# Patient Record
Sex: FEMALE | Race: WHITE | NOT HISPANIC OR LATINO | Employment: OTHER | URBAN - METROPOLITAN AREA
[De-identification: names, ages, dates, MRNs, and addresses within clinical notes are randomized per-mention and may not be internally consistent; named-entity substitution may affect disease eponyms.]

---

## 2017-01-02 ENCOUNTER — OFFICE VISIT (OUTPATIENT)
Dept: FAMILY MEDICINE | Facility: CLINIC | Age: 47
End: 2017-01-02
Payer: COMMERCIAL

## 2017-01-02 VITALS
SYSTOLIC BLOOD PRESSURE: 129 MMHG | HEIGHT: 68 IN | WEIGHT: 205.2 LBS | BODY MASS INDEX: 31.1 KG/M2 | TEMPERATURE: 97.8 F | HEART RATE: 82 BPM | DIASTOLIC BLOOD PRESSURE: 85 MMHG

## 2017-01-02 DIAGNOSIS — F33.0 MAJOR DEPRESSIVE DISORDER, RECURRENT EPISODE, MILD (H): ICD-10-CM

## 2017-01-02 DIAGNOSIS — Z01.818 PREOP GENERAL PHYSICAL EXAM: Primary | ICD-10-CM

## 2017-01-02 DIAGNOSIS — K43.9 ABDOMINAL WALL HERNIA: ICD-10-CM

## 2017-01-02 PROCEDURE — 99215 OFFICE O/P EST HI 40 MIN: CPT | Performed by: FAMILY MEDICINE

## 2017-01-02 NOTE — NURSING NOTE
"Chief Complaint   Patient presents with     Pre-Op Exam     DOS 1/4/17       Initial Pulse 82  Temp(Src) 97.8  F (36.6  C) (Tympanic)  Ht 5' 7.75\" (1.721 m)  Wt 205 lb 3.2 oz (93.078 kg)  BMI 31.43 kg/m2 Estimated body mass index is 31.43 kg/(m^2) as calculated from the following:    Height as of this encounter: 5' 7.75\" (1.721 m).    Weight as of this encounter: 205 lb 3.2 oz (93.078 kg).  BP completed using cuff size: paolo Sandoval CMA    "

## 2017-01-02 NOTE — MR AVS SNAPSHOT
After Visit Summary   1/2/2017    Consuelo Medellin    MRN: [id number]           Patient Information     Date Of Birth          1970        Visit Information        Provider Department      1/2/2017 11:00 AM Jayme An MD [facility]        Today's Diagnoses     Preop general physical exam    -  1     Major depressive disorder, recurrent episode, mild (H)           Care Instructions    Follow preoperative instructions given by your surgeon.  Your recommendations will be available to your surgeon through EPIC.    Avoid Ibuprofen or Naproxen until after surgery or further instructions.  You may take Acetaminophen 325 mg orally 1-2 tabs every 4-6 hrs as needed for pain    Before Your Surgery      Call your surgeon if there is any change in your health. This includes signs of a cold or flu (such as a sore throat, runny nose, cough, rash or fever).    Do not smoke, drink alcohol or take over the counter medicine (unless your surgeon or primary care doctor tells you to) for the 24 hours before and after surgery.    If you take prescribed drugs: Follow your doctor s orders about which medicines to take and which to stop until after surgery.    Eating and drinking prior to surgery: follow the instructions from your surgeon    Take a shower or bath the night before surgery. Use the soap your surgeon gave you to gently clean your skin. If you do not have soap from your surgeon, use your regular soap. Do not shave or scrub the surgery site.  Wear clean pajamas and have clean sheets on your bed.         Follow-ups after your visit        Your next 10 appointments already scheduled     Jan 04, 2017   Procedure with Stevie Bangura MD   [facility] (--)    [address] [address] [phone]           The medical center is located at [address] (between [address] in [place], [place] north of [place]).              Who  "to contact     If you have questions or need follow up information about today's clinic visit or your schedule please contact [facility] directly at [phone].  Normal or non-critical lab and imaging results will be communicated to you by MyChart, letter or phone within 4 business days after the clinic has received the results. If you do not hear from us within 7 days, please contact the clinic through [employer] or phone. If you have a critical or abnormal lab result, we will notify you by phone as soon as possible.  Submit refill requests through [employer] or call your pharmacy and they will forward the refill request to us. Please allow 3 business days for your refill to be completed.          Additional Information About Your Visit        [employer] Information     [employer] gives you secure access to your electronic health record. If you see a primary care provider, you can also send messages to your care team and make appointments. If you have questions, please call your primary care clinic.  If you do not have a primary care provider, please call [phone] and they will assist you.        Your Vitals Were     Pulse Temperature Height BMI (Body Mass Index)          82 97.8  F (36.6  C) (Tympanic) 5' 7.75\" (1.721 m) 31.43 kg/m2         Blood Pressure from Last 3 Encounters:   01/02/17 129/85   12/15/16 129/86   11/29/16 129/83    Weight from Last 3 Encounters:   01/02/17 205 lb 3.2 oz (93.078 kg)   12/15/16 200 lb (90.719 kg)   11/29/16 206 lb (93.441 kg)              We Performed the Following     DEPRESSION ACTION PLAN (DAP)        Primary Care Provider Office Phone # Fax #    Nano Quinonez [phone]       [facility] [address] [address]        Thank you!     Thank you for choosing [facility]  for your care. Our goal is always to provide you with excellent care. Hearing back from our patients is one way we can continue to improve " our services. Please take a few minutes to complete the written survey that you may receive in the mail after your visit with us. Thank you!             Your Updated Medication List - Protect others around you: Learn how to safely use, store and throw away your medicines at www.disposemymeds.org.          This list is accurate as of: 1/2/17 11:28 AM.  Always use your most recent med list.                   Brand Name Dispense Instructions for use    buPROPion 150 MG 24 hr tablet    WELLBUTRIN XL    90 tablet    Take 1 tablet (150 mg) by mouth every morning

## 2017-01-02 NOTE — Clinical Note
My Depression Action Plan  Name: Consuelo Medellin   Date of Birth 1970  Date: 1/2/2017    My doctor: Nano Quinonez   My clinic: [facility]  [address] [address] [phone]          GREEN    ZONE   Good Control    What it looks like:     Things are going generally well. You have normal up s and down s. You may even feel depressed from time to time, but bad moods usually last less than a day.   What you need to do:  1. Continue to care for yourself (see self care plan)  2. Check your depression survival kit and update it as needed  3. Follow your physician s recommendations including any medication.  4. Do not stop taking medication unless you consult with your physician first.           YELLOW         ZONE Getting Worse    What it looks like:     Depression is starting to interfere with your life.     It may be hard to get out of bed; you may be starting to isolate yourself from others.    Symptoms of depression are starting to last most all day and this has happened for several days.     You may have suicidal thoughts but they are not constant.   What you need to do:     1. Call your care team, your response to treatment will improve if you keep your care team informed of your progress. Yellow periods are signs an adjustment may need to be made.     2. Continue your self-care, even if you have to fake it!    3. Talk to someone in your support network    4. Open up your depression survival kit           RED    ZONE Medical Alert - Get Help    What it looks like:     Depression is seriously interfering with your life.     You may experience these or other symptoms: You can t get out of bed most days, can t work or engage in other necessary activities, you have trouble taking care of basic hygiene, or basic responsibilities, thoughts of suicide or death that will not go away, self-injurious behavior.     What you need to do:  1. Call your care team and  request a same-day appointment. If they are not available (weekends or after hours) call your local crisis line, emergency room or 911.      Electronically signed by: Jayme An, January 2, 2017    Depression Self Care Plan / Survival Kit    Self-Care for Depression  Here s the deal. Your body and mind are really not as separate as most people think.  What you do and think affects how you feel and how you feel influences what you do and think. This means if you do things that people who feel good do, it will help you feel better.  Sometimes this is all it takes.  There is also a place for medication and therapy depending on how severe your depression is, so be sure to consult with your medical provider and/ or Behavioral Health Consultant if your symptoms are worsening or not improving.     In order to better manage my stress, I will:    Exercise  Get some form of exercise, every day. This will help reduce pain and release endorphins, the  feel good  chemicals in your brain. This is almost as good as taking antidepressants!  This is not the same as joining a gym and then never going! (they count on that by the way ) It can be as simple as just going for a walk or doing some gardening, anything that will get you moving.      Hygiene   Maintain good hygiene (Get out of bed in the morning, Make your bed, Brush your teeth, Take a shower, and Get dressed like you were going to work, even if you are unemployed).  If your clothes don't fit try to get ones that do.    Diet  I will strive to eat foods that are good for me, drink plenty of water, and avoid excessive sugar, caffeine, alcohol, and other mood-altering substances.  Some foods that are helpful in depression are: complex carbohydrates, B vitamins, flaxseed, fish or fish oil, fresh fruits and vegetables.    Psychotherapy  I agree to participate in Individual Therapy (if recommended).    Medication  If prescribed medications, I agree to take them.  Missing  doses can result in serious side effects.  I understand that drinking alcohol, or other illicit drug use, may cause potential side effects.  I will not stop my medication abruptly without first discussing it with my provider.    Staying Connected With Others  I will stay in touch with my friends, family members, and my primary care provider/team.    Use your imagination  Be creative.  We all have a creative side; it doesn t matter if it s oil painting, sand castles, or mud pies! This will also kick up the endorphins.    Witness Beauty  (AKA stop and smell the roses) Take a look outside, even in mid-winter. Notice colors, textures. Watch the squirrels and birds.     Service to others  Be of service to others.  There is always someone else in need.  By helping others we can  get out of ourselves  and remember the really important things.  This also provides opportunities for practicing all the other parts of the program.    Humor  Laugh and be silly!  Adjust your TV habits for less news and crime-drama and more comedy.    Control your stress  Try breathing deep, massage therapy, biofeedback, and meditation. Find time to relax each day.     My support system    Clinic Contact:  Phone number:    Contact 1:  Phone number:    Contact 2:  Phone number:    Alevism/:  Phone number:    Therapist:  Phone number:    Local crisis center:    Phone number:    Other community support:  Phone number:

## 2017-01-02 NOTE — PROGRESS NOTES
[facility]  [address] [address]  [phone]  Dept: [phone]    PRE-OP EVALUATION:  Today's date: 2017    Consuelo Medellin (: 1970) presents for pre-operative evaluation assessment as requested by Dr. Carvalho.  She requires evaluation and anesthesia risk assessment prior to undergoing surgery/procedure for treatment of hernia .  Proposed procedure: ventral hernia repair    Date of Surgery/ Procedure: 17  Time of Surgery/ Procedure: 3:00  Hospital/Surgical Facility: [facility]  Fax number for surgical facility: [facility]  Primary Physician: Nano Quinonez  Type of Anesthesia Anticipated: General    Patient has a Health Care Directive or Living Will:  Yes - on file    1. NO - Do you have a history of heart attack, stroke, stent, bypass or surgery on an artery in the head, neck, heart or legs?  2. NO - Do you ever have any pain or discomfort in your chest?  3. NO - Do you have a history of  Heart Failure?  4. NO - Are you troubled by shortness of breath when: walking on the level, up a slight hill or at night?  5. NO - Do you currently have a cold, bronchitis or other respiratory infection?  6. NO - Do you have a cough, shortness of breath or wheezing?  7. NO - Do you sometimes get pains in the calves of your legs when you walk?  8. NO - Do you or anyone in your family have previous history of blood clots?  9. NO - Do you or does anyone in your family have a serious bleeding problem such as prolonged bleeding following surgeries or cuts?  10. NO - Have you ever had problems with anemia or been told to take iron pills?  11. NO - Have you had any abnormal blood loss such as black, tarry or bloody stools, or abnormal vaginal bleeding?  12. NO - Have you ever had a blood transfusion?  13. NO - Have you or any of your relatives ever had problems with anesthesia?  14. NO - Do you have sleep apnea, excessive snoring or daytime drowsiness?  15. NO - Do you have  any prosthetic heart valves?  16. NO - Do you have prosthetic joints?  17. NO - Is there any chance that you may be pregnant?      HPI:                                                      Brief HPI related to upcoming procedure: Patient has periumbilical ventral hernia needing repair.      See problem list for active medical problems.  Problems all longstanding and stable, except as noted/documented.  See ROS for pertinent symptoms related to these conditions.                                                                                                  .  DEPRESSION - Patient has a long history of Depression of moderate severity requiring medication for control with recent symptoms being stable..Current symptoms of depression include none.                                                                                                                                                                                    .    MEDICAL HISTORY:                                                      Patient Active Problem List    Diagnosis Date Noted     Seasonal affective disorder (H) 10/24/2016     Priority: Medium     Thyroid nodule 02/29/2016     Priority: Medium     Major depressive disorder, recurrent episode, mild (H) 11/10/2015     Priority: Medium     Obesity 11/18/2011     Priority: Medium     Attention deficit disorder of adult 11/18/2011     Priority: Medium     Inguinal hernia 10/20/2011     Priority: Medium     Problem list name updated by automated process. Provider to review       Blood type O+ 06/24/2011     Priority: Medium     CARDIOVASCULAR SCREENING; LDL GOAL LESS THAN 160 10/31/2010     Priority: Medium      Past Medical History   Diagnosis Date     Other ear problems      YOUNG CHILD     Miscarriage 7/2010     Thyroid disease      thyroid nodule     Gestational diabetes      Past Surgical History   Procedure Laterality Date     Pe tubes       Herniorrhaphy inguinal  10/26/2011      "Procedure:HERNIORRHAPHY INGUINAL; Right Inguinal Herniorrhapy with Mesh Placement; Surgeon:ANITRA SHIELDS; Location:[place] OR     Gyn surgery       C section     Thyroidectomy Left 3/8/2016     Procedure: THYROIDECTOMY;  Surgeon: Anitra Ornelas MD;  Location:  OR     Current Outpatient Prescriptions   Medication Sig Dispense Refill     buPROPion (WELLBUTRIN XL) 150 MG 24 hr tablet Take 1 tablet (150 mg) by mouth every morning 90 tablet 3     OTC products: none    Allergies   Allergen Reactions     Nkda [No Known Drug Allergies]      Wasps [Hornets] Nausea and Vomiting and Hives      Latex Allergy: NO    Social History   Substance Use Topics     Smoking status: Never Smoker      Smokeless tobacco: Never Used     Alcohol Use: Yes      Comment: rare     History   Drug Use No       REVIEW OF SYSTEMS:                                                    C: NEGATIVE for fever, chills, change in weight  I: NEGATIVE for worrisome rashes, moles or lesions  E: NEGATIVE for vision changes or irritation  E/M: NEGATIVE for ear, mouth and throat problems  R: NEGATIVE for significant cough or SOB  CV: NEGATIVE for chest pain, palpitations or peripheral edema  GI: NEGATIVE for nausea, abdominal pain, heartburn, or change in bowel habits  : NEGATIVE for frequency, dysuria, or hematuria  M: NEGATIVE for significant arthralgias or myalgia  N: NEGATIVE for weakness, dizziness or paresthesias  E: NEGATIVE for temperature intolerance, skin/hair changes  H: NEGATIVE for bleeding problems  P: NEGATIVE for changes in mood or affect    EXAM:                                                    /85 mmHg  Pulse 82  Temp(Src) 97.8  F (36.6  C) (Tympanic)  Ht 5' 7.75\" (1.721 m)  Wt 205 lb 3.2 oz (93.078 kg)  BMI 31.43 kg/m2  GENERAL APPEARANCE: obese, alert and no distress; ambulatory w/o assist  EYES: pink conj, no icterus, PERRL, EOMI  HENT: ear canals and TM's normal, nose and mouth without ulcers or lesions, " oropharynx clear and oral mucous membranes moist  NECK: no adenopathy, no asymmetry, masses, or scars and thyroid normal to palpation  RESP: lungs clear to auscultation - no rales, rhonchi or wheezes  CV: regular rates and rhythm, normal S1 S2, no S3 or S4, no murmur, click or rub, no peripheral edema and peripheral pulses strong  ABDOMEN: rounded, soft, nontender, no hepatosplenomegaly, reducible non-tender small ventral wall hernia superior to umbilicus   MS: no musculoskeletal defects are noted and gait is age appropriate without ataxia  SKIN: good turgor, no rash/jaundice/ecchymosis  NEURO: Normal strength and tone, sensory exam grossly normal, mentation intact and speech normal   DIAGNOSTICS:                                                    No labs or EKG required based on patient's medical history.    Recent Labs   Lab Test  03/02/16   1145  10/21/11   1138  09/30/10   HGB  13.1  13.1   < >  12.4   PLT  338   --    --   325    < > = values in this interval not displayed.        IMPRESSION:                                                    Reason for surgery/procedure: ventral wall hernia  Diagnosis/reason for consult: preop evaluation; depression    The proposed surgical procedure is considered INTERMEDIATE risk.    REVISED CARDIAC RISK INDEX  The patient has the following serious cardiovascular risks for perioperative complications such as (MI, PE, VFib and 3  AV Block):  No serious cardiac risks  INTERPRETATION: 0 risks: Class I (very low risk - 0.4% complication rate)    The patient has the following additional risks for perioperative complications:  No identified additional risks  [place] 10-year CHD Risk Score: <1% (8 Total Points)   Values used to calculate score:     Age: 46 years -- Points: 3     Total Cholesterol: 189 mg/dL -- Points: 3     HDL Cholesterol: 48 mg/dL -- Points: 1     Systolic BP (untreated): 129 mmHg -- Points: 1     The patient is not a smoker. -- Points: 0     The patient has  not been diagnosed with diabetes. -- Points: 0     The patient does not have a family history of CHD. -- Points:0       ICD-10-CM    1. Preop general physical exam Z01.818    2. Abdominal wall hernia K43.9    3. Major depressive disorder, recurrent episode, mild (H) F33.0 DEPRESSION ACTION PLAN (DAP)       RECOMMENDATIONS:                                                      --Consult hospital rounder / IM to assist post-op medical management  Routine DVT precautions recommended.    --Patient is to take all scheduled medications on the day of surgery EXCEPT for modifications listed below.    Patient may take Bupropion up to night before surgery.    Anticoagulant or Antiplatelet Medication Use  NSAIDS: Ibuprofen (Motrin):         Stop one day prior to surgery  Naproxen (Naprosyn):   Stop 2-3 days prior to surgery        APPROVAL GIVEN to proceed with proposed procedure, without further diagnostic evaluation       Signed Electronically by: Jayme An MD    Copy of this evaluation report is provided to requesting physician.    [facility] Preop Guidelines

## 2017-01-02 NOTE — PATIENT INSTRUCTIONS
Follow preoperative instructions given by your surgeon.  Your recommendations will be available to your surgeon through [employer].    Avoid Ibuprofen or Naproxen until after surgery or further instructions.  You may take Acetaminophen 325 mg orally 1-2 tabs every 4-6 hrs as needed for pain    Before Your Surgery      Call your surgeon if there is any change in your health. This includes signs of a cold or flu (such as a sore throat, runny nose, cough, rash or fever).    Do not smoke, drink alcohol or take over the counter medicine (unless your surgeon or primary care doctor tells you to) for the 24 hours before and after surgery.    If you take prescribed drugs: Follow your doctor s orders about which medicines to take and which to stop until after surgery.    Eating and drinking prior to surgery: follow the instructions from your surgeon    Take a shower or bath the night before surgery. Use the soap your surgeon gave you to gently clean your skin. If you do not have soap from your surgeon, use your regular soap. Do not shave or scrub the surgery site.  Wear clean pajamas and have clean sheets on your bed.

## 2017-01-03 ENCOUNTER — ANESTHESIA EVENT (OUTPATIENT)
Dept: SURGERY | Facility: CLINIC | Age: 47
End: 2017-01-03
Payer: COMMERCIAL

## 2017-01-04 ENCOUNTER — ANESTHESIA (OUTPATIENT)
Dept: SURGERY | Facility: CLINIC | Age: 47
End: 2017-01-04
Payer: COMMERCIAL

## 2017-01-04 PROCEDURE — 25000125 ZZHC RX 250: Performed by: NURSE ANESTHETIST, CERTIFIED REGISTERED

## 2017-01-04 PROCEDURE — 25000125 ZZHC RX 250: Performed by: SURGERY

## 2017-01-04 PROCEDURE — 25800025 ZZH RX 258: Performed by: NURSE ANESTHETIST, CERTIFIED REGISTERED

## 2017-01-04 RX ORDER — PROPOFOL 10 MG/ML
INJECTION, EMULSION INTRAVENOUS CONTINUOUS PRN
Status: DISCONTINUED | OUTPATIENT
Start: 2017-01-04 | End: 2017-01-04

## 2017-01-04 RX ORDER — DEXAMETHASONE SODIUM PHOSPHATE 4 MG/ML
INJECTION, SOLUTION INTRA-ARTICULAR; INTRALESIONAL; INTRAMUSCULAR; INTRAVENOUS; SOFT TISSUE PRN
Status: DISCONTINUED | OUTPATIENT
Start: 2017-01-04 | End: 2017-01-04

## 2017-01-04 RX ORDER — ONDANSETRON 2 MG/ML
INJECTION INTRAMUSCULAR; INTRAVENOUS PRN
Status: DISCONTINUED | OUTPATIENT
Start: 2017-01-04 | End: 2017-01-04

## 2017-01-04 RX ORDER — FENTANYL CITRATE 50 UG/ML
INJECTION, SOLUTION INTRAMUSCULAR; INTRAVENOUS PRN
Status: DISCONTINUED | OUTPATIENT
Start: 2017-01-04 | End: 2017-01-04

## 2017-01-04 RX ADMIN — FENTANYL CITRATE 50 MCG: 50 INJECTION, SOLUTION INTRAMUSCULAR; INTRAVENOUS at 15:55

## 2017-01-04 RX ADMIN — MIDAZOLAM HYDROCHLORIDE 1 MG: 1 INJECTION, SOLUTION INTRAMUSCULAR; INTRAVENOUS at 15:54

## 2017-01-04 RX ADMIN — SODIUM CHLORIDE, POTASSIUM CHLORIDE, SODIUM LACTATE AND CALCIUM CHLORIDE: 600; 310; 30; 20 INJECTION, SOLUTION INTRAVENOUS at 15:48

## 2017-01-04 RX ADMIN — ONDANSETRON 4 MG: 2 INJECTION INTRAMUSCULAR; INTRAVENOUS at 16:06

## 2017-01-04 RX ADMIN — MIDAZOLAM HYDROCHLORIDE 2 MG: 1 INJECTION, SOLUTION INTRAMUSCULAR; INTRAVENOUS at 15:50

## 2017-01-04 RX ADMIN — PROPOFOL 200 MCG/KG/MIN: 10 INJECTION, EMULSION INTRAVENOUS at 15:48

## 2017-01-04 RX ADMIN — MIDAZOLAM HYDROCHLORIDE 2 MG: 1 INJECTION, SOLUTION INTRAMUSCULAR; INTRAVENOUS at 15:48

## 2017-01-04 RX ADMIN — LIDOCAINE HYDROCHLORIDE 50 MG: 10 INJECTION, SOLUTION INFILTRATION; PERINEURAL at 15:48

## 2017-01-04 RX ADMIN — DEXAMETHASONE SODIUM PHOSPHATE 4 MG: 4 INJECTION, SOLUTION INTRAMUSCULAR; INTRAVENOUS at 16:06

## 2017-01-04 RX ADMIN — CEFAZOLIN SODIUM 2 G: 2 INJECTION, SOLUTION INTRAVENOUS at 15:48

## 2017-01-04 RX ADMIN — FENTANYL CITRATE 100 MCG: 50 INJECTION, SOLUTION INTRAMUSCULAR; INTRAVENOUS at 15:48

## 2017-01-04 RX ADMIN — FENTANYL CITRATE 100 MCG: 50 INJECTION, SOLUTION INTRAMUSCULAR; INTRAVENOUS at 15:50

## 2017-01-04 NOTE — ANESTHESIA CARE TRANSFER NOTE
Patient: Consuelo Lacie    HERNIORRHAPHY VENTRAL (N/A Abdomen)  Additional InformationProcedure(s):  Ventral Hernia Repair - Wound Class: I-Clean    Diagnosis: ventral hernia  Diagnosis Additional Information: No value filed.    Anesthesia Type:   MAC     Note:    Patient transferred to:Phase II        Vitals: (Last set prior to Anesthesia Care Transfer)              Electronically Signed By: Blanca Daniels CRNA, APRN CRNA  January 4, 2017  4:21 PM

## 2017-01-04 NOTE — ANESTHESIA PREPROCEDURE EVALUATION
Anesthesia Evaluation     .        ROS/MED HX    ENT/Pulmonary:       Neurologic:       Cardiovascular:     (+) ----. : . . . :. . Previous cardiac testing date:results:date: results:ECG reviewed date:9-2-12 results:NSR date: results:          METS/Exercise Tolerance:     Hematologic:         Musculoskeletal:         GI/Hepatic: Comment: Ventral hernia        Renal/Genitourinary:         Endo:     (+) thyroid problem Obesity, .      Psychiatric: Comment: ADD  SAD    (+) psychiatric history depression      Infectious Disease:         Malignancy:         Other:               Physical Exam  Normal systems: cardiovascular, pulmonary and dental    Airway   Mallampati: II  TM distance: >3 FB  Neck ROM: full    Dental     Cardiovascular       Pulmonary                     Anesthesia Plan      History & Physical Review  History and physical reviewed and following examination; no interval change.    ASA Status:  3 .    NPO Status:  > 8 hours    Plan for MAC   PONV prophylaxis:  Ondansetron (or other 5HT-3) and Dexamethasone or Solumedrol       Postoperative Care  Postoperative pain management:  IV analgesics and Oral pain medications.      Consents  Anesthetic plan, risks, benefits and alternatives discussed with:  Patient..                          .

## 2017-01-05 ENCOUNTER — TELEPHONE (OUTPATIENT)
Dept: FAMILY MEDICINE | Facility: CLINIC | Age: 47
End: 2017-01-05

## 2017-01-05 ENCOUNTER — TELEPHONE (OUTPATIENT)
Dept: SURGERY | Facility: CLINIC | Age: 47
End: 2017-01-05

## 2017-01-05 DIAGNOSIS — R11.0 NAUSEA: Primary | ICD-10-CM

## 2017-01-05 RX ORDER — ONDANSETRON 8 MG/1
8 TABLET, ORALLY DISINTEGRATING ORAL EVERY 8 HOURS PRN
Qty: 20 TABLET | Refills: 1 | Status: SHIPPED | OUTPATIENT
Start: 2017-01-05 | End: 2017-03-29

## 2017-01-05 NOTE — TELEPHONE ENCOUNTER
Patient called with the fact that Dr. Carvalho was sent the first message about nausea.  Dr. Carvalho is in surgery.  If they do not hear something in an hour to call surgery specialty to ask.  Nano Devi is not here today and I would have to send it to the pool for a new Rx for zofran and that would require a wait also.  Patient has good understanding. Jessy CARVALHO RN

## 2017-01-05 NOTE — TELEPHONE ENCOUNTER
Reason for call:  Patient reporting a symptom    Symptom or request: nauseas     Duration (how long have symptoms been present): x 1 day    Have you been treated for this before? No    Additional comments: pt stating she is very nauseas since surgery yesterday, cant keep anything down. Would like a medication to help w/ Nausea     Phone Number patient can be reached at:  Home number on file [phone] (home)    Best Time:  Any     Can we leave a detailed message on this number:  YES    Call taken on 1/5/2017 at 11:46 AM by Tri Castanon

## 2017-01-05 NOTE — TELEPHONE ENCOUNTER
Reason for Call:  Other prescription    Detailed comments: pt is calling because she had post op and nauseated and the surgery clinic has not gotten back to her yet   And she wants PCP to fill her medication request in to them or have PCP call them so they can address this faster   Pt is wanting something for nausea times one day see earlier phone not      Pt was notified that PCP is out of office     Phone Number Patient can be reached at: Home number on file [phone] (home)    Best Time: any     Can we leave a detailed message on this number? YES    Call taken on 1/5/2017 at 1:32 PM by Silvina Lopez

## 2017-01-26 ENCOUNTER — OFFICE VISIT (OUTPATIENT)
Dept: SURGERY | Facility: CLINIC | Age: 47
End: 2017-01-26
Payer: COMMERCIAL

## 2017-01-26 VITALS
HEART RATE: 71 BPM | TEMPERATURE: 97.5 F | SYSTOLIC BLOOD PRESSURE: 136 MMHG | DIASTOLIC BLOOD PRESSURE: 94 MMHG | BODY MASS INDEX: 32.02 KG/M2 | HEIGHT: 67 IN | WEIGHT: 204 LBS

## 2017-01-26 DIAGNOSIS — Z09 SURGERY FOLLOW-UP EXAMINATION: Primary | ICD-10-CM

## 2017-01-26 PROCEDURE — 99024 POSTOP FOLLOW-UP VISIT: CPT | Performed by: SURGERY

## 2017-01-26 NOTE — PROGRESS NOTES
Pt returns to see me for a post op visit.  She had a ventral hernia repaired.  She is doing well.    On examination;  The incision is healing nicely.  There is no signs of infection or recurrence.    A/P:  Well post op visit.  She is to return on a PRN basis.    Adam Bangura MD, FACS

## 2017-01-26 NOTE — NURSING NOTE
"Initial /94 mmHg  Pulse 71  Temp(Src) 97.5  F (36.4  C) (Oral)  Ht 1.702 m (5' 7\")  Wt 92.534 kg (204 lb)  BMI 31.94 kg/m2  LMP 12/26/2016 Estimated body mass index is 31.94 kg/(m^2) as calculated from the following:    Height as of this encounter: 1.702 m (5' 7\").    Weight as of this encounter: 92.534 kg (204 lb). .    Carmela Grigsby MA    "

## 2017-02-26 ENCOUNTER — APPOINTMENT (OUTPATIENT)
Dept: GENERAL RADIOLOGY | Facility: CLINIC | Age: 47
End: 2017-02-26
Attending: EMERGENCY MEDICINE
Payer: COMMERCIAL

## 2017-02-26 ENCOUNTER — HOSPITAL ENCOUNTER (EMERGENCY)
Facility: CLINIC | Age: 47
Discharge: HOME | End: 2017-02-26
Attending: EMERGENCY MEDICINE | Admitting: EMERGENCY MEDICINE
Payer: COMMERCIAL

## 2017-02-26 VITALS
SYSTOLIC BLOOD PRESSURE: 126 MMHG | RESPIRATION RATE: 18 BRPM | DIASTOLIC BLOOD PRESSURE: 89 MMHG | OXYGEN SATURATION: 95 % | HEART RATE: 81 BPM | BODY MASS INDEX: 31.32 KG/M2 | WEIGHT: 200 LBS | TEMPERATURE: 98.4 F

## 2017-02-26 DIAGNOSIS — R07.89 ATYPICAL CHEST PAIN: ICD-10-CM

## 2017-02-26 LAB
ALBUMIN SERPL-MCNC: 3.7 G/DL (ref 3.4–5)
ALBUMIN UR-MCNC: NEGATIVE MG/DL
ALP SERPL-CCNC: 81 U/L (ref 40–150)
ALT SERPL W P-5'-P-CCNC: 21 U/L (ref 0–50)
ANION GAP SERPL CALCULATED.3IONS-SCNC: 7 MMOL/L (ref 3–14)
APPEARANCE UR: CLEAR
AST SERPL W P-5'-P-CCNC: 11 U/L (ref 0–45)
BASOPHILS # BLD AUTO: 0 10E9/L (ref 0–0.2)
BASOPHILS NFR BLD AUTO: 0.2 %
BILIRUB SERPL-MCNC: 0.2 MG/DL (ref 0.2–1.3)
BILIRUB UR QL STRIP: NEGATIVE
BUN SERPL-MCNC: 13 MG/DL (ref 7–30)
CALCIUM SERPL-MCNC: 8.1 MG/DL (ref 8.5–10.1)
CHLORIDE SERPL-SCNC: 105 MMOL/L (ref 94–109)
CO2 SERPL-SCNC: 30 MMOL/L (ref 20–32)
COLOR UR AUTO: ABNORMAL
CREAT SERPL-MCNC: 0.83 MG/DL (ref 0.52–1.04)
DIFFERENTIAL METHOD BLD: NORMAL
EOSINOPHIL # BLD AUTO: 0.1 10E9/L (ref 0–0.7)
EOSINOPHIL NFR BLD AUTO: 0.9 %
ERYTHROCYTE [DISTWIDTH] IN BLOOD BY AUTOMATED COUNT: 12.7 % (ref 10–15)
GFR SERPL CREATININE-BSD FRML MDRD: 74 ML/MIN/1.7M2
GLUCOSE SERPL-MCNC: 105 MG/DL (ref 70–99)
GLUCOSE UR STRIP-MCNC: NEGATIVE MG/DL
HCG SERPL QL: NEGATIVE
HCT VFR BLD AUTO: 38.4 % (ref 35–47)
HGB BLD-MCNC: 12.2 G/DL (ref 11.7–15.7)
HGB UR QL STRIP: ABNORMAL
IMM GRANULOCYTES # BLD: 0 10E9/L (ref 0–0.4)
IMM GRANULOCYTES NFR BLD: 0.2 %
KETONES UR STRIP-MCNC: NEGATIVE MG/DL
LEUKOCYTE ESTERASE UR QL STRIP: NEGATIVE
LIPASE SERPL-CCNC: 131 U/L (ref 73–393)
LYMPHOCYTES # BLD AUTO: 2.2 10E9/L (ref 0.8–5.3)
LYMPHOCYTES NFR BLD AUTO: 21 %
MCH RBC QN AUTO: 29 PG (ref 26.5–33)
MCHC RBC AUTO-ENTMCNC: 31.8 G/DL (ref 31.5–36.5)
MCV RBC AUTO: 91 FL (ref 78–100)
MONOCYTES # BLD AUTO: 0.6 10E9/L (ref 0–1.3)
MONOCYTES NFR BLD AUTO: 6.1 %
NEUTROPHILS # BLD AUTO: 7.5 10E9/L (ref 1.6–8.3)
NEUTROPHILS NFR BLD AUTO: 71.6 %
NITRATE UR QL: NEGATIVE
PH UR STRIP: 6.5 PH (ref 5–7)
PLATELET # BLD AUTO: 304 10E9/L (ref 150–450)
POTASSIUM SERPL-SCNC: 4.2 MMOL/L (ref 3.4–5.3)
PROT SERPL-MCNC: 7.5 G/DL (ref 6.8–8.8)
RBC # BLD AUTO: 4.2 10E12/L (ref 3.8–5.2)
RBC #/AREA URNS AUTO: 6 /HPF (ref 0–2)
SODIUM SERPL-SCNC: 142 MMOL/L (ref 133–144)
SP GR UR STRIP: 1 (ref 1–1.03)
TROPONIN I SERPL-MCNC: NORMAL UG/L (ref 0–0.04)
TROPONIN I SERPL-MCNC: NORMAL UG/L (ref 0–0.04)
URN SPEC COLLECT METH UR: ABNORMAL
UROBILINOGEN UR STRIP-MCNC: NORMAL MG/DL (ref 0–2)
WBC # BLD AUTO: 10.4 10E9/L (ref 4–11)
WBC #/AREA URNS AUTO: <1 /HPF (ref 0–2)

## 2017-02-26 PROCEDURE — 80053 COMPREHEN METABOLIC PANEL: CPT | Performed by: EMERGENCY MEDICINE

## 2017-02-26 PROCEDURE — 83690 ASSAY OF LIPASE: CPT | Performed by: EMERGENCY MEDICINE

## 2017-02-26 PROCEDURE — 81001 URINALYSIS AUTO W/SCOPE: CPT | Performed by: EMERGENCY MEDICINE

## 2017-02-26 PROCEDURE — 84484 ASSAY OF TROPONIN QUANT: CPT | Performed by: EMERGENCY MEDICINE

## 2017-02-26 PROCEDURE — 99285 EMERGENCY DEPT VISIT HI MDM: CPT | Mod: 25

## 2017-02-26 PROCEDURE — 25000128 H RX IP 250 OP 636: Performed by: EMERGENCY MEDICINE

## 2017-02-26 PROCEDURE — 99285 EMERGENCY DEPT VISIT HI MDM: CPT | Mod: 25 | Performed by: EMERGENCY MEDICINE

## 2017-02-26 PROCEDURE — 84703 CHORIONIC GONADOTROPIN ASSAY: CPT | Performed by: EMERGENCY MEDICINE

## 2017-02-26 PROCEDURE — 96374 THER/PROPH/DIAG INJ IV PUSH: CPT

## 2017-02-26 PROCEDURE — 71020 XR CHEST 2 VW: CPT

## 2017-02-26 PROCEDURE — 93005 ELECTROCARDIOGRAM TRACING: CPT

## 2017-02-26 PROCEDURE — 25000132 ZZH RX MED GY IP 250 OP 250 PS 637: Performed by: EMERGENCY MEDICINE

## 2017-02-26 PROCEDURE — 93010 ELECTROCARDIOGRAM REPORT: CPT | Performed by: EMERGENCY MEDICINE

## 2017-02-26 PROCEDURE — 85025 COMPLETE CBC W/AUTO DIFF WBC: CPT | Performed by: EMERGENCY MEDICINE

## 2017-02-26 RX ORDER — KETOROLAC TROMETHAMINE 30 MG/ML
15 INJECTION, SOLUTION INTRAMUSCULAR; INTRAVENOUS ONCE
Status: COMPLETED | OUTPATIENT
Start: 2017-02-26 | End: 2017-02-26

## 2017-02-26 RX ORDER — ASPIRIN 81 MG/1
324 TABLET, CHEWABLE ORAL ONCE
Status: COMPLETED | OUTPATIENT
Start: 2017-02-26 | End: 2017-02-26

## 2017-02-26 RX ADMIN — ASPIRIN 81 MG 324 MG: 81 TABLET ORAL at 21:00

## 2017-02-26 RX ADMIN — KETOROLAC TROMETHAMINE 15 MG: 30 INJECTION, SOLUTION INTRAMUSCULAR at 22:19

## 2017-02-26 ASSESSMENT — ENCOUNTER SYMPTOMS
FEVER: 0
CHEST TIGHTNESS: 1
SHORTNESS OF BREATH: 0
ABDOMINAL PAIN: 0
COUGH: 0

## 2017-02-26 NOTE — ED AVS SNAPSHOT
[facility] Emergency Department    [address] [facility] [address]    Phone:  [phone]    Fax:  [phone]                                       Consuelo Medellin   MRN: [id number]    Department:  [facility] Emergency Department   Date of Visit:  2/26/2017           Patient Information     Date Of Birth          1970        Your diagnoses for this visit were:     Atypical chest pain        You were seen by Angel Chester MD.      Follow-up Information     Follow up with Nano Quinonez NP.    Specialty:  Nurse Practitioner - Family    Why:  As needed    Contact information:    [facility]  [address] [place] [address]  [phone]          Follow up with [facility] Emergency Department.    Specialty:  EMERGENCY MEDICINE    Why:  If symptoms worsen    Contact information:    [address] [place] [id number] [phone]    Additional information:    The medical center is located at   [address] [facility] ([address]   [facility] [address] in [place], [place] miles north   of [place]).        Discharge Instructions        return if symptoms worsen or new symptoms develop.  Follow-up with primary care physician this week for recheck and possible set up a stress test.  Drink plenty of fluids.  If return of chest pain please return for further evaluation and care.  *CHEST PAIN, UNCERTAIN CAUSE    Based on your exam today, the exact cause of your chest pain is not certain. Your condition does not seem serious at this time, and your pain does not appear to be coming from your heart. However, sometimes the signs of a serious problem take more time to appear. Therefore, watch for the warning signs listed below.  HOME CARE:  1. Rest today and avoid strenuous activity.  2. Take any prescribed medicine as directed.  FOLLOW UP with your doctor in 1-3 days.   GET PROMPT MEDICAL ATTENTION if any of the following occur:    A change in the type of pain: if  it feels different, becomes more severe, lasts longer, or begins to spread into your shoulder, arm, neck, jaw or back    Shortness of breath or increased pain with breathing    Weakness, dizziness, or fainting    Cough with blood or dark colored sputum (phlegm)    Fever over 101  F (38.3  C)    Swelling, pain or redness in one leg    [id number] Kristina Vaughan, [address] [place] [address], [place] [address]. All rights reserved. This information is not intended as a substitute for professional medical care. Always follow your healthcare professional's instructions.      24 Hour Appointment Hotline       To make an appointment at any [facility], call [phone] ([phone]). If you don't have a family doctor or clinic, we will help you find one. [facility] are conveniently located to serve the needs of you and your family.             Review of your medicines      Our records show that you are taking the medicines listed below. If these are incorrect, please call your family doctor or clinic.        Dose / Directions Last dose taken    buPROPion 150 MG 24 hr tablet   Commonly known as:  WELLBUTRIN XL   Dose:  150 mg   Quantity:  90 tablet        Take 1 tablet (150 mg) by mouth every morning   Refills:  3        HYDROcodone-acetaminophen 5-325 MG per tablet   Commonly known as:  NORCO   Dose:  1-2 tablet   Quantity:  30 tablet        Take 1-2 tablets by mouth every 4 hours as needed for other (Moderate to Severe Pain)   Refills:  0        ondansetron 8 MG ODT tab   Commonly known as:  ZOFRAN ODT   Dose:  8 mg   Quantity:  20 tablet        Take 1 tablet (8 mg) by mouth every 8 hours as needed for nausea   Refills:  1                Procedures and tests performed during your visit     Procedure/Test Number of Times Performed    CBC with platelets, differential 1    Chest XR,  PA & LAT 1    Comprehensive metabolic panel 1    EKG 12-lead, tracing only 1    HCG qualitative pregnancy (blood) 1    Lipase 1     Troponin I 2    UA reflex to Microscopic 1      Orders Needing Specimen Collection     None      Pending Results     No orders found from 2/24/2017 to 2/27/2017.            Pending Culture Results     No orders found from 2/24/2017 to 2/27/2017.             Test Results from your hospital stay     2/26/2017  9:21 PM - Interface, Flexilab Results      Component Results     Component Value Ref Range & Units Status    WBC 10.4 4.0 - 11.0 10e9/L Final    RBC Count 4.20 3.8 - 5.2 10e12/L Final    Hemoglobin 12.2 11.7 - 15.7 g/dL Final    Hematocrit 38.4 35.0 - 47.0 % Final    MCV 91 78 - 100 fl Final    MCH 29.0 26.5 - 33.0 pg Final    MCHC 31.8 31.5 - 36.5 g/dL Final    RDW 12.7 10.0 - 15.0 % Final    Platelet Count 304 150 - 450 10e9/L Final    Diff Method Automated Method  Final    % Neutrophils 71.6 % Final    % Lymphocytes 21.0 % Final    % Monocytes 6.1 % Final    % Eosinophils 0.9 % Final    % Basophils 0.2 % Final    % Immature Granulocytes 0.2 % Final    Absolute Neutrophil 7.5 1.6 - 8.3 10e9/L Final    Absolute Lymphocytes 2.2 0.8 - 5.3 10e9/L Final    Absolute Monocytes 0.6 0.0 - 1.3 10e9/L Final    Absolute Eosinophils 0.1 0.0 - 0.7 10e9/L Final    Absolute Basophils 0.0 0.0 - 0.2 10e9/L Final    Abs Immature Granulocytes 0.0 0 - 0.4 10e9/L Final         2/26/2017  9:37 PM - Interface, Flexilab Results      Component Results     Component Value Ref Range & Units Status    Sodium 142 133 - 144 mmol/L Final    Potassium 4.2 3.4 - 5.3 mmol/L Final    Chloride 105 94 - 109 mmol/L Final    Carbon Dioxide 30 20 - 32 mmol/L Final    Anion Gap 7 3 - 14 mmol/L Final    Glucose 105 (H) 70 - 99 mg/dL Final    Urea Nitrogen 13 7 - 30 mg/dL Final    Creatinine 0.83 0.52 - 1.04 mg/dL Final    GFR Estimate 74 >60 mL/min/1.7m2 Final    Non  GFR Calc    GFR Estimate If Black 90 >60 mL/min/1.7m2 Final    African American GFR Calc    Calcium 8.1 (L) 8.5 - 10.1 mg/dL Final    Bilirubin Total 0.2 0.2 - 1.3 mg/dL  Final    Albumin 3.7 3.4 - 5.0 g/dL Final    Protein Total 7.5 6.8 - 8.8 g/dL Final    Alkaline Phosphatase 81 40 - 150 U/L Final    ALT 21 0 - 50 U/L Final    AST 11 0 - 45 U/L Final         2/26/2017  9:37 PM - Interface, Flexilab Results      Component Results     Component Value Ref Range & Units Status    Troponin I ES  0.000 - 0.045 ug/L Final    <0.015  The 99th percentile for upper reference range is 0.045 ug/L.  Troponin values in   the range of 0.045 - 0.120 ug/L may be associated with risks of adverse   clinical events.           2/26/2017  9:37 PM - Interface, Flexilab Results      Component Results     Component Value Ref Range & Units Status    Lipase 131 73 - 393 U/L Final         2/26/2017  9:44 PM - Interface, Radiant Ib      Narrative     XR CHEST 2 VW   2/26/2017 9:39 PM     HISTORY: Shortness of breath.    COMPARISON: None.        Impression     IMPRESSION: Normal.    ALFREDO SONG MD         2/26/2017  9:28 PM - Interface, Flexilab Results      Component Results     Component Value Ref Range & Units Status    HCG Qualitative Serum Negative NEG Final         2/26/2017  9:42 PM - Interface, Flexilab Results      Component Results     Component Value Ref Range & Units Status    Color Urine Straw  Final    Appearance Urine Clear  Final    Glucose Urine Negative NEG mg/dL Final    Bilirubin Urine Negative NEG Final    Ketones Urine Negative NEG mg/dL Final    Specific Gravity Urine 1.002 (L) 1.003 - 1.035 Final    Blood Urine Moderate (A) NEG Final    pH Urine 6.5 5.0 - 7.0 pH Final    Protein Albumin Urine Negative NEG mg/dL Final    Urobilinogen mg/dL Normal 0.0 - 2.0 mg/dL Final    Nitrite Urine Negative NEG Final    Leukocyte Esterase Urine Negative NEG Final    Source Midstream Urine  Final    RBC Urine 6 (H) 0 - 2 /HPF Final    WBC Urine <1 0 - 2 /HPF Final         2/26/2017 11:20 PM - Interface, Flexilab Results      Component Results     Component Value Ref Range & Units Status    Troponin I  ES  0.000 - 0.045 ug/L Final    <0.015  The 99th percentile for upper reference range is 0.045 ug/L.  Troponin values in   the range of 0.045 - 0.120 ug/L may be associated with risks of adverse   clinical events.                  Thank you for choosing [facility]       Thank you for choosing [facility] for your care. Our goal is always to provide you with excellent care. Hearing back from our patients is one way we can continue to improve our services. Please take a few minutes to complete the written survey that you may receive in the mail after you visit with us. Thank you!        [facility] Information     [employer] gives you secure access to your electronic health record. If you see a primary care provider, you can also send messages to your care team and make appointments. If you have questions, please call your primary care clinic.  If you do not have a primary care provider, please call [phone] and they will assist you.        Care EveryWhere ID     This is your Care EveryWhere ID. This could be used by other organizations to access your [facility] medical records  [id number]        After Visit Summary       This is your record. Keep this with you and show to your community pharmacist(s) and doctor(s) at your next visit.

## 2017-02-26 NOTE — ED AVS SNAPSHOT
[facility] Emergency Department    [address] [facility] [address]    Phone:  [phone]    Fax:  [phone]                                       Consuelo Medellin   MRN: [id number]    Department:  [facility] Emergency Department   Date of Visit:  2/26/2017           After Visit Summary Signature Page     I have received my discharge instructions, and my questions have been answered. I have discussed any challenges I see with this plan with the nurse or doctor.    ..........................................................................................................................................  Patient/Patient Representative Signature      ..........................................................................................................................................  Patient Representative Print Name and Relationship to Patient    ..................................................               ................................................  Date                                            Time    ..........................................................................................................................................  Reviewed by Signature/Title    ...................................................              ..............................................  Date                                                            Time

## 2017-02-27 ENCOUNTER — TELEPHONE (OUTPATIENT)
Dept: FAMILY MEDICINE | Facility: CLINIC | Age: 47
End: 2017-02-27

## 2017-02-27 NOTE — TELEPHONE ENCOUNTER
Reason for Call: Request for an order or referral:    Order or referral being requested: order for stress test    Date needed: as soon as possible    Has the patient been seen by the PCP for this problem? No    Additional comments: pt calling stating she was seen in the ER for cardiac issues and is ok, but Dr Chester wanted her to get a stress test. When she called in to schedule there wasn't an order in. She was wondering if you could put in an order for her? Pt would like us to my chart her when the order is in there so she can schedule her test.    Notes from ER:  return if symptoms worsen or new symptoms develop. Follow-up with primary care physician this week for recheck and possible set up a stress test. Drink plenty of fluids. If return of chest pain please return for further evaluation and care.      Phone number Patient can be reached at:  Home number on file [phone] (home)    Best Time:  any    Can we leave a detailed message on this number?  YES    Call taken on 2/27/2017 at 1:51 PM by Jayne Shepard

## 2017-02-27 NOTE — ED NOTES
"Pt states about 45 min before arriving pt. She had \"a pressure in back of rt leg that radiated up rt side of body and into chest.  It's like a marble rolling up my body.\"  No cp or soa.  No n/v/d. No recent illness.  In ER pt states the pressure/tightness in chest comes and goes.  "

## 2017-02-27 NOTE — ED PROVIDER NOTES
History     Chief Complaint   Patient presents with     Chest Pain     pain tightening from right calf through hip over shoulder and has now settled into left chest. Extremely fatigued suddenly, and cold hands. Denies cardiac hx, No ASA today.      HPI  Consuelo Medellin is a 46 year old female who has a history of depression, thyroid nodule, inguinal hernia, and blood type O+ who presents to the ED today for evaluation of chest pain. Patient states that she was standing up looking through a cook book when she got a tightness in her right calf. This area of tightness stayed as a specific region and size and traveled up the right side of her body into the her chest. Patient states that when the tightness got to her chest it would go up and down the midline of her chest and neck until it stopped in the center of her chest and would wax and wane. Patient states that she has not been able to get it to go away, and the pain is not aggravated by any palpation or position. She has had issues with her circulation in the past that has gotten a little worse recently to the point where she has to sleep with her upper body elevated. She has no family history of heart disease. Patient is not a smoker and has no history of hypertension or hyperlipidemia. Patient denies recent illness, cough, fevers, abdominal pain, shortness of breath, leg swelling, or recent fall. Currently rates her pain a 2/10.    Patient Active Problem List   Diagnosis     CARDIOVASCULAR SCREENING; LDL GOAL LESS THAN 160     Blood type O+     Inguinal hernia     Obesity     Attention deficit disorder of adult     Major depressive disorder, recurrent episode, mild (H)     Thyroid nodule     Seasonal affective disorder (H)     Current Outpatient Prescriptions   Medication Sig Dispense Refill     ondansetron (ZOFRAN ODT) 8 MG ODT tab Take 1 tablet (8 mg) by mouth every 8 hours as needed for nausea 20 tablet 1     HYDROcodone-acetaminophen (NORCO) 5-325 MG per tablet  Take 1-2 tablets by mouth every 4 hours as needed for other (Moderate to Severe Pain) 30 tablet 0     buPROPion (WELLBUTRIN XL) 150 MG 24 hr tablet Take 1 tablet (150 mg) by mouth every morning 90 tablet 3     Allergies   Allergen Reactions     Nkda [No Known Drug Allergies]      Wasps [Hornets] Nausea and Vomiting and Hives       I have reviewed the Medications, Allergies, Past Medical and Surgical History, and Social History in the Epic system.    Review of Systems   Constitutional: Negative for activity change, appetite change, chills and fever.   HENT: Negative for congestion.    Respiratory: Positive for chest tightness. Negative for cough and shortness of breath.    Cardiovascular: Negative for chest pain and leg swelling.   Gastrointestinal: Negative for abdominal pain, nausea and vomiting.   Genitourinary: Negative for decreased urine volume and dysuria.   Musculoskeletal: Negative for back pain and neck pain.   Skin: Negative for rash.   Neurological: Negative for dizziness, weakness and light-headedness.   Hematological: Does not bruise/bleed easily.       Physical Exam   BP: (!) 162/108  Pulse: 89  Temp: 98.4  F (36.9  C)  Resp: 16  Weight: 90.7 kg (200 lb)  SpO2: 99 %  Physical Exam   Constitutional: She appears well-developed and well-nourished. No distress.   Psychiatric: She has a normal mood and affect.   Nursing note and vitals reviewed.    HENT: Oral mucosa moist. No lesions.  Neck: Supple  Pulmonary/Chest: Lungs are clear to auscultation bilaterally.  Cardiovascular: Heart is regular rate and rhythm. No murmur. No anterior chest wall tenderness.   Abdomen: Soft, non-distended, non-tender.   Musculoskeletal: Moving all extremities well. No peripheral edema. No calf tenderness , erythema or swelling. Pulses and sensation are symmetrical.   Neurological: Alert. No focal neurologic deficit.   Skin: No rash.    ED Course     ED Course     Procedures             EKG Interpretation:      Interpreted by  Angel Chester  Rhythm: normal sinus   Rate: Normal  Axis: Normal   Ectopy: none  Conduction: right bundle branch block (incomplete)slight  ST Segments/ T Waves: No ST-T wave changes  Q Waves: none  Comparison to prior: slight incomplete RBBB compared to 9/2/12    Clinical Impression: no acute changes ; possible slight RBBB                Critical Care time:  none               Labs Ordered and Resulted from Time of ED Arrival Up to the Time of Departure from the ED   COMPREHENSIVE METABOLIC PANEL - Abnormal; Notable for the following:        Result Value    Glucose 105 (*)     Calcium 8.1 (*)     All other components within normal limits   URINE MACROSCOPIC WITH REFLEX TO MICRO - Abnormal; Notable for the following:     Specific Gravity Urine 1.002 (*)     Blood Urine Moderate (*)     RBC Urine 6 (*)     All other components within normal limits   CBC WITH PLATELETS DIFFERENTIAL   TROPONIN I   LIPASE   HCG QUALITATIVE   TROPONIN I     Results for orders placed or performed during the hospital encounter of 02/26/17   Chest XR,  PA & LAT    Narrative    XR CHEST 2 VW   2/26/2017 9:39 PM     HISTORY: Shortness of breath.    COMPARISON: None.      Impression    IMPRESSION: Normal.    ALFREDO SONG MD         Medications   aspirin chewable tablet 324 mg (324 mg Oral Given 2/26/17 2100)   ketorolac (TORADOL) injection 15 mg (15 mg Intravenous Given 2/26/17 2219)       8:21 PM Patient assessed.   Assessments & Plan (with Medical Decision Making)Labs, ekg, and cxr were obtained. Patient was given ASA, and toradol for pain. Patient's ekg without significant change. White count is not elevated. Hgb within normal limits. No L shift is present. Comprehensive metabolic panel is unremarkable . Small blood is noted on the UA. Troponin is within normal limits. Cxr is unremarkable. Patients pain resolved with medications. Patient was observed for several hours and had a repeat troponin which was unremarkable. Patient without  significant risk factors. At this time I do not think her pain is cardiac in nature. Findings discussed in detail with the patient. She remains pain free. She will return if symptoms worsen or new symptoms  Develop. She will follow up with primary care this week for recheck and possible set up of a stress test. She is in agreement with this plan. I have considered numerous causes of chest pain including; ACS, MI, pneumonia, pneumothorax, dissection, pleurisy, pericarditis, myocarditis, Gerd, musculoskeletal, PUD. Etc..     I have reviewed the nursing notes.    I have reviewed the findings, diagnosis, plan and need for follow up with the patient.    New Prescriptions    No medications on file       Final diagnoses:   Atypical chest pain   This document serves as a record of the services and decisions personally performed and made by Angel Chester MD. It was created on HIS/HER behalf by   Ivania Mcnair, a trained medical scribe. The creation of this document is based the provider's statements to the medical scribe.  Ivania Mcnair 8:20 PM 2/26/2017    Provider:   The information in this document, created by the medical scribe for me, accurately reflects the services I personally performed and the decisions made by me. I have reviewed and approved this document for accuracy prior to leaving the patient care area.  Angel Chester MD 8:20 PM 2/26/2017 2/26/2017   [facility] EMERGENCY DEPARTMENT     Angel Chester MD  02/28/17 4844

## 2017-02-27 NOTE — DISCHARGE INSTRUCTIONS
return if symptoms worsen or new symptoms develop.  Follow-up with primary care physician this week for recheck and possible set up a stress test.  Drink plenty of fluids.  If return of chest pain please return for further evaluation and care.  *CHEST PAIN, UNCERTAIN CAUSE    Based on your exam today, the exact cause of your chest pain is not certain. Your condition does not seem serious at this time, and your pain does not appear to be coming from your heart. However, sometimes the signs of a serious problem take more time to appear. Therefore, watch for the warning signs listed below.  HOME CARE:  1. Rest today and avoid strenuous activity.  2. Take any prescribed medicine as directed.  FOLLOW UP with your doctor in 1-3 days.   GET PROMPT MEDICAL ATTENTION if any of the following occur:    A change in the type of pain: if it feels different, becomes more severe, lasts longer, or begins to spread into your shoulder, arm, neck, jaw or back    Shortness of breath or increased pain with breathing    Weakness, dizziness, or fainting    Cough with blood or dark colored sputum (phlegm)    Fever over 101  F (38.3  C)    Swelling, pain or redness in one leg    [id number] [address], [place] [address]. All rights reserved. This information is not intended as a substitute for professional medical care. Always follow your healthcare professional's instructions.

## 2017-02-27 NOTE — ED NOTES
pain tightening from right calf through hip over shoulder and has now settled into left chest. Extremely fatigued suddenly, and cold hands. Denies cardiac hx, No ASA today.

## 2017-02-28 ASSESSMENT — ENCOUNTER SYMPTOMS
WEAKNESS: 0
LIGHT-HEADEDNESS: 0
NAUSEA: 0
CHILLS: 0
BRUISES/BLEEDS EASILY: 0
NECK PAIN: 0
ACTIVITY CHANGE: 0
VOMITING: 0
DYSURIA: 0
DIZZINESS: 0
BACK PAIN: 0
APPETITE CHANGE: 0

## 2017-03-29 ENCOUNTER — ALLIED HEALTH/NURSE VISIT (OUTPATIENT)
Dept: FAMILY MEDICINE | Facility: CLINIC | Age: 47
End: 2017-03-29
Payer: COMMERCIAL

## 2017-03-29 ENCOUNTER — OFFICE VISIT (OUTPATIENT)
Dept: FAMILY MEDICINE | Facility: CLINIC | Age: 47
End: 2017-03-29
Payer: COMMERCIAL

## 2017-03-29 VITALS
SYSTOLIC BLOOD PRESSURE: 126 MMHG | WEIGHT: 209.4 LBS | OXYGEN SATURATION: 99 % | BODY MASS INDEX: 32.87 KG/M2 | DIASTOLIC BLOOD PRESSURE: 87 MMHG | HEART RATE: 103 BPM | HEIGHT: 67 IN | TEMPERATURE: 100.3 F

## 2017-03-29 DIAGNOSIS — R68.89 FLU-LIKE SYMPTOMS: Primary | ICD-10-CM

## 2017-03-29 DIAGNOSIS — R05.9 COUGH: ICD-10-CM

## 2017-03-29 DIAGNOSIS — J10.1 INFLUENZA A: ICD-10-CM

## 2017-03-29 LAB
FLUAV+FLUBV AG SPEC QL: NEGATIVE
FLUAV+FLUBV AG SPEC QL: POSITIVE
SPECIMEN SOURCE: ABNORMAL

## 2017-03-29 PROCEDURE — 99207 ZZC NO CHARGE NURSE ONLY: CPT

## 2017-03-29 PROCEDURE — 99213 OFFICE O/P EST LOW 20 MIN: CPT | Performed by: NURSE PRACTITIONER

## 2017-03-29 PROCEDURE — 87804 INFLUENZA ASSAY W/OPTIC: CPT | Performed by: NURSE PRACTITIONER

## 2017-03-29 RX ORDER — OSELTAMIVIR PHOSPHATE 75 MG/1
75 CAPSULE ORAL 2 TIMES DAILY
Qty: 10 CAPSULE | Refills: 0 | Status: SHIPPED | OUTPATIENT
Start: 2017-03-29 | End: 2017-04-03

## 2017-03-29 NOTE — PROGRESS NOTES
"  SUBJECTIVE:                                                    Consuelo Medellin is a 47 year old female who presents to clinic today for the following health issues:  The patient presents complaining of productive cough, shortness of breath with activity low-grade fevers, sinus congestion and feeling wheezing. The patient states that her  was just recently diagnosed with influenza. His symptoms started on Saturday night, 4 days ago.     ENT Symptoms             Symptoms: cc Present Absent Comment   Fever/Chills  x  Shivering and Sweaty    Fatigue  x     Muscle Aches  x     Eye Irritation   x    Sneezing  x     Nasal Kash/Drg  x     Sinus Pressure/Pain  x     Loss of smell   x    Dental pain   x    Sore Throat  x  From coughing    Swollen Glands   x    Ear Pain/Fullness   x    Cough  x     Wheeze  x     Chest Pain  x     Shortness of breath  x     Rash   x    Other         Symptom duration:  Saturday    Symptom severity:    Treatments tried: Tylenol and Welbutrin    Contacts:  Daughter has bronchitis,  has Flu     Problem list and histories reviewed & adjusted, as indicated.  Additional history: as documented    Labs reviewed in EPIC    Reviewed and updated as needed this visit by clinical staff  Tobacco  Allergies  Med Hx  Surg Hx  Fam Hx  Soc Hx      Reviewed and updated as needed this visit by Provider         ROS:  Constitutional, HEENT, cardiovascular, pulmonary, gi and gu systems are negative, except as otherwise noted.    OBJECTIVE:                                                    /87  Pulse 103  Temp 100.3  F (37.9  C) (Tympanic)  Ht 5' 7.25\" (1.708 m)  Wt 209 lb 6.4 oz (95 kg)  SpO2 99%  BMI 32.55 kg/m2  Body mass index is 32.55 kg/(m^2).  GENERAL: healthy, alert and no distress  HENT: ear canals and TM's normal, nose and mouth without ulcers or lesions  NECK: anterior cervical adenopathy, and no asymmetry, masses, or scars  RESP: lungs clear to auscultation - no rales, rhonchi " or wheezes  CV: regular rate and rhythm, normal S1 S2, no S3 or S4, no murmur, click or rub, no peripheral edema and peripheral pulses strong    Diagnostic Test Results:  Influenza test is positive.     ASSESSMENT/PLAN:                                                      1. Flu-like symptoms  - Influenza A/B antigen-positive for type A.    2. Cough  -symptomatic therapy suggested, on Mucinex extended release 600 mg twice daily as needed for cough.    3. Influenza A  Her symptoms started more than 72 hours ago so I explained to the patient since her symptoms ongoing for over 3 days antiviral therapy would not be so effective compare if she would start it within 48 hours after onset of symptoms, but she still prefers treatment.   - oseltamivir (TAMIFLU) 75 MG capsule; Take 1 capsule (75 mg) by mouth 2 times daily  Dispense: 10 capsule; Refill: 0  -symptomatic therapy discussed  -drink more fluids, rest     See Patient Instructions    MICHAEL Rutledge [facility]

## 2017-03-29 NOTE — MR AVS SNAPSHOT
After Visit Summary   3/29/2017    Consuelo Medellin    MRN: [id number]           Patient Information     Date Of Birth          1970        Visit Information        Provider Department      3/29/2017 9:00 AM Tiff Penn APRN CNP [facility]        Today's Diagnoses     Flu-like symptoms    -  1    Cough        Influenza A          Care Instructions    Lungs are clear  For sinus congestion: nasal saline irrigations 4-5 times daily  May use Afrin spray twice daily for nasal congestion for up to 3 days  Drink enough fluids, vitamin V, honey, lemon.  Tylenol 500 mg 4 times daily as needed for fevers over 101 F and headaches, body aches.    For cough: Mucinex  mg twice daily with glass of water, or Guaifenesin , or Robitussin (available over the counter).    Influenza test:  Positive for type A    Start Tamiflu 75 mg twice daily for 5 days (this is antiviral medication, will shorten the duration of your symptoms), it would be more effective if you would start it earlier.     Follow up in 4-5 days if not improving         Follow-ups after your visit        Who to contact     If you have questions or need follow up information about today's clinic visit or your schedule please contact [facility] directly at [phone].  Normal or non-critical lab and imaging results will be communicated to you by yoonehart, letter or phone within 4 business days after the clinic has received the results. If you do not hear from us within 7 days, please contact the clinic through yoonehart or phone. If you have a critical or abnormal lab result, we will notify you by phone as soon as possible.  Submit refill requests through [employer] or call your pharmacy and they will forward the refill request to us. Please allow 3 business days for your refill to be completed.          Additional Information About Your Visit        yooneharPartnerpedia Information     [employer] gives you secure access to your  "electronic health record. If you see a primary care provider, you can also send messages to your care team and make appointments. If you have questions, please call your primary care clinic.  If you do not have a primary care provider, please call [phone] and they will assist you.        Care EveryWhere ID     This is your Care EveryWhere ID. This could be used by other organizations to access your [place] medical records  [id number]        Your Vitals Were     Pulse Temperature Height Pulse Oximetry BMI (Body Mass Index)       103 100.3  F (37.9  C) (Tympanic) 5' 7.25\" (1.708 m) 99% 32.55 kg/m2        Blood Pressure from Last 3 Encounters:   03/29/17 126/87   02/26/17 126/89   01/26/17 (!) 136/94    Weight from Last 3 Encounters:   03/29/17 209 lb 6.4 oz (95 kg)   02/26/17 200 lb (90.7 kg)   01/26/17 204 lb (92.5 kg)              We Performed the Following     Influenza A/B antigen          Today's Medication Changes          These changes are accurate as of: 3/29/17  9:45 AM.  If you have any questions, ask your nurse or doctor.               Start taking these medicines.        Dose/Directions    oseltamivir 75 MG capsule   Commonly known as:  TAMIFLU   Used for:  Influenza A   Started by:  Tiff Penn APRN CNP        Dose:  75 mg   Take 1 capsule (75 mg) by mouth 2 times daily   Quantity:  10 capsule   Refills:  0            Where to get your medicines      These medications [facility] [place] [facility] [address]  [address] [address]     Phone:  [phone]     oseltamivir 75 MG capsule                Primary Care Provider Office Phone # Fax #    Nano Quinonez [phone]       [facility] [address] [address]        Thank you!     Thank you for choosing [facility]  for your care. Our goal is always to provide you with excellent care. Hearing back from our patients is one " way we can continue to improve our services. Please take a few minutes to complete the written survey that you may receive in the mail after your visit with us. Thank you!             Your Updated Medication List - Protect others around you: Learn how to safely use, store and throw away your medicines at www.disposemymeds.org.          This list is accurate as of: 3/29/17  9:45 AM.  Always use your most recent med list.                   Brand Name Dispense Instructions for use    buPROPion 150 MG 24 hr tablet    WELLBUTRIN XL    90 tablet    Take 1 tablet (150 mg) by mouth every morning       oseltamivir 75 MG capsule    TAMIFLU    10 capsule    Take 1 capsule (75 mg) by mouth 2 times daily

## 2017-03-29 NOTE — PATIENT INSTRUCTIONS
Lungs are clear  For sinus congestion: nasal saline irrigations 4-5 times daily  May use Afrin spray twice daily for nasal congestion for up to 3 days  Drink enough fluids, vitamin V, honey, lemon.  Tylenol 500 mg 4 times daily as needed for fevers over 101 F and headaches, body aches.    For cough: Mucinex  mg twice daily with glass of water, or Guaifenesin , or Robitussin (available over the counter).    Influenza test:  Positive for type A    Start Tamiflu 75 mg twice daily for 5 days (this is antiviral medication, will shorten the duration of your symptoms), it would be more effective if you would start it earlier.     Follow up in 4-5 days if not improving

## 2017-03-29 NOTE — MR AVS SNAPSHOT
After Visit Summary   3/29/2017    Consuelo Medellin    MRN: [id number]           Patient Information     Date Of Birth          1970        Visit Information        Provider Department      3/29/2017 10:15 AM DANNIELLE CRANDALL FP/IM RN [facility]        Today's Diagnoses     Flu-like symptoms    -  1    Influenza A           Follow-ups after your visit        Your next 10 appointments already scheduled     Mar 29, 2017 10:15 AM CDT   Nurse Only with DANNIELLE CRANDALL FP/IM RN   [facility] ([facility]    [address] [id number] [phone]              Who to contact     If you have questions or need follow up information about today's clinic visit or your schedule please contact [facility] directly at [phone].  Normal or non-critical lab and imaging results will be communicated to you by RAP Indexhart, letter or phone within 4 business days after the clinic has received the results. If you do not hear from us within 7 days, please contact the clinic through RAP Indexhart or phone. If you have a critical or abnormal lab result, we will notify you by phone as soon as possible.  Submit refill requests through [employer] or call your pharmacy and they will forward the refill request to us. Please allow 3 business days for your refill to be completed.          Additional Information About Your Visit        MyChart Information     [employer] gives you secure access to your electronic health record. If you see a primary care provider, you can also send messages to your care team and make appointments. If you have questions, please call your primary care clinic.  If you do not have a primary care provider, please call [phone] and they will assist you.        Care EveryWhere ID     This is your Care EveryWhere ID. This could be used by other organizations to access your [place] medical records  [id number]         Blood Pressure from Last 3 Encounters:    03/29/17 126/87   02/26/17 126/89   01/26/17 (!) 136/94    Weight from Last 3 Encounters:   03/29/17 209 lb 6.4 oz (95 kg)   02/26/17 200 lb (90.7 kg)   01/26/17 204 lb (92.5 kg)              Today, you had the following     No orders found for display         Today's Medication Changes          These changes are accurate as of: 3/29/17 10:08 AM.  If you have any questions, ask your nurse or doctor.               Start taking these medicines.        Dose/Directions    oseltamivir 75 MG capsule   Commonly known as:  TAMIFLU   Used for:  Influenza A   Started by:  Tiff Penn APRN CNP        Dose:  75 mg   Take 1 capsule (75 mg) by mouth 2 times daily   Quantity:  10 capsule   Refills:  0            Where to get your medicines      These medications were sent to [facility] [address]  [address] [address]     Phone:  [phone]     oseltamivir 75 MG capsule                Primary Care Provider Office Phone # Fax #    Nano Quinonez [phone]       [facility] [address] [address]        Thank you!     Thank you for choosing [facility]  for your care. Our goal is always to provide you with excellent care. Hearing back from our patients is one way we can continue to improve our services. Please take a few minutes to complete the written survey that you may receive in the mail after your visit with us. Thank you!             Your Updated Medication List - Protect others around you: Learn how to safely use, store and throw away your medicines at www.disposemymeds.org.          This list is accurate as of: 3/29/17 10:08 AM.  Always use your most recent med list.                   Brand Name Dispense Instructions for use    buPROPion 150 MG 24 hr tablet    WELLBUTRIN XL    90 tablet    Take 1 tablet (150 mg) by mouth every morning       oseltamivir 75 MG capsule    TAMIFLU    10 capsule     Take 1 capsule (75 mg) by mouth 2 times daily

## 2017-03-29 NOTE — NURSING NOTE
"Chief Complaint   Patient presents with     URI     Saturday 3/25       Initial /87  Pulse 103  Temp 100.3  F (37.9  C) (Tympanic)  Ht 5' 7.25\" (1.708 m)  Wt 209 lb 6.4 oz (95 kg)  SpO2 99%  BMI 32.55 kg/m2 Estimated body mass index is 32.55 kg/(m^2) as calculated from the following:    Height as of this encounter: 5' 7.25\" (1.708 m).    Weight as of this encounter: 209 lb 6.4 oz (95 kg).  Medication Reconciliation: complete  "

## 2017-04-03 ENCOUNTER — OFFICE VISIT (OUTPATIENT)
Dept: FAMILY MEDICINE | Facility: CLINIC | Age: 47
End: 2017-04-03
Payer: COMMERCIAL

## 2017-04-03 VITALS
RESPIRATION RATE: 16 BRPM | HEIGHT: 67 IN | BODY MASS INDEX: 32.71 KG/M2 | TEMPERATURE: 97.7 F | HEART RATE: 73 BPM | WEIGHT: 208.4 LBS | DIASTOLIC BLOOD PRESSURE: 89 MMHG | SYSTOLIC BLOOD PRESSURE: 130 MMHG | OXYGEN SATURATION: 96 %

## 2017-04-03 DIAGNOSIS — R07.0 THROAT PAIN: ICD-10-CM

## 2017-04-03 DIAGNOSIS — J02.0 STREPTOCOCCAL SORE THROAT: Primary | ICD-10-CM

## 2017-04-03 LAB
DEPRECATED S PYO AG THROAT QL EIA: ABNORMAL
MICRO REPORT STATUS: ABNORMAL
SPECIMEN SOURCE: ABNORMAL

## 2017-04-03 PROCEDURE — 87880 STREP A ASSAY W/OPTIC: CPT | Performed by: NURSE PRACTITIONER

## 2017-04-03 PROCEDURE — 99213 OFFICE O/P EST LOW 20 MIN: CPT | Performed by: NURSE PRACTITIONER

## 2017-04-03 RX ORDER — PENICILLIN V POTASSIUM 500 MG/1
500 TABLET, FILM COATED ORAL 2 TIMES DAILY
Qty: 20 TABLET | Refills: 0 | Status: SHIPPED | OUTPATIENT
Start: 2017-04-03 | End: 2017-08-13

## 2017-04-03 ASSESSMENT — ANXIETY QUESTIONNAIRES
1. FEELING NERVOUS, ANXIOUS, OR ON EDGE: NOT AT ALL
GAD7 TOTAL SCORE: 0
6. BECOMING EASILY ANNOYED OR IRRITABLE: NOT AT ALL
7. FEELING AFRAID AS IF SOMETHING AWFUL MIGHT HAPPEN: NOT AT ALL
2. NOT BEING ABLE TO STOP OR CONTROL WORRYING: NOT AT ALL
3. WORRYING TOO MUCH ABOUT DIFFERENT THINGS: NOT AT ALL
5. BEING SO RESTLESS THAT IT IS HARD TO SIT STILL: NOT AT ALL

## 2017-04-03 ASSESSMENT — PATIENT HEALTH QUESTIONNAIRE - PHQ9: 5. POOR APPETITE OR OVEREATING: NOT AT ALL

## 2017-04-03 NOTE — PROGRESS NOTES
SUBJECTIVE:                                                    Consuelo Medellin is a 47 year old female who presents to clinic today for the following health issues:      ENT Symptoms             Symptoms: cc Present Absent Comment   Fever/Chills   x Last week   Fatigue  x     Muscle Aches  x     Eye Irritation   x    Sneezing  x     Nasal Kash/Drg  x     Sinus Pressure/Pain  x     Loss of smell   x    Dental pain   x    Sore Throat x x     Swollen Glands x x     Ear Pain/Fullness   x    Cough  x     Wheeze  x     Chest Pain  x  Heavy feeling, hard to take deep breath   Shortness of breath   x    Rash   x    Other         Symptom duration:  Diagnosed with flu 3/29/2017, Sore throat started Saturday night   Symptom severity:  mod   Treatments tried:  tamiflu   Contacts:  children have had strep throat and bronchitis             Problem list and histories reviewed & adjusted, as indicated.  Additional history: states she just had influenza as did her . From her past experience she feels like she now has strep and wishes to be checked for that.  She has 4 kids, none are currently ill.    Patient Active Problem List   Diagnosis     CARDIOVASCULAR SCREENING; LDL GOAL LESS THAN 160     Blood type O+     Inguinal hernia     Obesity     Attention deficit disorder of adult     Major depressive disorder, recurrent episode, mild (H)     Thyroid nodule     Seasonal affective disorder (H)     Past Surgical History:   Procedure Laterality Date     GYN SURGERY      C section     HERNIORRHAPHY INGUINAL  10/26/2011    Procedure:HERNIORRHAPHY INGUINAL; Right Inguinal Herniorrhapy with Mesh Placement; Surgeon:ABBIE SHIELDS; Location:[place] OR     HERNIORRHAPHY VENTRAL N/A 1/4/2017    Procedure: HERNIORRHAPHY VENTRAL;  Surgeon: Stevie Bangura MD;  Location: [facility] OR     PE TUBES       THYROIDECTOMY Left 3/8/2016    Procedure: THYROIDECTOMY;  Surgeon: Abbie Ornelas MD;  Location:  OR       Social History  "  Substance Use Topics     Smoking status: Never Smoker     Smokeless tobacco: Never Used     Alcohol use Yes      Comment: rare     Family History   Problem Relation Age of Onset     HEART DISEASE Mother      arrythmia     DIABETES Father      C.A.D. Father      Coronary Artery Disease Father      C.A.D. Paternal Grandfather      DIABETES Paternal Grandfather          Current Outpatient Prescriptions   Medication Sig Dispense Refill     penicillin V potassium (VEETID) 500 MG tablet Take 1 tablet (500 mg) by mouth 2 times daily 20 tablet 0     buPROPion (WELLBUTRIN XL) 150 MG 24 hr tablet Take 1 tablet (150 mg) by mouth every morning 90 tablet 3     Allergies   Allergen Reactions     Nkda [No Known Drug Allergies]      Wasps [Hornets] Nausea and Vomiting and Hives       Reviewed and updated as needed this visit by clinical staff  Tobacco  Allergies  Med Hx  Surg Hx  Fam Hx  Soc Hx      Reviewed and updated as needed this visit by Provider          ROS: 10 point ROS neg other than the symptoms noted above in the HPI.    OBJECTIVE:                                                    /89 (BP Location: Right arm, Patient Position: Chair, Cuff Size: Adult Large)  Pulse 73  Temp 97.7  F (36.5  C) (Oral)  Resp 16  Ht 5' 7.25\" (1.708 m)  Wt 208 lb 6.4 oz (94.5 kg)  SpO2 96%  BMI 32.4 kg/m2  Body mass index is 32.4 kg/(m^2).  GENERAL: healthy, alert and no distress  HENT: ear canals and TM's normal, pharynx with moderate erythema  NECK: tonsillar adenopathy, no asymmetry  RESP: lungs clear to auscultation - no rales, rhonchi or wheezes  CV: regular rate and rhythm, normal S1 S2, no S3 or S4, no murmur  MS: no gross musculoskeletal defects noted      Diagnostic Test Results:  Results for orders placed or performed in visit on 04/03/17 (from the past 24 hour(s))   Strep, Rapid Screen   Result Value Ref Range    Specimen Description Throat     Rapid Strep A Screen (A)      POSITIVE: Group A Streptococcal " antigen detected by immunoassay.    Micro Report Status FINAL 04/03/2017         ASSESSMENT/PLAN:                                                            1. Streptococcal sore throat    - penicillin V potassium (VEETID) 500 MG tablet; Take 1 tablet (500 mg) by mouth 2 times daily  Dispense: 20 tablet; Refill: 0  Discussed how to take the medication(s), expected outcomes, potential side effects.    2. Throat pain    - Strep, Rapid Screen    See Patient Instructions  Patient Instructions               Pharyngitis Strep (Strep Throat) Adult   Information About Your Condition:  Description  Sore throat is a common symptom that ranges in severity from just a sense of scratchiness to severe pain. Pharyngitis is the medical term for sore throat.   Strep throat is an inflamed (red and swollen) throat caused by infection with a kind of bacteria called group A Streptococci. With treatment, the fever and sore throat pain are usually gone within 24 hours. After taking antibiotics for 24 hours you are no longer contagious. It is important to treat strep throat to prevent some rare but serious complications such as rheumatic fever (a disease that affects the heart) or glomerulonephritis (a disease that affects the kidneys).   Symptoms   The symptoms of a strep infection may include one or more of the following:   sore, red throat   painful swallowing   fever   chills   headaches   muscle aches and pains   tired feeling   swollen, tender lymph nodes (glands) in the neck   loss of appetite   Causes  Strep throat is caused by infection with bacteria called Group A Streptococci. Strep infections are very contagious. They are usually passed directly from person to person. Strep throat is most common in school-age children, who then often pass it to the rest of the family. It most often occurs from November through April, but it can happen any time of year.   What You Should Do At Home (Follow-up Care)   It is important to get  plenty of rest when you are not feeling well so that your body may focus its energy on getting you healthy.   If you smoke, stop. If someone else in your household smokes ask them to smoke outside.   If you were given a prescription for antibiotics, be sure to get it filled right away. Follow the directions exactly. Take the medicine until it is completely gone. Do not stop taking it just because you feel better.   Acetaminophen (Tylenol ) or over-the-counter anti-inflammatory medicine such as ibuprofen (Motrin , Advil ) or naproxen (Aleve , Naprosyn ) may help decrease fever and pain. You should not take ibuprofen or naproxen if you have a history of bleeding in your stomach.   As long as your healthcare provider has not told you differently, drink plenty of liquids. An average adult should drink at least 6 to 10 eight-ounce glasses of liquids that do not contain alcohol (including beer or wine), such as water, juice, or weak tea each day. One way to tell if you are drinking enough liquid is to look at the color of your urine (pee). It should be very light yellow.   If eating hurts your throat, don't force yourself to eat solid food. When you are able to eat more foods, choose healthy food to give you strength and to help fight the infection.   If the air in your home is dry, a cool-mist humidifier can moisten the air and help make breathing easier. Be sure to clean your humidifier often so that bacteria and mold can t grow. You can also try running hot water in the shower or bathtub to steam up the bathroom. Sit in there for 10 to 15 minutes if you are coughing hard or having trouble breathing.   Gargle with salt water. Stir 1/2 teaspoon salt in an 8-ounce glass of warm water to make your own salt water gargle.   Suck on lozenges or hard candy.   Don't talk a lot. Rest your voice.   Avoid close contact with other people until you have been taking the antibiotic for 24 to 48 hours so they will not be exposed to the  strep bacteria.   Cover your nose and mouth with a tissue when coughing or sneezing and then throw it away in the nearest waste receptacle.   Wash your hands often with warm water and soap for at least 15 seconds before you touch food, dishes, glasses, silverware, or cloth napkins. You can also carry an alcohol-based hand  with you to clean your hands when soap and water aren t available.   Wash your hands after you cough.   Please keep all medicines out of the reach of children.   What You Can Do To Stay Healthy  Be careful not to let your nose or mouth touch public telephones or drinking fountains.   Use paper cups and paper towels in bathrooms instead of shared drinking cups and hand towels.   Do not share food and eating utensils with others.   Stay away from people known to be sick.   Care Alerts  Call 911 if:  You have trouble breathing or swallowing.   Your feel like your throat or tongue are swelling.   Call Your Healthcare Provider Right Away Or Return To The Emergency Department If:  You are coughing up yellow or greenish phlegm (mucus.)   You have a severe headache that does not get better with acetaminophen or ibuprofen.   You start to have a very stiff neck and have pain when you bend your head forward.   You have a fever higher than 101.5  F (38.6  C) orally that does not go down after taking acetaminophen or ibuprofen.   You have any symptoms that worry you        Thank you for choosing [facility].  You may be receiving a survey in the mail from [employer] regarding your visit today.  Please take a few minutes to complete and return the survey to let us know how we are doing.      Our Clinic hours are:  Mondays    7:20 am - 7 pm  Tues -  Fri  7:20 am - 5 pm    Clinic Phone: [phone]    The clinic lab opens at 7:30 am Mon - Fri and appointments are required.    [facility]. [phone]  Monday-Thursday 8 am - 7pm  Tues/Wed/Fri 8 am - 5:30 pm             [place]  MICHAEL [facility]

## 2017-04-03 NOTE — PATIENT INSTRUCTIONS
Pharyngitis Strep (Strep Throat) Adult   Information About Your Condition:  Description  Sore throat is a common symptom that ranges in severity from just a sense of scratchiness to severe pain. Pharyngitis is the medical term for sore throat.   Strep throat is an inflamed (red and swollen) throat caused by infection with a kind of bacteria called group A Streptococci. With treatment, the fever and sore throat pain are usually gone within 24 hours. After taking antibiotics for 24 hours you are no longer contagious. It is important to treat strep throat to prevent some rare but serious complications such as rheumatic fever (a disease that affects the heart) or glomerulonephritis (a disease that affects the kidneys).   Symptoms   The symptoms of a strep infection may include one or more of the following:   sore, red throat   painful swallowing   fever   chills   headaches   muscle aches and pains   tired feeling   swollen, tender lymph nodes (glands) in the neck   loss of appetite   Causes  Strep throat is caused by infection with bacteria called Group A Streptococci. Strep infections are very contagious. They are usually passed directly from person to person. Strep throat is most common in school-age children, who then often pass it to the rest of the family. It most often occurs from November through April, but it can happen any time of year.   What You Should Do At Home (Follow-up Care)   It is important to get plenty of rest when you are not feeling well so that your body may focus its energy on getting you healthy.   If you smoke, stop. If someone else in your household smokes ask them to smoke outside.   If you were given a prescription for antibiotics, be sure to get it filled right away. Follow the directions exactly. Take the medicine until it is completely gone. Do not stop taking it just because you feel better.   Acetaminophen (Tylenol ) or over-the-counter anti-inflammatory medicine such as  ibuprofen (Motrin , Advil ) or naproxen (Aleve , Naprosyn ) may help decrease fever and pain. You should not take ibuprofen or naproxen if you have a history of bleeding in your stomach.   As long as your healthcare provider has not told you differently, drink plenty of liquids. An average adult should drink at least 6 to 10 eight-ounce glasses of liquids that do not contain alcohol (including beer or wine), such as water, juice, or weak tea each day. One way to tell if you are drinking enough liquid is to look at the color of your urine (pee). It should be very light yellow.   If eating hurts your throat, don't force yourself to eat solid food. When you are able to eat more foods, choose healthy food to give you strength and to help fight the infection.   If the air in your home is dry, a cool-mist humidifier can moisten the air and help make breathing easier. Be sure to clean your humidifier often so that bacteria and mold can t grow. You can also try running hot water in the shower or bathtub to steam up the bathroom. Sit in there for 10 to 15 minutes if you are coughing hard or having trouble breathing.   Gargle with salt water. Stir 1/2 teaspoon salt in an 8-ounce glass of warm water to make your own salt water gargle.   Suck on lozenges or hard candy.   Don't talk a lot. Rest your voice.   Avoid close contact with other people until you have been taking the antibiotic for 24 to 48 hours so they will not be exposed to the strep bacteria.   Cover your nose and mouth with a tissue when coughing or sneezing and then throw it away in the nearest waste receptacle.   Wash your hands often with warm water and soap for at least 15 seconds before you touch food, dishes, glasses, silverware, or cloth napkins. You can also carry an alcohol-based hand  with you to clean your hands when soap and water aren t available.   Wash your hands after you cough.   Please keep all medicines out of the reach of children.   What  You Can Do To Stay Healthy  Be careful not to let your nose or mouth touch public telephones or drinking fountains.   Use paper cups and paper towels in bathrooms instead of shared drinking cups and hand towels.   Do not share food and eating utensils with others.   Stay away from people known to be sick.   Care Alerts  Call 911 if:  You have trouble breathing or swallowing.   Your feel like your throat or tongue are swelling.   Call Your Healthcare Provider Right Away Or Return To The Emergency Department If:  You are coughing up yellow or greenish phlegm (mucus.)   You have a severe headache that does not get better with acetaminophen or ibuprofen.   You start to have a very stiff neck and have pain when you bend your head forward.   You have a fever higher than 101.5  F (38.6  C) orally that does not go down after taking acetaminophen or ibuprofen.   You have any symptoms that worry you        Thank you for choosing [facility].  You may be receiving a survey in the mail from [employer] [facility] regarding your visit today.  Please take a few minutes to complete and return the survey to let us know how we are doing.      Our Clinic hours are:  Mondays    7:20 am - 7 pm  Tues -  Fri  7:20 am - 5 pm    Clinic Phone: [phone]    The clinic lab opens at 7:30 am Mon - Fri and appointments are required.    [facility]. [phone]  Monday-Thursday 8 am - 7pm  Tues/Wed/Fri 8 am - 5:30 pm

## 2017-04-03 NOTE — NURSING NOTE
"Chief Complaint   Patient presents with     URI       Initial BP (!) 150/93 (BP Location: Right arm, Patient Position: Chair, Cuff Size: Adult Regular)  Pulse 73  Temp 97.7  F (36.5  C) (Oral)  Resp 16  Ht 5' 7.25\" (1.708 m)  Wt 208 lb 6.4 oz (94.5 kg)  SpO2 96%  BMI 32.4 kg/m2 Estimated body mass index is 32.4 kg/(m^2) as calculated from the following:    Height as of this encounter: 5' 7.25\" (1.708 m).    Weight as of this encounter: 208 lb 6.4 oz (94.5 kg).  Medication Reconciliation: complete    "

## 2017-04-03 NOTE — MR AVS SNAPSHOT
After Visit Summary   4/3/2017    Consuelo Medellin    MRN: [id number]           Patient Information     Date Of Birth          1970        Visit Information        Provider Department      4/3/2017 12:40 PM Niecy Peralta APRN [facility]        Today's Diagnoses     Streptococcal sore throat    -  1    Throat pain          Care Instructions                Pharyngitis Strep (Strep Throat) Adult   Information About Your Condition:  Description  Sore throat is a common symptom that ranges in severity from just a sense of scratchiness to severe pain. Pharyngitis is the medical term for sore throat.   Strep throat is an inflamed (red and swollen) throat caused by infection with a kind of bacteria called group A Streptococci. With treatment, the fever and sore throat pain are usually gone within 24 hours. After taking antibiotics for 24 hours you are no longer contagious. It is important to treat strep throat to prevent some rare but serious complications such as rheumatic fever (a disease that affects the heart) or glomerulonephritis (a disease that affects the kidneys).   Symptoms   The symptoms of a strep infection may include one or more of the following:   sore, red throat   painful swallowing   fever   chills   headaches   muscle aches and pains   tired feeling   swollen, tender lymph nodes (glands) in the neck   loss of appetite   Causes  Strep throat is caused by infection with bacteria called Group A Streptococci. Strep infections are very contagious. They are usually passed directly from person to person. Strep throat is most common in school-age children, who then often pass it to the rest of the family. It most often occurs from November through April, but it can happen any time of year.   What You Should Do At Home (Follow-up Care)   It is important to get plenty of rest when you are not feeling well so that your body may focus its energy on getting you healthy.   If  you smoke, stop. If someone else in your household smokes ask them to smoke outside.   If you were given a prescription for antibiotics, be sure to get it filled right away. Follow the directions exactly. Take the medicine until it is completely gone. Do not stop taking it just because you feel better.   Acetaminophen (Tylenol ) or over-the-counter anti-inflammatory medicine such as ibuprofen (Motrin , Advil ) or naproxen (Aleve , Naprosyn ) may help decrease fever and pain. You should not take ibuprofen or naproxen if you have a history of bleeding in your stomach.   As long as your healthcare provider has not told you differently, drink plenty of liquids. An average adult should drink at least 6 to 10 eight-ounce glasses of liquids that do not contain alcohol (including beer or wine), such as water, juice, or weak tea each day. One way to tell if you are drinking enough liquid is to look at the color of your urine (pee). It should be very light yellow.   If eating hurts your throat, don't force yourself to eat solid food. When you are able to eat more foods, choose healthy food to give you strength and to help fight the infection.   If the air in your home is dry, a cool-mist humidifier can moisten the air and help make breathing easier. Be sure to clean your humidifier often so that bacteria and mold can t grow. You can also try running hot water in the shower or bathtub to steam up the bathroom. Sit in there for 10 to 15 minutes if you are coughing hard or having trouble breathing.   Gargle with salt water. Stir 1/2 teaspoon salt in an 8-ounce glass of warm water to make your own salt water gargle.   Suck on lozenges or hard candy.   Don't talk a lot. Rest your voice.   Avoid close contact with other people until you have been taking the antibiotic for 24 to 48 hours so they will not be exposed to the strep bacteria.   Cover your nose and mouth with a tissue when coughing or sneezing and then throw it away in  the nearest waste receptacle.   Wash your hands often with warm water and soap for at least 15 seconds before you touch food, dishes, glasses, silverware, or cloth napkins. You can also carry an alcohol-based hand  with you to clean your hands when soap and water aren t available.   Wash your hands after you cough.   Please keep all medicines out of the reach of children.   What You Can Do To Stay Healthy  Be careful not to let your nose or mouth touch public telephones or drinking fountains.   Use paper cups and paper towels in bathrooms instead of shared drinking cups and hand towels.   Do not share food and eating utensils with others.   Stay away from people known to be sick.   Care Alerts  Call 911 if:  You have trouble breathing or swallowing.   Your feel like your throat or tongue are swelling.   Call Your Healthcare Provider Right Away Or Return To The Emergency Department If:  You are coughing up yellow or greenish phlegm (mucus.)   You have a severe headache that does not get better with acetaminophen or ibuprofen.   You start to have a very stiff neck and have pain when you bend your head forward.   You have a fever higher than 101.5  F (38.6  C) orally that does not go down after taking acetaminophen or ibuprofen.   You have any symptoms that worry you        Thank you for choosing [facility]3].  You may be receiving a survey in the mail from [employer] regarding your visit today.  Please take a few minutes to complete and return the survey to let us know how we are doing.      Our Clinic hours are:  Mondays    7:20 am - 7 pm  Tues -  Fri  7:20 am - 5 pm    Clinic Phone: [phone]    The clinic lab opens at 7:30 am Mon - Fri and appointments are required.    [facility]  Ph. [phone]  Monday-Thursday 8 am - 7pm  Tues/Wed/Fri 8 am - 5:30 pm               Follow-ups after your visit        Who to contact     If you have questions or need follow up information about  "today's clinic visit or your schedule please contact [facility] directly at [phone].  Normal or non-critical lab and imaging results will be communicated to you by [employer], letter or phone within 4 business days after the clinic has received the results. If you do not hear from us within 7 days, please contact the clinic through [employer] or phone. If you have a critical or abnormal lab result, we will notify you by phone as soon as possible.  Submit refill requests through [employer] or call your pharmacy and they will forward the refill request to us. Please allow 3 business days for your refill to be completed.          Additional Information About Your Visit        [employer] Information     [employer] gives you secure access to your electronic health record. If you see a primary care provider, you can also send messages to your care team and make appointments. If you have questions, please call your primary care clinic.  If you do not have a primary care provider, please call [phone] and they will assist you.        Care EveryWhere ID     This is your Care EveryWhere ID. This could be used by other organizations to access your [place] medical records  [id number]        Your Vitals Were     Pulse Temperature Respirations Height Pulse Oximetry BMI (Body Mass Index)    73 97.7  F (36.5  C) (Oral) 16 5' 7.25\" (1.708 m) 96% 32.4 kg/m2       Blood Pressure from Last 3 Encounters:   04/03/17 130/89   03/29/17 126/87   02/26/17 126/89    Weight from Last 3 Encounters:   04/03/17 208 lb 6.4 oz (94.5 kg)   03/29/17 209 lb 6.4 oz (95 kg)   02/26/17 200 lb (90.7 kg)              We Performed the Following     Strep, Rapid Screen          Today's Medication Changes          These changes are accurate as of: 4/3/17  1:24 PM.  If you have any questions, ask your nurse or doctor.               Start taking these medicines.        Dose/Directions    penicillin V potassium 500 MG tablet   Commonly known as:  " VEETID   Used for:  Streptococcal sore throat   Started by:  Niecy Perlata APRN CNP        Dose:  500 mg   Take 1 tablet (500 mg) by mouth 2 times daily   Quantity:  20 tablet   Refills:  0         Stop taking these medicines if you haven't already. Please contact your care team if you have questions.     oseltamivir 75 MG capsule   Commonly known as:  TAMIFLU   Stopped by:  Niecy Peralta APRN CNP                Where to get your medicines      These medications were sent to [facility] [place], [place] - [address]  [address] [facility] [address]     Phone:  [phone]     penicillin V potassium 500 MG tablet                Primary Care Provider Office Phone # Fax #    Nano Shelly Quinonez [phone]       [facility] [address] [facility] [address]        Thank you!     Thank you for choosing [facility]  for your care. Our goal is always to provide you with excellent care. Hearing back from our patients is one way we can continue to improve our services. Please take a few minutes to complete the written survey that you may receive in the mail after your visit with us. Thank you!             Your Updated Medication List - Protect others around you: Learn how to safely use, store and throw away your medicines at www.disposemymeds.org.          This list is accurate as of: 4/3/17  1:24 PM.  Always use your most recent med list.                   Brand Name Dispense Instructions for use    buPROPion 150 MG 24 hr tablet    WELLBUTRIN XL    90 tablet    Take 1 tablet (150 mg) by mouth every morning       penicillin V potassium 500 MG tablet    VEETID    20 tablet    Take 1 tablet (500 mg) by mouth 2 times daily

## 2017-04-04 ASSESSMENT — PATIENT HEALTH QUESTIONNAIRE - PHQ9: SUM OF ALL RESPONSES TO PHQ QUESTIONS 1-9: 0

## 2017-04-04 ASSESSMENT — ANXIETY QUESTIONNAIRES: GAD7 TOTAL SCORE: 0

## 2017-08-13 ENCOUNTER — HOSPITAL ENCOUNTER (EMERGENCY)
Facility: CLINIC | Age: 47
Discharge: HOME | End: 2017-08-13
Attending: NURSE PRACTITIONER | Admitting: NURSE PRACTITIONER
Payer: MEDICAID

## 2017-08-13 VITALS
RESPIRATION RATE: 16 BRPM | SYSTOLIC BLOOD PRESSURE: 155 MMHG | TEMPERATURE: 98.2 F | WEIGHT: 213 LBS | BODY MASS INDEX: 33.11 KG/M2 | OXYGEN SATURATION: 98 % | HEART RATE: 69 BPM | DIASTOLIC BLOOD PRESSURE: 99 MMHG

## 2017-08-13 DIAGNOSIS — S39.012A: Primary | ICD-10-CM

## 2017-08-13 PROCEDURE — 99212 OFFICE O/P EST SF 10 MIN: CPT

## 2017-08-13 PROCEDURE — 99213 OFFICE O/P EST LOW 20 MIN: CPT | Performed by: NURSE PRACTITIONER

## 2017-08-13 RX ORDER — IBUPROFEN 800 MG/1
800 TABLET, FILM COATED ORAL EVERY 8 HOURS PRN
Qty: 60 TABLET | Refills: 0 | Status: SHIPPED | OUTPATIENT
Start: 2017-08-13 | End: 2017-08-21

## 2017-08-13 RX ORDER — OXYCODONE AND ACETAMINOPHEN 5; 325 MG/1; MG/1
1 TABLET ORAL EVERY 4 HOURS PRN
Qty: 18 TABLET | Refills: 0 | Status: SHIPPED | OUTPATIENT
Start: 2017-08-13 | End: 2018-05-02

## 2017-08-13 RX ORDER — CYCLOBENZAPRINE HCL 10 MG
10 TABLET ORAL
Qty: 14 TABLET | Refills: 1 | Status: SHIPPED | OUTPATIENT
Start: 2017-08-13 | End: 2017-09-15

## 2017-08-13 NOTE — ED NOTES
Patient here for pain in the right hip/lower back and leg, symptoms started 9 days ago.  Patient presents in wheel chair to the urgent care.

## 2017-08-13 NOTE — DISCHARGE INSTRUCTIONS
Use ibuprofen 800 mg up to 3 times per day if needed for pain. Stop if it is causing nausea or abdominal pain.   Add Percocet 5-325 (oxycodone-acetaminophen) 1-2 pills up to every 4 hours if needed for pain. Do not use alcohol, operate machinery, drive, or climb on ladders for 8 hours after taking Percocet. Use docusate (100mg) 2 times a day to prevent constipation while on narcotics.    Lumbar Flexion (Flexibility)    1. Lie on your back on the floor, with your knees bent and your feet flat on the floor.  2. Gently pull your knees up toward your chest. Put your hands under your thighs to help pull your knees up.  3. Press your lower back down to the floor. Hold for 20 seconds.  4. Lower your legs back down to the floor and relax.  5. Repeat 2 times, or as instructed.  Date Last Reviewed: 3/10/2016    0855-5509 [employer]. [address] [place] [address]. All rights reserved. This information is not intended as a substitute for professional medical care. Always follow your healthcare professional's instructions.          Back Sprain or Strain    Injury to the muscles (strain) or ligaments (sprain) around the spine can be troubling. Injury may occur after a sudden forceful twisting or bending force such as in a car accident, after a simple awkward movement, or after lifting something heavy with poor body positioning. In any case, muscle spasm is often present and adds to the pain.  Thankfully, most people feel better in 1 to 2 weeks, and most of the rest in 1 to 2 months. Most people can remain active. Unless you had a forceful or traumatic physical injury such as a car accident or fall, X-rays may not be ordered for the first evaluation of a back sprain or strain. If pain continues and does not respond to medical treatment, your healthcare provider may then order X-rays and other tests.  Home care  The following guidelines will help you care for your injury at home:    When in bed, try to  find a comfortable position. A firm mattress is best. Try lying flat on your back with pillows under your knees. You can also try lying on your side with your knees bent up toward your chest and a pillow between your knees.    Don't sit for long periods. Try not to take long car rides or take other trips that have you sitting for a long time. This puts more stress on the lower back than standing or walking.    During the first 24 to 72 hours after an injury or flare-up, apply an ice pack to the painful area for 20 minutes. Then remove it for 20 minutes. Do this for 60 to 90 minutes, or several times a day. This will reduce swelling and pain. Be sure to wrap the ice pack in a thin towel or plastic to protect your skin.    You can start with ice, then switch to heat. Heat from a hot shower, hot bath, or heating pad reduces pain and works well for muscle spasms. Put heat on the painful area for 20 minutes, then remove for 20 minutes. Do this for 60 to 90 minutes, or several times a day. Do not use a heating pad while sleeping. It can burn the skin.    You can alternate the ice and heat. Talk with your healthcare provider to find out the best treatment or therapy for your back pain.    Therapeutic massage will help relax the back muscles without stretching them.    Be aware of safe lifting methods. Do not lift anything over 15 pounds until all of the pain is gone.  Medicines  Talk to your healthcare provider before using medicines, especially if you have other health problems or are taking other medicines.    You may use acetaminophen or ibuprofen to control pain, unless another pain medicine was prescribed. If you have chronic conditions like diabetes, liver or kidney disease, stomach ulcers, or gastrointestinal bleeding, or are taking blood-thinner medicines, talk with your doctor before taking any medicines.    Be careful if you are given prescription medicines, narcotics, or medicine for muscle spasm. They can cause  drowsiness, and affect your coordination, reflexes, and judgment. Do not drive or operate heavy machinery when taking these types of medicines. Only take pain medicine as prescribed by your healthcare provider.  Follow-up care  Follow up with your healthcare provider, or as advised. You may need physical therapy or more tests if your symptoms get worse.  If you had X-rays your healthcare provider may be checking for any broken bones, breaks, or fractures. Bruises and sprains can sometimes hurt as much as a fracture. These injuries can take time to heal completely. If your symptoms don t improve or they get worse, talk with your healthcare provider. You may need a repeat X-ray or other tests.  Call 911  Call for emergency care if any of the following occur:    Trouble breathing    Confused    Very drowsy or trouble awakening    Fainting or loss of consciousness    Rapid or very slow heart rate    Loss of bowel or bladder control  When to seek medical advice  Call your healthcare provider right away if any of the following occur:    Pain gets worse or spreads to your arms or legs    Weakness or numbness in one or both arms or legs    Numbness in the groin or genital area  Date Last Reviewed: 6/1/2016 2000-2017 [employer]. [address] [place], [place] [id number]. All rights reserved. This information is not intended as a substitute for professional medical care. Always follow your healthcare professional's instructions.          Lumbar Rotation    6. Lie on your back on the floor, with your knees bent and your feet flat on the floor. Don t press your neck or lower back to the floor.  7. Lean both of your knees to one side. Turn your head in the opposite direction. Keep your shoulders flat on the floor. Be gentle and don t push through pain.  8. Hold for 20 seconds. Then slowly move your knees and head in the other direction.  9. Repeat 2 to 5 times, or as instructed.   Date Last Reviewed:  3/10/2016    5786-5979 [employer]. [address] [place] [address], [place] [address]. All rights reserved. This information is not intended as a substitute for professional medical care. Always follow your healthcare professional's instructions.          Understanding Lumbosacral Strain    Lumbosacral strain is a medical term for an injury that causes low back pain. The lumbosacral area (low back) is between the bottom of the ribcage and the top of the buttocks. A strain is tearing of muscles and tendons. These tears can be very small but still cause pain.  How a lumbosacral strain happens  Muscles and tendons connected to the spine can be strained in a number of ways:    Sitting or standing in the same position for long periods of time. This can harm the low back over time. Poor posture can make low back pain more likely.    Moving the muscles and tendons past their usual range of motion. This can cause a sudden injury. This can happen when you twist, bend over, or lift something heavy. Not using correct technique for sports or tasks like lifting can make back injury more likely.    Accidents or falls  Lumbosacral strain can be caused by other problems, but these are less common.  Symptoms of lumbosacral strain  Symptoms may include:    Pain in the back, often on one side    Pain that gets worse with movement and gets better with rest    Inability to move as freely as usual    Swelling, slight redness, and skin warmth in the painful area  Treatment for lumbosacral strain  Low back pain often goes away by itself within several weeks. But it often comes back. Treatment focuses on reducing pain and avoiding further injury. Bed rest is usually not recommended for low back pain. Treatments may include:    Avoiding or changing the action that caused the problem. This helps prevent injuring the tissues again.    Prescription or over-the-counter pain medicines. These help reduce inflammation, swelling, and  pain.    Cold or heat packs. These help reduce pain and swelling.    Stretching and other exercises. These improve flexibility and strength.    Physical therapy. This usually includes exercises and other treatments.    Injections of medicine. This may relieve symptoms.  If these treatments do not relieve symptoms, your healthcare provider may order imaging tests to learn more about the problem. Sometimes you may need surgery.  Possible complications of lumbosacral strain  If the cause of the pain is not addressed, symptoms may return or get worse. Follow your healthcare provider s instructions on lifestyle changes and treating your back.     When to call your healthcare provider  Call your healthcare provider right away if you have any of these:    Fever of 100.4 F (38 C) or higher, or as directed    Numbness, tingling, or weakness    Problems with bowel or bladder control, or problems having sex    Pain that does not go away, or gets worse    New symptoms   Date Last Reviewed: 3/10/2016    0491-5663 [employer]. [address] [place] [address]. All rights reserved. This information is not intended as a substitute for professional medical care. Always follow your healthcare professional's instructions.          Lumbar Extension (Flexibility)    10. Lie face down on your stomach, forehead on the floor. You can lie on a mat or towel.  11. Bend your arms next to your body and lift your upper body up on your forearms. Your palms and forearms should be flat on the floor. Keep your stomach and hips on the floor.  12. Hold your upper body up with your forearms for 20 seconds. Slowly lower back down to the floor.  13. Repeat 2 times, or as instructed.  Date Last Reviewed: 3/10/2016    0801-3831 [employer]. [address] [place] [address]. All rights reserved. This information is not intended as a substitute for professional medical care. Always follow your healthcare  professional's instructions.

## 2017-08-13 NOTE — ED AVS SNAPSHOT
[facility] Emergency Department    [address] [address]    Phone:  [phone]    Fax:  [phone]                                       Consuelo Medellin   MRN: [id number]    Department:  [facility] Emergency Department   Date of Visit:  8/13/2017           After Visit Summary Signature Page     I have received my discharge instructions, and my questions have been answered. I have discussed any challenges I see with this plan with the nurse or doctor.    ..........................................................................................................................................  Patient/Patient Representative Signature      ..........................................................................................................................................  Patient Representative Print Name and Relationship to Patient    ..................................................               ................................................  Date                                            Time    ..........................................................................................................................................  Reviewed by Signature/Title    ...................................................              ..............................................  Date                                                            Time

## 2017-08-13 NOTE — ED PROVIDER NOTES
History     Chief Complaint   Patient presents with     Back Pain     bent over and injured right back on Monday. Pain continues (pt was on vacation in [place] and unable to get into ) Was hoping to be seen in clinic tomorrow, but came to  today. No numbness or tingling.     HPI  Consuelo Medellin is a 47 year old female who presents with back and hip pain.  Pt states she was bending over to  underwear and had sudden onset of pain in right side hip pain this past Saturday.  Pt states she then got on a plane and went to [place], [place] and then was on van trip throughout [place] and back to [place], [place] yesterday and then flew back in to [facility] this am.  Pt reports that this past Wednesday her  gave her some sciatica exercises and her pain level went down to a two.  Pt reports she typically spent 2-6 hours of van rides and 4-6 hours of walking.  Pt reports that Thursday am she woke up with level 9 pain and pain went from to right anterior thigh with severe pain.  Thursday she went acupunture and did ice and heat and resting and also did laps in hotel room and massage and ibuprofen 400 mg po bid that day.  Pt reports she started taking Tylenol with codeine and took two pills every four hours because this is over the counter in [place] and started this in the evening.  Pt states that Friday the pain had decreased to a 7 and then she started using a cane to go all over [facility].  She feels the pain was aggravated and her pain has worsened to bilateral back pain with radiation to both thighs.She states the pain is radiating and feels like back labor starting.  Pt reports she has never had back or hip pain.  Pt cannot find a comfortable position to be in.  Pt denies fever, rashes, URI symptoms, mental status changes, chest pain.  Reports normal voiding, drinking, and stooling.    I have reviewed the Medications, Allergies, Past Medical and Surgical History, and Social History in the Epic system.    Patient Active Problem  List   Diagnosis     CARDIOVASCULAR SCREENING; LDL GOAL LESS THAN 160     Blood type O+     Inguinal hernia     Obesity     Attention deficit disorder of adult     Major depressive disorder, recurrent episode, mild (H)     Thyroid nodule     Seasonal affective disorder (H)     Past Surgical History:   Procedure Laterality Date     GYN SURGERY      C section     HERNIORRHAPHY INGUINAL  10/26/2011    Procedure:HERNIORRHAPHY INGUINAL; Right Inguinal Herniorrhapy with Mesh Placement; Surgeon:ANITRA SHIELDS; Location:[facility] OR     HERNIORRHAPHY VENTRAL N/A 1/4/2017    Procedure: HERNIORRHAPHY VENTRAL;  Surgeon: Stevie Bangura MD;  Location: [facility] OR     PE TUBES       THYROIDECTOMY Left 3/8/2016    Procedure: THYROIDECTOMY;  Surgeon: Anitra Ornelas MD;  Location: [facility]       Review of Systems  10 point ROS of systems including Constitutional, Eyes, Respiratory, Cardiovascular, Gastroenterology, Genitourinary, Integumentary, Muscularskeletal, Psychiatric were all negative except for pertinent positives noted in my HPI.    Physical Exam   BP: (!) 155/99  Pulse: 69  Temp: 98.2  F (36.8  C)  Resp: 16  Weight: 96.6 kg (213 lb)  SpO2: 98 %  Physical Exam   Constitutional: She appears well-developed and well-nourished. She appears distressed (moderate discomfort).   HENT:   Head: Normocephalic and atraumatic.   Cardiovascular: Normal rate, regular rhythm and normal heart sounds.  Exam reveals no gallop and no friction rub.    No murmur heard.  Pulmonary/Chest: Effort normal and breath sounds normal. No respiratory distress. She has no wheezes. She has no rales. She exhibits no tenderness.   Musculoskeletal: Normal range of motion. She exhibits no edema or deformity.        Lumbar back: She exhibits tenderness (noted over lumbar region and right hip region with radiation to thigh), pain (noted in lumbar region, right hip region) and spasm (noted in bilateral lumbar region). She exhibits normal range of  motion, no bony tenderness, no swelling, no edema, no deformity and no laceration.   Straight leg raise negative, able to complete marching with strength/pulling sensation per patient  No numbness or radiculopathy in extremities; no numbness to anterior thigh   Skin: She is not diaphoretic.   Nursing note and vitals reviewed.      ED Course     ED Course     Procedures     Labs Ordered and Resulted from Time of ED Arrival Up to the Time of Departure from the ED - No data to display    Assessments & Plan (with Medical Decision Making)     I have reviewed the nursing notes.    I have reviewed the findings, diagnosis, plan and need for follow up with the patient.  Consuelo Medellin is a 47 year old female who presents with back and hip pain.  Pt states she was bending over to  underwear and had sudden onset of pain in right side hip pain this past Saturday.  Pt states she then got on a plane and went to [place], [place] and then was on van trip throughout [place] and back to [place], [place] yesterday and then flew back in to [facility] this am.  Pt reports that this past Wednesday her  gave her some sciatica exercises and her pain level went down to a two.  Pt reports she typically spent 2-6 hours of van rides and 4-6 hours of walking.  Pt reports that Thursday am she woke up with level 9 pain and pain went from to right anterior thigh with severe pain.  Thursday she went acupunture and did ice and heat and resting and also did laps in hotel room and massage and ibuprofen 400 mg po bid that day.  Pt reports she started taking Tylenol with codeine and took two pills every four hours because this is over the counter in [place] and started this in the evening.  Pt states that Friday the pain had decreased to a 7 and then she started using a cane to go all over [facility].  She feels the pain was aggravated and her pain has worsened to bilateral back pain with radiation to both thighs.She states the pain is radiating and feels like back  labor starting.  Pt reports she has never had back or hip pain.  Pt cannot find a comfortable position to be in.  Pt denies fever, rashes, URI symptoms, mental status changes, chest pain.  Reports normal voiding, drinking, and stooling.  Exam as noted above; no indication for imaging today.  Treat conservatively.  Explained to patient the short term use of narcotics and not to mix narcotics and flexeril.  Discussed usage of NSAIDS and also referred to physical therapy for rehabilitation.  Given written exercises as well.  Recommend waiting 48 hours prior to starting exercises.  Pt verbalized understanding.  DDx: acute muscle strain, muscle spasm, herniated disc, cauda equina, spinal fracture, spinal stenosis, sciatica, degenerative disease, ligamentous injury, spondylolisthesis, epidural abscess, osteomyelitis, AAA, abdominal etiologies     Discharge Medication List as of 8/13/2017 12:45 PM      START taking these medications    Details   oxyCODONE-acetaminophen (PERCOCET) 5-325 MG per tablet Take 1 tablet by mouth every 4 hours as needed for pain maximum 8 tablet(s) per day, Disp-18 tablet, R-0, Local Print      ibuprofen (ADVIL/MOTRIN) 800 MG tablet Take 1 tablet (800 mg) by mouth every 8 hours as needed for moderate pain, Disp-60 tablet, R-0, E-Prescribe      cyclobenzaprine (FLEXERIL) 10 MG tablet Take 1 tablet (10 mg) by mouth nightly as needed for muscle spasms, Disp-14 tablet, R-1, E-Prescribe             Final diagnoses:   Acute myofascial strain of lumbar region       8/13/2017   [facility] EMERGENCY DEPARTMENT     Maryse Cui, MICHAEL CNP  08/13/17 0838

## 2017-08-13 NOTE — ED AVS SNAPSHOT
[facility]    [address] [facility] [address]    Phone:  [phone]    Fax:  [phone]                                       Consuelo Medellin   MRN: [id number]    Department:  [facility]   Date of Visit:  8/13/2017           Patient Information     Date Of Birth          1970        Your diagnoses for this visit were:     Acute myofascial strain of lumbar region        You were seen by Maryse Cui, MICHAEL CNP.      Follow-up Information     Follow up with Nano Quinonez, NP In 3 days.    Specialty:  Nurse Practitioner - Family    Why:  If symptoms worsen, As needed    Contact information:    [address] [facility] [address]  [phone]          Discharge Instructions         Use ibuprofen 800 mg up to 3 times per day if needed for pain. Stop if it is causing nausea or abdominal pain.   Add Percocet 5-325 (oxycodone-acetaminophen) 1-2 pills up to every 4 hours if needed for pain. Do not use alcohol, operate machinery, drive, or climb on ladders for 8 hours after taking Percocet. Use docusate (100mg) 2 times a day to prevent constipation while on narcotics.    Lumbar Flexion (Flexibility)    1. Lie on your back on the floor, with your knees bent and your feet flat on the floor.  2. Gently pull your knees up toward your chest. Put your hands under your thighs to help pull your knees up.  3. Press your lower back down to the floor. Hold for 20 seconds.  4. Lower your legs back down to the floor and relax.  5. Repeat 2 times, or as instructed.  Date Last Reviewed: 3/10/2016    0093-6683 [employer]. [address] [place] [address]. All rights reserved. This information is not intended as a substitute for professional medical care. Always follow your healthcare professional's instructions.          Back Sprain or Strain    Injury to the muscles (strain) or ligaments (sprain) around the spine can be troubling.  Injury may occur after a sudden forceful twisting or bending force such as in a car accident, after a simple awkward movement, or after lifting something heavy with poor body positioning. In any case, muscle spasm is often present and adds to the pain.  Thankfully, most people feel better in 1 to 2 weeks, and most of the rest in 1 to 2 months. Most people can remain active. Unless you had a forceful or traumatic physical injury such as a car accident or fall, X-rays may not be ordered for the first evaluation of a back sprain or strain. If pain continues and does not respond to medical treatment, your healthcare provider may then order X-rays and other tests.  Home care  The following guidelines will help you care for your injury at home:    When in bed, try to find a comfortable position. A firm mattress is best. Try lying flat on your back with pillows under your knees. You can also try lying on your side with your knees bent up toward your chest and a pillow between your knees.    Don't sit for long periods. Try not to take long car rides or take other trips that have you sitting for a long time. This puts more stress on the lower back than standing or walking.    During the first 24 to 72 hours after an injury or flare-up, apply an ice pack to the painful area for 20 minutes. Then remove it for 20 minutes. Do this for 60 to 90 minutes, or several times a day. This will reduce swelling and pain. Be sure to wrap the ice pack in a thin towel or plastic to protect your skin.    You can start with ice, then switch to heat. Heat from a hot shower, hot bath, or heating pad reduces pain and works well for muscle spasms. Put heat on the painful area for 20 minutes, then remove for 20 minutes. Do this for 60 to 90 minutes, or several times a day. Do not use a heating pad while sleeping. It can burn the skin.    You can alternate the ice and heat. Talk with your healthcare provider to find out the best treatment or therapy  for your back pain.    Therapeutic massage will help relax the back muscles without stretching them.    Be aware of safe lifting methods. Do not lift anything over 15 pounds until all of the pain is gone.  Medicines  Talk to your healthcare provider before using medicines, especially if you have other health problems or are taking other medicines.    You may use acetaminophen or ibuprofen to control pain, unless another pain medicine was prescribed. If you have chronic conditions like diabetes, liver or kidney disease, stomach ulcers, or gastrointestinal bleeding, or are taking blood-thinner medicines, talk with your doctor before taking any medicines.    Be careful if you are given prescription medicines, narcotics, or medicine for muscle spasm. They can cause drowsiness, and affect your coordination, reflexes, and judgment. Do not drive or operate heavy machinery when taking these types of medicines. Only take pain medicine as prescribed by your healthcare provider.  Follow-up care  Follow up with your healthcare provider, or as advised. You may need physical therapy or more tests if your symptoms get worse.  If you had X-rays your healthcare provider may be checking for any broken bones, breaks, or fractures. Bruises and sprains can sometimes hurt as much as a fracture. These injuries can take time to heal completely. If your symptoms don t improve or they get worse, talk with your healthcare provider. You may need a repeat X-ray or other tests.  Call 911  Call for emergency care if any of the following occur:    Trouble breathing    Confused    Very drowsy or trouble awakening    Fainting or loss of consciousness    Rapid or very slow heart rate    Loss of bowel or bladder control  When to seek medical advice  Call your healthcare provider right away if any of the following occur:    Pain gets worse or spreads to your arms or legs    Weakness or numbness in one or both arms or legs    Numbness in the groin or  genital area  Date Last Reviewed: 6/1/2016 2000-2017 [employer]. [address] [place] [address]. All rights reserved. This information is not intended as a substitute for professional medical care. Always follow your healthcare professional's instructions.          Lumbar Rotation    6. Lie on your back on the floor, with your knees bent and your feet flat on the floor. Don t press your neck or lower back to the floor.  7. Lean both of your knees to one side. Turn your head in the opposite direction. Keep your shoulders flat on the floor. Be gentle and don t push through pain.  8. Hold for 20 seconds. Then slowly move your knees and head in the other direction.  9. Repeat 2 to 5 times, or as instructed.   Date Last Reviewed: 3/10/2016    0290-0793 [employer]. [address] [place] [address]. All rights reserved. This information is not intended as a substitute for professional medical care. Always follow your healthcare professional's instructions.          Understanding Lumbosacral Strain    Lumbosacral strain is a medical term for an injury that causes low back pain. The lumbosacral area (low back) is between the bottom of the ribcage and the top of the buttocks. A strain is tearing of muscles and tendons. These tears can be very small but still cause pain.  How a lumbosacral strain happens  Muscles and tendons connected to the spine can be strained in a number of ways:    Sitting or standing in the same position for long periods of time. This can harm the low back over time. Poor posture can make low back pain more likely.    Moving the muscles and tendons past their usual range of motion. This can cause a sudden injury. This can happen when you twist, bend over, or lift something heavy. Not using correct technique for sports or tasks like lifting can make back injury more likely.    Accidents or falls  Lumbosacral strain can be caused by other problems, but  these are less common.  Symptoms of lumbosacral strain  Symptoms may include:    Pain in the back, often on one side    Pain that gets worse with movement and gets better with rest    Inability to move as freely as usual    Swelling, slight redness, and skin warmth in the painful area  Treatment for lumbosacral strain  Low back pain often goes away by itself within several weeks. But it often comes back. Treatment focuses on reducing pain and avoiding further injury. Bed rest is usually not recommended for low back pain. Treatments may include:    Avoiding or changing the action that caused the problem. This helps prevent injuring the tissues again.    Prescription or over-the-counter pain medicines. These help reduce inflammation, swelling, and pain.    Cold or heat packs. These help reduce pain and swelling.    Stretching and other exercises. These improve flexibility and strength.    Physical therapy. This usually includes exercises and other treatments.    Injections of medicine. This may relieve symptoms.  If these treatments do not relieve symptoms, your healthcare provider may order imaging tests to learn more about the problem. Sometimes you may need surgery.  Possible complications of lumbosacral strain  If the cause of the pain is not addressed, symptoms may return or get worse. Follow your healthcare provider s instructions on lifestyle changes and treating your back.     When to call your healthcare provider  Call your healthcare provider right away if you have any of these:    Fever of 100.4 F (38 C) or higher, or as directed    Numbness, tingling, or weakness    Problems with bowel or bladder control, or problems having sex    Pain that does not go away, or gets worse    New symptoms   Date Last Reviewed: 3/10/2016    0459-8170 [employer]. [address] [place] [address], [place] [address]. All rights reserved. This information is not intended as a substitute for professional medical care. Always  follow your healthcare professional's instructions.          Lumbar Extension (Flexibility)    10. Lie face down on your stomach, forehead on the floor. You can lie on a mat or towel.  11. Bend your arms next to your body and lift your upper body up on your forearms. Your palms and forearms should be flat on the floor. Keep your stomach and hips on the floor.  12. Hold your upper body up with your forearms for 20 seconds. Slowly lower back down to the floor.  13. Repeat 2 times, or as instructed.  Date Last Reviewed: 3/10/2016    7656-2762 [employer]. [address] [place] [address]. All rights reserved. This information is not intended as a substitute for professional medical care. Always follow your healthcare professional's instructions.          Future Appointments        Provider Department Dept Phone Center    8/14/2017 8:00 AM CL [facility] [phone] [facility]      24 Hour Appointment Hotline       To make an appointment at any [facility], call [phone] ([phone]). If you don't have a family doctor or clinic, we will help you find one. [facility] are conveniently located to serve the needs of you and your family.          ED Discharge Orders     PHYSICAL THERAPY REFERRAL       *This therapy referral will be filtered to a centralized scheduling office at [facility] and the patient will receive a call to schedule an appointment at a [facility] location most convenient for them. *     [facility] provides Physical Therapy evaluation and treatment and many specialty services across the [facility] system.  If requesting a specialty program, please choose from the list below.    If you have not heard from the scheduling office within 2 business days, please call [phone] for all locations, with the exception of [place], please call [phone].  Treatment: Evaluation & Treatment  Special  "Instructions/Modalities: none  Special Programs:     Please be aware that coverage of these services is subject to the terms and limitations of your health insurance plan.  Call member services at your health plan with any benefit or coverage questions.      **Note to Provider:  If you are referring outside of [facility] for the therapy appointment, please list the name of the location in the \"special instructions\" above, print the referral and give to the patient to schedule the appointment.            PHYSICAL THERAPY TREATMENT                    Review of your medicines      START taking        Dose / Directions Last dose taken    cyclobenzaprine 10 MG tablet   Commonly known as:  FLEXERIL   Dose:  10 mg   Quantity:  14 tablet        Take 1 tablet (10 mg) by mouth nightly as needed for muscle spasms   Refills:  1        ibuprofen 800 MG tablet   Commonly known as:  ADVIL/MOTRIN   Dose:  800 mg   Quantity:  60 tablet        Take 1 tablet (800 mg) by mouth every 8 hours as needed for moderate pain   Refills:  0        oxyCODONE-acetaminophen 5-325 MG per tablet   Commonly known as:  PERCOCET   Dose:  1 tablet   Quantity:  18 tablet        Take 1 tablet by mouth every 4 hours as needed for pain maximum 8 tablet(s) per day   Refills:  0          Our records show that you are taking the medicines listed below. If these are incorrect, please call your family doctor or clinic.        Dose / Directions Last dose taken    buPROPion 150 MG 24 hr tablet   Commonly known as:  WELLBUTRIN XL   Dose:  150 mg   Quantity:  90 tablet        Take 1 tablet (150 mg) by mouth every morning   Refills:  3                Prescriptions were sent or printed at these locations (3 Prescriptions)                   [facility] [place], [place] - [address] [facility] [facility]   [address] [facility] [address]    Telephone:  [phone]   Fax:  [phone]   Hours:                  E-Prescribed (2 of 3)         ibuprofen (ADVIL/MOTRIN) 800 " MG tablet               cyclobenzaprine (FLEXERIL) 10 MG tablet                 Printed at Department/Unit printer (1 of 3)         oxyCODONE-acetaminophen (PERCOCET) 5-325 MG per tablet                Orders Needing Specimen Collection     None      Pending Results     No orders found from 8/11/2017 to 8/14/2017.            Pending Culture Results     No orders found from 8/11/2017 to 8/14/2017.            Pending Results Instructions     If you had any lab results that were not finalized at the time of your Discharge, you can call the ED Lab Result RN at [phone]. You will be contacted by this team for any positive Lab results or changes in treatment. The nurses are available 7 days a week from 10A to 6:30P.  You can leave a message 24 hours per day and they will return your call.        Test Results From Your Hospital Stay               Thank you for choosing [facility]       Thank you for choosing [facility] for your care. Our goal is always to provide you with excellent care. Hearing back from our patients is one way we can continue to improve our services. Please take a few minutes to complete the written survey that you may receive in the mail after you visit with us. Thank you!        [employer] Information     [employer] gives you secure access to your electronic health record. If you see a primary care provider, you can also send messages to your care team and make appointments. If you have questions, please call your primary care clinic.  If you do not have a primary care provider, please call [phone] and they will assist you.        Care EveryWhere ID     This is your Care EveryWhere ID. This could be used by other organizations to access your [facility] medical records  [id number]        Equal Access to Services     [facility] AMANDA : Soto Tovar, waoly garcia, qaybta kaalhellen jimenez, jenny colin. [facility] [phone].    ATENCIÓN: cori Palm  a navarro disposición servicios gratuitos de asistencia lingüística. [place] al [phone].    We comply with applicable federal civil rights laws and [place] laws. We do not discriminate on the basis of race, color, national origin, age, disability sex, sexual orientation or gender identity.            After Visit Summary       This is your record. Keep this with you and show to your community pharmacist(s) and doctor(s) at your next visit.

## 2017-08-23 ENCOUNTER — ALLIED HEALTH/NURSE VISIT (OUTPATIENT)
Dept: FAMILY MEDICINE | Facility: CLINIC | Age: 47
End: 2017-08-23
Payer: MEDICAID

## 2017-08-23 DIAGNOSIS — Z23 ENCOUNTER FOR IMMUNIZATION: Primary | ICD-10-CM

## 2017-08-23 PROCEDURE — 86580 TB INTRADERMAL TEST: CPT

## 2017-08-23 PROCEDURE — 99207 ZZC NO CHARGE NURSE ONLY: CPT

## 2017-08-23 NOTE — NURSING NOTE
The patient is asked the following questions today and these are her answers:    -Have you had a mantoux administered in the past 30 days?    No  -Have you had a previous positive Mantoux.  No  -Have you received BCG in the past.  Yes  -Have you had a live vaccine  (MMR, Varicella, OPV, Yellow Fever) in the last 6 weeks.  No  -Have you had and active  viral or bacterial infection in the past 6 weeks.  No  -Have you received corticosteroids or immunosuppressive agents in the past 6 weeks.  No  -Have you been diagnosed with HIV?  No  -Do you have a maglinancy?  No   Karla Dinh CMA

## 2017-08-23 NOTE — MR AVS SNAPSHOT
After Visit Summary   8/23/2017    Consuelo Medellin    MRN: [id number]           Patient Information     Date Of Birth          1970        Visit Information        Provider Department      8/23/2017 11:30 AM Cma/Lpn, Fl Cl [facility]        Today's Diagnoses     Encounter for immunization    -  1       Follow-ups after your visit        Your next 10 appointments already scheduled     Aug 25, 2017  9:30 AM CDT   Ortho Eval with Eun Michael, PT   [facility]    [address] [facility] [address]   [phone]            Aug 25, 2017  3:15 PM CDT   SHORT with FL CL RN   [facility]    [address] [facility] [address]   [phone]            Sep 06, 2017 11:30 AM CDT   SHORT with Fl Cl Cma/Lpn   [facility] ([facility]    [address] [facility] [address]   [phone]            Sep 08, 2017  3:15 PM CDT   SHORT with [place] CL RN   [facility] ([facility]    [address] [facility] [address]   [phone]              Who to contact     If you have questions or need follow up information about today's clinic visit or your schedule please contact [facility] directly at [phone].  Normal or non-critical lab and imaging results will be communicated to you by MyChart, letter or phone within 4 business days after the clinic has received the results. If you do not hear from us within 7 days, please contact the clinic through MyChart or phone. If you have a critical or abnormal lab result, we will notify you by phone as soon as possible.  Submit refill requests through [employer] or call your pharmacy and they will forward the refill request to us. Please allow 3 business days for your refill to be completed.          Additional Information  About Your Visit        [employer] Information     [employer] gives you secure access to your electronic health record. If you see a primary care provider, you can also send messages to your care team and make appointments. If you have questions, please call your primary care clinic.  If you do not have a primary care provider, please call [phone] and they will assist you.        Care EveryWhere ID     This is your Care EveryWhere ID. This could be used by other organizations to access your [facility] medical records  [id number]         Blood Pressure from Last 3 Encounters:   08/13/17 (!) 155/99   04/03/17 130/89   03/29/17 126/87    Weight from Last 3 Encounters:   08/13/17 213 lb (96.6 kg)   04/03/17 208 lb 6.4 oz (94.5 kg)   03/29/17 209 lb 6.4 oz (95 kg)              We Performed the Following     TB INTRADERMAL TEST        Primary Care Provider Office Phone # Fax #    [facility]ano Shelly Quinonez, CLEOPATRA [phone] [address] [facility] [address]        Equal Access to Services     IKER PATEL : Hadii aad ku hadasho Soomaali, waaxda luqadaha, qaybta kaalmada adeegyaandres, jenny haji . So [facility] [phone].    ATENCIÓN: Si habla español, tiene a navarro disposición servicios gratuitos de asistencia lingüística. [facility] [phone].    We comply with applicable federal civil rights laws and [place] laws. We do not discriminate on the basis of race, color, national origin, age, disability sex, sexual orientation or gender identity.            Thank you!     Thank you for choosing [facility]  for your care. Our goal is always to provide you with excellent care. Hearing back from our patients is one way we can continue to improve our services. Please take a few minutes to complete the written survey that you may receive in the mail after your visit with us. Thank you!             Your Updated Medication List - Protect others around you: Learn how to safely  use, store and throw away your medicines at www.disposemymeds.org.          This list is accurate as of: 8/23/17 11:59 AM.  Always use your most recent med list.                   Brand Name Dispense Instructions for use Diagnosis    buPROPion 150 MG 24 hr tablet    WELLBUTRIN XL    90 tablet    Take 1 tablet (150 mg) by mouth every morning    Seasonal affective disorder (H), Major depressive disorder, recurrent episode, mild (H), Attention deficit disorder of adult, Need for prophylactic vaccination and inoculation against influenza       cyclobenzaprine 10 MG tablet    FLEXERIL    14 tablet    Take 1 tablet (10 mg) by mouth nightly as needed for muscle spasms        oxyCODONE-acetaminophen 5-325 MG per tablet    PERCOCET    18 tablet    Take 1 tablet by mouth every 4 hours as needed for pain maximum 8 tablet(s) per day

## 2017-08-23 NOTE — LETTER
[facility]  [address]  [facility] [address]  Phone: [phone]      8/25/2017     Consuelo Medellin  [address] [address] [place] [address]      Dear Consuelo:    Thank you for allowing me to participate in your care. Your recent test results were reviewed and listed below.  TB test was negative.     Your results are provided below for your review  Results for orders placed or performed in visit on 08/23/17   TB INTRADERMAL TEST   Result Value Ref [place]    PPD Induration 0 0 - 5 mm    PPD Redness 0 mm                 Thank you for choosing [facility]. As a result, please continue with the treatment plan discussed in the office. Return as discussed or sooner if symptoms worsen or fail to improve. If you have any further questions or concerns, please do not hesitate to contact us.      Sincerely,        Dr. Michael Pitts

## 2017-08-25 ENCOUNTER — HOSPITAL ENCOUNTER (OUTPATIENT)
Dept: PHYSICAL THERAPY | Facility: CLINIC | Age: 47
Setting detail: THERAPIES SERIES
End: 2017-08-25
Attending: NURSE PRACTITIONER
Payer: MEDICAID

## 2017-08-25 ENCOUNTER — OFFICE VISIT (OUTPATIENT)
Dept: FAMILY MEDICINE | Facility: CLINIC | Age: 47
End: 2017-08-25

## 2017-08-25 DIAGNOSIS — Z11.1 VISIT FOR MANTOUX TEST: Primary | ICD-10-CM

## 2017-08-25 LAB
PPDINDURATION: 0 MM (ref 0–5)
PPDREDNESS: 0 MM

## 2017-08-25 PROCEDURE — 40000718 ZZHC STATISTIC PT DEPARTMENT ORTHO VISIT: Performed by: PHYSICAL THERAPIST

## 2017-08-25 PROCEDURE — 97161 PT EVAL LOW COMPLEX 20 MIN: CPT | Mod: GP | Performed by: PHYSICAL THERAPIST

## 2017-08-25 PROCEDURE — 97110 THERAPEUTIC EXERCISES: CPT | Mod: GP | Performed by: PHYSICAL THERAPIST

## 2017-08-25 PROCEDURE — 99207 ZZC NO CHARGE NURSE ONLY: CPT

## 2017-08-25 NOTE — PROGRESS NOTES
Mantoux read.  Induration 0.  Redness 0.  Negative TB test.  Letter with results given to pt.  Nayla

## 2017-08-25 NOTE — LETTER
[facility]  [address] [place] [address]  [address]  Phone: [phone]  Fax: [phone]                8/25/17                To whom it may concern:                Consuelo Lacie tested negative for her Mantoux test.   Redness 0.   Induration 0.            KPavelRN

## 2017-08-25 NOTE — MR AVS SNAPSHOT
After Visit Summary   8/25/2017    Consuelo Medellin    MRN: [id number]           Patient Information     Date Of Birth          1970        Visit Information        Provider Department      8/25/2017 3:15 PM FL CL RN [facility]        Today's Diagnoses     Visit for Mantoux test    -  1       Follow-ups after your visit        Your next 10 appointments already scheduled     Sep 01, 2017 11:00 AM CDT   Ortho Treatment with Eun Michael, ANICETO   [facility])    [address] [address]   [phone]            Sep 06, 2017 11:30 AM CDT   [facility] with [facility] Cl [facility]    [address] [address]   [phone]            Sep 08, 2017  3:15 PM CDT   SHORT with FL CL RN   [facility]    [address] [address]   [phone]              Who to contact     If you have questions or need follow up information about today's clinic visit or your schedule please contact [facility] directly at [phone].  Normal or non-critical lab and imaging results will be communicated to you by Bizohart, letter or phone within 4 business days after the clinic has received the results. If you do not hear from us within 7 days, please contact the clinic through MyChart or phone. If you have a critical or abnormal lab result, we will notify you by phone as soon as possible.  Submit refill requests through [employer] or call your pharmacy and they will forward the refill request to us. Please allow 3 business days for your refill to be completed.          Additional Information About Your Visit        Bizohart Information     [employer] gives you secure access to your electronic health record. If you see a primary care provider, you can also send messages to your care  team and make appointments. If you have questions, please call your primary care clinic.  If you do not have a primary care provider, please call [phone] and they will assist you.        Care EveryWhere ID     This is your Care EveryWhere ID. This could be used by other organizations to access your [place] medical records  [id number]         Blood Pressure from Last 3 Encounters:   08/13/17 (!) 155/99   04/03/17 130/89   03/29/17 126/87    Weight from Last 3 Encounters:   08/13/17 213 lb (96.6 kg)   04/03/17 208 lb 6.4 oz (94.5 kg)   03/29/17 209 lb 6.4 oz (95 kg)              Today, you had the following     No orders found for display       Primary Care Provider Office Phone # Fax #    Nano Mendozasharona Quinonez [phone] [address] [facility] [address] [facility] [address]        Equal Access to Services     BREEZY PATEL : Hadii aad ku hadasho Soomaali, waaxda luqadaha, qaybta kaalmada adeegyada, waxay mynorin haysherrie gandhi khelsie haji . So [facility] [phone].    ATENCIÓN: Si habla español, tiene a navarro disposición servicios gratuitos de asistencia lingüística. Llame al [phone].    We comply with applicable federal civil rights laws and [place] laws. We do not discriminate on the basis of race, color, national origin, age, disability sex, sexual orientation or gender identity.            Thank you!     Thank you for choosing [facility]  for your care. Our goal is always to provide you with excellent care. Hearing back from our patients is one way we can continue to improve our services. Please take a few minutes to complete the written survey that you may receive in the mail after your visit with us. Thank you!             Your Updated Medication List - Protect others around you: Learn how to safely use, store and throw away your medicines at www.disposemymeds.org.          This list is accurate as of: 8/25/17  3:40 PM.  Always use your most recent med list.                    Brand Name Dispense Instructions for use Diagnosis    buPROPion 150 MG 24 hr tablet    WELLBUTRIN XL    90 tablet    Take 1 tablet (150 mg) by mouth every morning    Seasonal affective disorder (H), Major depressive disorder, recurrent episode, mild (H), Attention deficit disorder of adult, Need for prophylactic vaccination and inoculation against influenza       cyclobenzaprine 10 MG tablet    FLEXERIL    14 tablet    Take 1 tablet (10 mg) by mouth nightly as needed for muscle spasms        oxyCODONE-acetaminophen 5-325 MG per tablet    PERCOCET    18 tablet    Take 1 tablet by mouth every 4 hours as needed for pain maximum 8 tablet(s) per day

## 2017-08-25 NOTE — PROGRESS NOTES
"Consuelo Medellin  1970   08/25/17 0900   General Information   Type of Visit Initial OP Ortho PT Evaluation   Start of Care Date 08/25/17   Referring Physician Maryse Cui    Orders Evaluate and Treat   Date of Order 08/13/17   Insurance Type (no insurance)   Medical Diagnosis acute muscle strain   Precautions/Limitations no known precautions/limitations   Body Part(s)   Body Part(s) Lumbar Spine/SI   Presentation and Etiology   Pertinent history of current problem (include personal factors and/or comorbidities that impact the POC) 48 yo female with c/o R hip and ant thigh pain (she says it is NOT my back) for ~ 3 weeks.  Aug 3 felt immediate pain in R LB when bending down to retrieve clothing from floor, that same day flew to [place] and embarked on a ~10 day tour up to [place] with 4-6 hrs driving in van and 4-6+ hrs walking tour. Tried acupunture while on trip and execises for sciatica all with inconsistent relief. Symptoms changed from just hip (iliac crest) to ant thigh to knee to just hip again.  Went to ED directly from airport Aug 13 due to severe pain.  Feels better when moving around. Has only been able to identify one position that consistently decreases (not abolish) pain (kneeling on floor resting stomach & chest on chair seat. is better than previously with no \"back\" pain only hip (iliac crest) and ant thigh (L2 distribution). PMH: Depression, 4 kids ages 10-4 yrs; thyroid removal, hernia surgeries Jan 2017, 2011   Impairments A. Pain;J. Burning;L. Tingling   Functional Limitations perform activities of daily living;perform desired leisure / sports activities   Symptom Location R iliac crest area; R ant lateral thigh (L2 distribution)   How/Where did it occur At home;Other  (bending over reaching)   Onset date of current episode/exacerbation 08/03/17   Chronicity New   Pain rating (0-10 point scale) Best (/10);Worst (/10)   Best (/10) 5   Worst (/10) 9   Pain quality A. Sharp;C. Aching;D. Burning "   Frequency of pain/symptoms A. Constant   Pain/symptoms exacerbated by A. Sitting;G. Certain positions   Pain/symptoms eased by A. Sitting;B. Walking;E. Changing positions;F. Certain positions;G. Heat;H. Cold   Prior Level of Function   Functional Level Prior Comment Independent   Current Level of Function   Current Community Support Family/friend caregiver   Patient role/employment history C. 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 No   Is patient a fall risk? No   System Outcome Measures   Outcome Measures Low Back Pain (see Oswestry and Abdiel)   Lumbar Spine/SI Objective Findings   Observation NAD; moves with ease; reports some difficulty but not enough to restrict mobility   Integumentary unremarkable   Posture wfl; slight rounded shoulders and forward head   Gait/Locomotion wnl   Balance/Proprioception (Single Leg Stance) 15 sec   Flexion ROM increases pain   Extension ROM increases pain   Right Side Bending ROM wnl no increase in pain   Left Side Bending ROM wnl increases pain   Repeated Extension-Standing ROM increases more than flxn   Repeated Flexion-Standing ROM increases pain   Lumbar ROM Comment appears minimally limited   Pelvic Screen -   Hip Screen -   Hip Flexion (L2) Strength R 4+   Hip Abduction Strength R 4+   Knee Extension (L3) Strength 5   Ankle Dorsiflexion (L4) Strength 5   Great Toe Extension (L5) Strength 5   Ankle Plantar Flexion (S1) Strength 5   Hamstring Flexibility excellent   SLR -   Slump Test +   Patellar Tendon Reflexes  decreased R   Achilles Tendon Reflexes decreased R   Planned Therapy Interventions   Planned Therapy Interventions manual therapy;neuromuscular re-education;ROM;strengthening;stretching   Clinical Impression   Criteria for Skilled Therapeutic Interventions Met yes, treatment indicated   PT Diagnosis R lumbar radiculopathy   Influenced by  the following impairments pain in R LE; R LE weakness   Functional limitations due to impairments difficulty with ADLS   Clinical Presentation Stable/Uncomplicated   Clinical Presentation Rationale eval findings   Clinical Decision Making (Complexity) Low complexity   Therapy Frequency 1 time/week  (progressing to 1x every 2 weeks )   Predicted Duration of Therapy Intervention (days/wks) 8 weeks total; likely 4 visits total   Risk & Benefits of therapy have been explained Yes   Patient, Family & other staff in agreement with plan of care Yes   Clinical Impression Comments pt will benefit from skilled intervention to decrease pain, teach strengthening exercises and improve functional mobility in addition to strategies to avoid future episodes   Education Assessment   Preferred Learning Style Listening;Reading;Demonstration;Pictures/video   Barriers to Learning No barriers   ORTHO GOALS   PT Ortho Eval Goals 1;2;3   Ortho Goal 1   Goal Description abolish back and radicular pain in order to resume previous lifestyle   Target Date 10/25/17   Ortho Goal 2   Goal Description Independent in HEP   Target Date 10/25/17   Ortho Goal 3   Goal Description resume previous activities:  house keeping, meal prep; ; community activities   Target Date 10/25/17   Total Evaluation Time   Total Evaluation Time 15

## 2017-08-30 NOTE — ADDENDUM NOTE
Encounter addended by: Eun Michael, PT on: 8/30/2017  2:54 PM<BR>     Actions taken: Pend clinical note, Sign clinical note

## 2017-08-30 NOTE — ADDENDUM NOTE
Encounter addended by: Eun Michael, PT on: 8/30/2017  2:55 PM<BR>     Actions taken: Document created

## 2017-09-01 ENCOUNTER — HOSPITAL ENCOUNTER (OUTPATIENT)
Dept: PHYSICAL THERAPY | Facility: CLINIC | Age: 47
Setting detail: THERAPIES SERIES
End: 2017-09-01
Attending: NURSE PRACTITIONER
Payer: COMMERCIAL

## 2017-09-01 PROCEDURE — 97110 THERAPEUTIC EXERCISES: CPT | Mod: GP | Performed by: PHYSICAL THERAPIST

## 2017-09-01 PROCEDURE — 40000718 ZZHC STATISTIC PT DEPARTMENT ORTHO VISIT: Performed by: PHYSICAL THERAPIST

## 2017-09-01 PROCEDURE — 97012 MECHANICAL TRACTION THERAPY: CPT | Mod: GP | Performed by: PHYSICAL THERAPIST

## 2017-09-05 ENCOUNTER — HOSPITAL ENCOUNTER (OUTPATIENT)
Dept: PHYSICAL THERAPY | Facility: CLINIC | Age: 47
Setting detail: THERAPIES SERIES
End: 2017-09-05
Attending: NURSE PRACTITIONER
Payer: COMMERCIAL

## 2017-09-05 PROCEDURE — 97110 THERAPEUTIC EXERCISES: CPT | Mod: GP | Performed by: PHYSICAL THERAPIST

## 2017-09-05 PROCEDURE — 97535 SELF CARE MNGMENT TRAINING: CPT | Mod: GP | Performed by: PHYSICAL THERAPIST

## 2017-09-05 PROCEDURE — 40000718 ZZHC STATISTIC PT DEPARTMENT ORTHO VISIT: Performed by: PHYSICAL THERAPIST

## 2017-09-06 ENCOUNTER — OFFICE VISIT (OUTPATIENT)
Dept: FAMILY MEDICINE | Facility: CLINIC | Age: 47
End: 2017-09-06
Payer: COMMERCIAL

## 2017-09-06 DIAGNOSIS — F33.8 SEASONAL AFFECTIVE DISORDER (H): ICD-10-CM

## 2017-09-06 DIAGNOSIS — F98.8 ATTENTION DEFICIT DISORDER OF ADULT: ICD-10-CM

## 2017-09-06 DIAGNOSIS — Z23 NEED FOR PROPHYLACTIC VACCINATION AND INOCULATION AGAINST INFLUENZA: ICD-10-CM

## 2017-09-06 DIAGNOSIS — Z23 ENCOUNTER FOR IMMUNIZATION: Primary | ICD-10-CM

## 2017-09-06 DIAGNOSIS — F33.0 MAJOR DEPRESSIVE DISORDER, RECURRENT EPISODE, MILD (H): ICD-10-CM

## 2017-09-06 PROCEDURE — 86580 TB INTRADERMAL TEST: CPT

## 2017-09-06 PROCEDURE — 99207 ZZC NO CHARGE NURSE ONLY: CPT

## 2017-09-06 NOTE — TELEPHONE ENCOUNTER
Bupropion       Last Written Prescription Date: 10/24/2016  Last Fill Quantity: 90; # refills: 3  Last Office Visit with [facility], [facility] or  Health prescribing provider:  04/03/2017   Next 5 appointments (look out 90 days)     Sep 08, 2017  3:15 PM CDT   SHORT with DANNIELLE MELISSA RN   [facility]    [address] [address]   [phone]                   Last PHQ-9 score on record=   PHQ-9 SCORE 4/3/2017   Total Score -   Total Score 0       Lab Results   Component Value Date    AST 11 02/26/2017     Lab Results   Component Value Date    ALT 21 02/26/2017

## 2017-09-06 NOTE — MR AVS SNAPSHOT
After Visit Summary   9/6/2017    Consuelo Medellin    MRN: [id number]           Patient Information     Date Of Birth          1970        Visit Information        Provider Department      9/6/2017 11:30 AM Cma/Lpn, Fl Cl [facility]        Today's Diagnoses     Encounter for immunization    -  1       Follow-ups after your visit        Your next 10 appointments already scheduled     Sep 08, 2017  3:15 PM CDT   SHORT with DANNIELLE MELISSA RN   [facility] ([facility])    [address] [address]   [phone]            Sep 13, 2017 10:30 AM CDT   Ortho Treatment with Eun Michael PT   [facility] ([facility])    [address] [address]   [phone]            Sep 20, 2017 10:30 AM CDT   Ortho Treatment with Eun Michael PT   [facility] ([facility])    [address] [address]   [phone]              Who to contact     If you have questions or need follow up information about today's clinic visit or your schedule please contact [facility] directly at [phone].  Normal or non-critical lab and imaging results will be communicated to you by TRIA Beautyhart, letter or phone within 4 business days after the clinic has received the results. If you do not hear from us within 7 days, please contact the clinic through MyChart or phone. If you have a critical or abnormal lab result, we will notify you by phone as soon as possible.  Submit refill requests through Where's Up or call your pharmacy and they will forward the refill request to us. Please allow 3 business days for your refill to be completed.          Additional Information About Your Visit        TRIA Beautyhart Information     Where's Up gives you secure access to your electronic health record. If you see a primary care provider, you can  also send messages to your care team and make appointments. If you have questions, please call your primary care clinic.  If you do not have a primary care provider, please call [phone] and they will assist you.        Care EveryWhere ID     This is your Care EveryWhere ID. This could be used by other organizations to access your [place] medical records  [id number]         Blood Pressure from Last 3 Encounters:   08/13/17 (!) 155/99   04/03/17 130/89   03/29/17 126/87    Weight from Last 3 Encounters:   08/13/17 213 lb (96.6 kg)   04/03/17 208 lb 6.4 oz (94.5 kg)   03/29/17 209 lb 6.4 oz (95 kg)              We Performed the Following     TB INTRADERMAL TEST        Primary Care Provider Office Phone # Fax #    Nano Mendozasharona Quinonez [phone] [address] [address]        Equal Access to Services     BREEZY PATEL : Hadii aad ku hadasho Soomaali, waaxda luqadaha, qaybta kaalmada adeegyada, waxay idiin hayminnan hiram haji . So [facility] [phone].    ATENCIÓN: Si habla español, tiene a navarro disposición servicios gratuitos de asistencia lingüística. Llame al [phone].    We comply with applicable federal civil rights laws and [place] laws. We do not discriminate on the basis of race, color, national origin, age, disability sex, sexual orientation or gender identity.            Thank you!     Thank you for choosing [facility]  for your care. Our goal is always to provide you with excellent care. Hearing back from our patients is one way we can continue to improve our services. Please take a few minutes to complete the written survey that you may receive in the mail after your visit with us. Thank you!             Your Updated Medication List - Protect others around you: Learn how to safely use, store and throw away your medicines at www.disposemymeds.org.          This list is accurate as of: 9/6/17 11:50 AM.  Always use your most recent med  list.                   Brand Name Dispense Instructions for use Diagnosis    buPROPion 150 MG 24 hr tablet    WELLBUTRIN XL    90 tablet    Take 1 tablet (150 mg) by mouth every morning    Seasonal affective disorder (H), Major depressive disorder, recurrent episode, mild (H), Attention deficit disorder of adult, Need for prophylactic vaccination and inoculation against influenza       cyclobenzaprine 10 MG tablet    FLEXERIL    14 tablet    Take 1 tablet (10 mg) by mouth nightly as needed for muscle spasms        oxyCODONE-acetaminophen 5-325 MG per tablet    PERCOCET    18 tablet    Take 1 tablet by mouth every 4 hours as needed for pain maximum 8 tablet(s) per day

## 2017-09-06 NOTE — NURSING NOTE
The patient is asked the following questions today and these are her answers:    -Have you had a mantoux administered in the past 30 days?    Yes  -Have you had a previous positive Mantoux.  No  -Have you received BCG in the past.  No  -Have you had a live vaccine  (MMR, Varicella, OPV, Yellow Fever) in the last 6 weeks.  No  -Have you had and active  viral or bacterial infection in the past 6 weeks.  No  -Have you received corticosteroids or immunosuppressive agents in the past 6 weeks.  No  -Have you been diagnosed with HIV?  No  -Do you have a maglinancy?  No   Karla Dinh CMA

## 2017-09-07 RX ORDER — BUPROPION HYDROCHLORIDE 150 MG/1
150 TABLET ORAL EVERY MORNING
Qty: 90 TABLET | Refills: 1 | Status: SHIPPED | OUTPATIENT
Start: 2017-09-07 | End: 2018-04-23

## 2017-09-13 ENCOUNTER — TELEPHONE (OUTPATIENT)
Dept: FAMILY MEDICINE | Facility: CLINIC | Age: 47
End: 2017-09-13

## 2017-09-13 ENCOUNTER — HOSPITAL ENCOUNTER (OUTPATIENT)
Dept: PHYSICAL THERAPY | Facility: CLINIC | Age: 47
Setting detail: THERAPIES SERIES
End: 2017-09-13
Attending: NURSE PRACTITIONER
Payer: COMMERCIAL

## 2017-09-13 ENCOUNTER — ALLIED HEALTH/NURSE VISIT (OUTPATIENT)
Dept: FAMILY MEDICINE | Facility: CLINIC | Age: 47
End: 2017-09-13
Payer: COMMERCIAL

## 2017-09-13 DIAGNOSIS — R76.11 MANTOUX: POSITIVE: Primary | ICD-10-CM

## 2017-09-13 LAB
PPDINDURATION: NORMAL MM (ref 0–5)
PPDREDNESS: NORMAL MM

## 2017-09-13 PROCEDURE — 97110 THERAPEUTIC EXERCISES: CPT | Mod: GP | Performed by: PHYSICAL THERAPIST

## 2017-09-13 PROCEDURE — 99207 ZZC NO CHARGE NURSE ONLY: CPT

## 2017-09-13 PROCEDURE — 40000718 ZZHC STATISTIC PT DEPARTMENT ORTHO VISIT: Performed by: PHYSICAL THERAPIST

## 2017-09-13 NOTE — TELEPHONE ENCOUNTER
Pt received 2 step mantoux test in order to volunteer for Hosp[facility].  8/23/17 mantoux given with negative result.  9/6/17 2nd mantoux given pt missed reading on 9/8/17.  Pt in clinic on 9/13/17 (5 days late) and mantoux is positive--- red with 11 mm induration.  Spoke with [facility] which recommended calling the State TB Control Office #[phone] for recommendations.  They said this would be a positive TB reading and recommend a CXR and a face to face visit with PCP.  CXR order placed, pt notified and pt scheduled appt on 9/15/17 with CLEOPATRA Lay since CLEOPATRA Starks is out of office x 1 week.  KpavelMARIA E

## 2017-09-13 NOTE — NURSING NOTE
Mantoux not done, she forgot to come in on Friday to get it read, and when I looked at the arm where it was done there was a red spot and it was raised underneath, so I had Conchis the RN come in and look at it, she will check into it and inform us what the next step will be.  Karla Dinh CMA

## 2017-09-13 NOTE — MR AVS SNAPSHOT
After Visit Summary   9/13/2017    Consuelo Medellin    MRN: [id number]           Patient Information     Date Of Birth          1970        Visit Information        Provider Department      9/13/2017 11:15 AM Cma/Lpn, Fl Cl [facility]        Today's Diagnoses     Mantoux: positive    -  1       Follow-ups after your visit        Your next 10 appointments already scheduled     Sep 14, 2017  9:00 AM CDT   XR CHEST 2 VIEWS with CLXR1   [facility]    [address] [address]   [phone]           Please bring a list of your current medicines to your exam. (Include vitamins, minerals and over-thecounter medicines.) Leave your valuables at home.  Tell your doctor if there is a chance you may be pregnant.  You do not need to do anything special for this exam.            Sep 15, 2017 11:20 AM CDT   SHORT with MICHAEL Raza CNP   [facility]    [address] [address]   [phone]            Sep 15, 2017  3:00 PM CDT   Nurse Only with Fl Cl Cma/Lpn   [facility]    [address] [address]   [phone]            Sep 20, 2017 10:30 AM CDT   Ortho Treatment with Eun Michael, ANICETO   [facility] ([facility])    [address] [address]   [phone]              Future tests that were ordered for you today     Open Future Orders        Priority Expected Expires Ordered    XR Chest 2 Views Routine 9/13/2017 9/13/2018 9/13/2017            Who to contact     If you have questions or need follow up information about today's clinic visit or your schedule please contact [facility] directly at [phone].  Normal or non-critical lab and imaging results will be communicated to you by  MyChart, letter or phone within 4 business days after the clinic has received the results. If you do not hear from us within 7 days, please contact the clinic through [employer] Software[employer] or phone. If you have a critical or abnormal lab result, we will notify you by phone as soon as possible.  Submit refill requests through [employer] or call your pharmacy and they will forward the refill request to us. Please allow 3 business days for your refill to be completed.          Additional Information About Your Visit        Qpynhart Information     [employer] gives you secure access to your electronic health record. If you see a primary care provider, you can also send messages to your care team and make appointments. If you have questions, please call your primary care clinic.  If you do not have a primary care provider, please call [phone] and they will assist you.        Care EveryWhere ID     This is your Care EveryWhere ID. This could be used by other organizations to access your [place] medical records  [id number]         Blood Pressure from Last 3 Encounters:   08/13/17 (!) 155/99   04/03/17 130/89   03/29/17 126/87    Weight from Last 3 Encounters:   08/13/17 213 lb (96.6 kg)   04/03/17 208 lb 6.4 oz (94.5 kg)   03/29/17 209 lb 6.4 oz (95 kg)              Today, you had the following     No orders found for display       Primary Care Provider Office Phone # Fax #    Nano Shelly Quinonez [phone] [address] [address]        Equal Access to Services     BREEZY PATEL : Hadii aad ku hadasho Soomaali, waaxda luqadaha, qaybta kaalmada adeegyada, waxay mynorin haysherrie haji . So [facility] [phone].    ATENCIÓN: Si habla español, tiene a navarro disposición servicios gratuitos de asistencia lingüística. Llame al [phone].    We comply with applicable federal civil rights laws and [place] laws. We do not discriminate on the basis of race, color, national origin, age, disability  sex, sexual orientation or gender identity.            Thank you!     Thank you for choosing [facility]  for your care. Our goal is always to provide you with excellent care. Hearing back from our patients is one way we can continue to improve our services. Please take a few minutes to complete the written survey that you may receive in the mail after your visit with us. Thank you!             Your Updated Medication List - Protect others around you: Learn how to safely use, store and throw away your medicines at www.disposemymeds.org.          This list is accurate as of: 9/13/17 12:55 PM.  Always use your most recent med list.                   Brand Name Dispense Instructions for use Diagnosis    buPROPion 150 MG 24 hr tablet    WELLBUTRIN XL    90 tablet    Take 1 tablet (150 mg) by mouth every morning    Seasonal affective disorder (H), Major depressive disorder, recurrent episode, mild (H), Attention deficit disorder of adult, Need for prophylactic vaccination and inoculation against influenza       cyclobenzaprine 10 MG tablet    FLEXERIL    14 tablet    Take 1 tablet (10 mg) by mouth nightly as needed for muscle spasms        oxyCODONE-acetaminophen 5-325 MG per tablet    PERCOCET    18 tablet    Take 1 tablet by mouth every 4 hours as needed for pain maximum 8 tablet(s) per day

## 2017-09-14 ENCOUNTER — RADIANT APPOINTMENT (OUTPATIENT)
Dept: GENERAL RADIOLOGY | Facility: CLINIC | Age: 47
End: 2017-09-14
Attending: NURSE PRACTITIONER
Payer: COMMERCIAL

## 2017-09-14 DIAGNOSIS — R76.11 MANTOUX: POSITIVE: ICD-10-CM

## 2017-09-14 PROCEDURE — 71020 XR CHEST 2 VW: CPT

## 2017-09-14 NOTE — PROGRESS NOTES
Notified via [employer] Microelectronicshart: Positive PPD and negative CXR indicates latent TB if no active symptoms. Please keep your appointment in clinic to discuss treatment plan (there are several treatment options ranging from 3-9 months). Niecy can discuss the pros/cons of the different treatment regimens with you at your visit.

## 2017-09-14 NOTE — PROGRESS NOTES
Chest x-ray is negative for active pulmonary tuberculosis. Follow-up with provider that is already scheduled

## 2017-09-15 ENCOUNTER — OFFICE VISIT (OUTPATIENT)
Dept: FAMILY MEDICINE | Facility: CLINIC | Age: 47
End: 2017-09-15
Payer: COMMERCIAL

## 2017-09-15 VITALS
TEMPERATURE: 98.3 F | RESPIRATION RATE: 16 BRPM | BODY MASS INDEX: 32.96 KG/M2 | DIASTOLIC BLOOD PRESSURE: 89 MMHG | HEART RATE: 83 BPM | HEIGHT: 67 IN | SYSTOLIC BLOOD PRESSURE: 136 MMHG | WEIGHT: 210 LBS

## 2017-09-15 DIAGNOSIS — Z22.7 LATENT TUBERCULOSIS BY SKIN TEST: Primary | ICD-10-CM

## 2017-09-15 PROCEDURE — 99213 OFFICE O/P EST LOW 20 MIN: CPT | Performed by: NURSE PRACTITIONER

## 2017-09-15 RX ORDER — ISONIAZID 300 MG/1
300 TABLET ORAL DAILY
Qty: 90 TABLET | Refills: 1 | Status: SHIPPED | OUTPATIENT
Start: 2017-09-15 | End: 2018-05-02

## 2017-09-15 NOTE — PATIENT INSTRUCTIONS
Will initiate treatment for the next 6 months.  [facility] web site for more information regarding latent TB and treatment.  Follow up if any further concerns.        Thank you for choosing [facility].  You may be receiving a survey in the mail from Bertin Simmons regarding your visit today.  Please take a few minutes to complete and return the survey to let us know how we are doing.      Our Clinic hours are:  Mondays    7:20 am - 7 pm  Tues -  Fri  7:20 am - 5 pm    Clinic Phone: [phone]    The clinic lab opens at 7:30 am Mon - Fri and appointments are required.    [facility]  Ph. [phone]  Monday-Thursday 8 am - 7pm  Tues/Wed/Fri 8 am - 5:30 pm

## 2017-09-15 NOTE — MR AVS SNAPSHOT
After Visit Summary   9/15/2017    Consuelo Medellin    MRN: [id number]           Patient Information     Date Of Birth          1970        Visit Information        Provider Department      9/15/2017 11:20 AM Niecy Peralta APRN CNP [facility]        Today's Diagnoses     Latent tuberculosis by skin test    -  1      Care Instructions    Will initiate treatment for the next 6 months.  [facility] [place] web site for more information regarding latent TB and treatment.  Follow up if any further concerns.        Thank you for choosing [facility].  You may be receiving a survey in the mail from [employer] regarding your visit today.  Please take a few minutes to complete and return the survey to let us know how we are doing.      Our Clinic hours are:  Mondays    7:20 am - 7 pm  Tues -  Fri  7:20 am - 5 pm    Clinic Phone: [phone]    The clinic lab opens at 7:30 am Mon - Fri and appointments are required.    [facility]  Ph. [phone]  Monday-Thursday 8 am - 7pm  Tues/Wed/Fri 8 am - 5:30 pm                 Follow-ups after your visit        Your next 10 appointments already scheduled     Sep 20, 2017 10:30 AM CDT   Ortho Treatment with Eun Michael, ANICETO   [facility] ([facility])    [address] [facility] [id number] [phone]              Who to contact     If you have questions or need follow up information about today's clinic visit or your schedule please contact [facility] directly at [phone].  Normal or non-critical lab and imaging results will be communicated to you by MyChart, letter or phone within 4 business days after the clinic has received the results. If you do not hear from us within 7 days, please contact the clinic through MyChart or phone. If you have a critical or abnormal lab result, we will notify you by phone as soon as possible.  Submit refill requests  "through [employer] or call your pharmacy and they will forward the refill request to us. Please allow 3 business days for your refill to be completed.          Additional Information About Your Visit        [employer] Information     [employer] gives you secure access to your electronic health record. If you see a primary care provider, you can also send messages to your care team and make appointments. If you have questions, please call your primary care clinic.  If you do not have a primary care provider, please call [phone] and they will assist you.        Care EveryWhere ID     This is your Care EveryWhere ID. This could be used by other organizations to access your [place] medical records  [id number]        Your Vitals Were     Pulse Temperature Respirations Height BMI (Body Mass Index)       83 98.3  F (36.8  C) (Oral) 16 5' 7.25\" (1.708 m) 32.65 kg/m2        Blood Pressure from Last 3 Encounters:   09/15/17 136/89   08/13/17 (!) 155/99   04/03/17 130/89    Weight from Last 3 Encounters:   09/15/17 210 lb (95.3 kg)   08/13/17 213 lb (96.6 kg)   04/03/17 208 lb 6.4 oz (94.5 kg)              Today, you had the following     No orders found for display         Today's Medication Changes          These changes are accurate as of: 9/15/17 12:16 PM.  If you have any questions, ask your nurse or doctor.               Start taking these medicines.        Dose/Directions    isoniazid 300 MG tablet   Commonly known as:  NYDRAZID   Used for:  Latent tuberculosis by skin test   Started by:  Niecy Peralta APRN CNP        Dose:  300 mg   Take 1 tablet (300 mg) by mouth daily   Quantity:  90 tablet   Refills:  1            Where to get your medicines      These medications were sent to [facility] [place], [place] - [address]  [address] [facility] [address]     Phone:  [phone]     isoniazid 300 MG tablet                Primary Care Provider Office Phone # Fax # "    Nano Quinonez -[phone]       [address] [facility] [address]        Equal Access to Services     BREEZY PATEL : Hadii aad ku hadsusannahvelma iKrstenjuan, wasrinivasda aubreysergoha, nitzata kamaryda barbara, jenny mynorin hayaan katerynadavid lerma raissa colin. So [facility] [phone].    ATENCIÓN: Si habla español, tiene a navarro disposición servicios gratuitos de asistencia lingüística. Llame al [phone].    We comply with applicable federal civil rights laws and [place] laws. We do not discriminate on the basis of race, color, national origin, age, disability sex, sexual orientation or gender identity.            Thank you!     Thank you for choosing [facility]  for your care. Our goal is always to provide you with excellent care. Hearing back from our patients is one way we can continue to improve our services. Please take a few minutes to complete the written survey that you may receive in the mail after your visit with us. Thank you!             Your Updated Medication List - Protect others around you: Learn how to safely use, store and throw away your medicines at www.disposemymeds.org.          This list is accurate as of: 9/15/17 12:16 PM.  Always use your most recent med list.                   Brand Name Dispense Instructions for use Diagnosis    buPROPion 150 MG 24 hr tablet    WELLBUTRIN XL    90 tablet    Take 1 tablet (150 mg) by mouth every morning    Seasonal affective disorder (H), Major depressive disorder, recurrent episode, mild (H), Attention deficit disorder of adult, Need for prophylactic vaccination and inoculation against influenza       isoniazid 300 MG tablet    NYDRAZID    90 tablet    Take 1 tablet (300 mg) by mouth daily    Latent tuberculosis by skin test       oxyCODONE-acetaminophen 5-325 MG per tablet    PERCOCET    18 tablet    Take 1 tablet by mouth every 4 hours as needed for pain maximum 8 tablet(s) per day

## 2017-09-15 NOTE — PROGRESS NOTES
SUBJECTIVE:   Consuelo Medellin is a 47 year old female who presents to clinic today for the following health issues:  has been out of the Wellbutrin for 3 days.    Pt had a positive mantoux at her reading which she was 5 days late on getting it read 5.  She did have a cold when it was given. This positive mantoux was her 2nd onthe part 2 step mantoux. CXR was negative.        Problem list and histories reviewed & adjusted, as indicated.  Additional history: she is here to discuss recent positive ppd. She has had many TB tests done in the past as she's worked at schools, overseas and at [employer]. She reports never having a positive test.  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 She has no symptoms, no cough, night sweats, weight loss. She feels fine.  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  Her first ppd was negative, second was positive at 11 mm induration. She had a CHEST X RAY  Done which was without evidence of active TB.  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 No other travels or possible exposures easily identified.  Consulted with [place] department of Health which recommends treatment for latent TB infection. They also referred me to CDC for further guidelines, which was helpful.      Patient Active Problem List   Diagnosis     CARDIOVASCULAR SCREENING; LDL GOAL LESS THAN 160     Blood type O+     Inguinal hernia     Obesity     Attention deficit disorder of adult     Major depressive disorder, recurrent episode, mild (H)     Thyroid nodule     Seasonal affective disorder (H)     Past Surgical History:   Procedure Laterality Date     GYN SURGERY      C section     HERNIORRHAPHY INGUINAL  10/26/2011    Procedure:HERNIORRHAPHY INGUINAL; Right Inguinal Herniorrhapy with Mesh Placement; Surgeon:ABBIE SHIELDS; Location:[facility] OR     HERNIORRHAPHY VENTRAL N/A 1/4/2017    Procedure: HERNIORRHAPHY  VENTRAL;  Surgeon: Stevie Bangura MD;  Location: [facility] OR     PE TUBES       THYROIDECTOMY Left 3/8/2016    Procedure: THYROIDECTOMY;  Surgeon: Anitra Ornelas MD;  Location:  OR       Social History   Substance Use Topics     Smoking status: Never Smoker     Smokeless tobacco: Never Used     Alcohol use Yes      Comment: rare     Family History   Problem Relation Age of Onset     HEART DISEASE Mother      arrythmia     DIABETES Father      C.A.D. Father      Coronary Artery Disease Father      C.A.D. Paternal Grandfather      DIABETES Paternal Grandfather          Current Outpatient Prescriptions   Medication Sig Dispense Refill     isoniazid (NYDRAZID) 300 MG tablet Take 1 tablet (300 mg) by mouth daily 90 tablet 1     buPROPion (WELLBUTRIN XL) 150 MG 24 hr tablet Take 1 tablet (150 mg) by mouth every morning 90 tablet 1     oxyCODONE-acetaminophen (PERCOCET) 5-325 MG per tablet Take 1 tablet by mouth every 4 hours as needed for pain maximum 8 tablet(s) per day (Patient not taking: Reported on 9/15/2017) 18 tablet 0     Allergies   Allergen Reactions     Nkda [No Known Drug Allergies]      Wasps [Hornets] Nausea and Vomiting and Hives     BP Readings from Last 3 Encounters:   09/15/17 136/89   08/13/17 (!) 155/99   04/03/17 130/89    Wt Readings from Last 3 Encounters:   09/15/17 210 lb (95.3 kg)   08/13/17 213 lb (96.6 kg)   04/03/17 208 lb 6.4 oz (94.5 kg)                        Reviewed and updated as needed this visit by clinical staffTobacco  Allergies  Med Hx  Surg Hx  Fam Hx  Soc Hx      Reviewed and updated as needed this visit by Provider         10 point ROS of systems including Constitutional, Eyes, Respiratory, Cardiovascular, Gastroenterology, Genitourinary, Integumentary, Muscularskeletal, Psychiatric were all negative except for pertinent positives noted in my HPI.    OBJECTIVE:     /89 (BP Location: Right arm, Patient Position: Chair, Cuff Size: Adult Regular)  Pulse 83   "Temp 98.3  F (36.8  C) (Oral)  Resp 16  Ht 5' 7.25\" (1.708 m)  Wt 210 lb (95.3 kg)  BMI 32.65 kg/m2  Body mass index is 32.65 kg/(m^2).  GENERAL: healthy, alert and no distress  NECK: no adenopathy, no asymmetry  RESP: lungs clear to auscultation - no rales, rhonchi or wheezes  CV: regular rate and rhythm  MS: no gross musculoskeletal defects noted      Diagnostic Test Results:  none     ASSESSMENT/PLAN:             1. Latent tuberculosis by skin test    - isoniazid (NYDRAZID) 300 MG tablet; Take 1 tablet (300 mg) by mouth daily  Dispense: 90 tablet; Refill: 1  Discussed how to take the medication(s), expected outcomes, potential side effects.  After discussion she is in agreement to treatment. Letter written with updates for her current employer.      See Patient Instructions  Patient Instructions   Will initiate treatment for the next 6 months.  [facility] web site for more information regarding latent TB and treatment.  Follow up if any further concerns.        Thank you for choosing [facility].  You may be receiving a survey in the mail from [employer] regarding your visit today.  Please take a few minutes to complete and return the survey to let us know how we are doing.      Our Clinic hours are:  Mondays    7:20 am - 7 pm  Tues -  Fri  7:20 am - 5 pm    Clinic Phone: [phone]    The clinic lab opens at 7:30 am Mon - Fri and appointments are required.    [facility]  Ph. [phone]  Monday-Thursday 8 am - 7pm  Tues/Wed/Fri 8 am - 5:30 pm             MICHAEL Raza CNP  [facility]    "

## 2017-09-15 NOTE — LETTER
[facility]  [address]  [facility] [address]  Phone: [phone]    September 15, 2017        Consuelo Medellin  [address] [facility] [address]          To whom it may concern:    RE: Consuelo Medellin    Patient tested positive for second ppd test.  CHEST X RAY  Was negative for active TB.  She was seen in clinic on 9/15/17 and will start treatment for latent TB.   She has no signs of active disease.       Please contact me for questions or concerns.      Sincerely,        MICHAEL Raza CNP

## 2017-09-22 ENCOUNTER — HOSPITAL ENCOUNTER (OUTPATIENT)
Dept: PHYSICAL THERAPY | Facility: CLINIC | Age: 47
Setting detail: THERAPIES SERIES
End: 2017-09-22
Attending: NURSE PRACTITIONER
Payer: COMMERCIAL

## 2017-09-22 PROCEDURE — 40000718 ZZHC STATISTIC PT DEPARTMENT ORTHO VISIT: Performed by: PHYSICAL THERAPIST

## 2017-09-22 PROCEDURE — 97110 THERAPEUTIC EXERCISES: CPT | Mod: GP | Performed by: PHYSICAL THERAPIST

## 2017-09-22 NOTE — PROGRESS NOTES
Consuelo Medellin  1970  MICHAEL Whyte CNP  [address] [place] [address]  Diagnosis:  LBP, R LE radiculopathy Physical Therapy Discharge Summary  09/22/17 0900   Signing Clinician's Name / Credentials   Signing clinician's name / credentials Eun Michael PT, DPT   Session Number   Session Number 5  MA pending  SOC 8/25/2017   Adult Goals   PT Ortho Eval Goals 1;2;3   Ortho Goal 1   Goal Description abolish back and radicular pain in order to resume previous lifestyle   Target Date 10/25/17   Date Met 09/22/17   Ortho Goal 2   Goal Description Independent in HEP   Target Date 10/25/17   Date Met 09/22/17   Ortho Goal 3   Goal Description resume previous activities:  house keeping, meal prep; ; community activities  (uses internal cues (discomfort) to maintain proper posture )   Target Date 10/25/17   Date Met 09/13/17   Objective Measure 1   Objective Measure MIKE   Details 0   Objective Measure 2   Objective Measure Abdiel   Details 0 low   Objective Measure 3   Objective Measure Pain at beginning of session   Details 0  has been 0/10 all week   Therapeutic Procedure/exercise   Minutes 21   Skilled Intervention education and exercise instruction to manage radicular symptoms   Patient Response good demo of ex   Treatment Detail review of knee plank progressedto full plank; instructed in side plank; instructed in Pilates ellen cross; reviewed body mechanics for washing windows, cleaning floors; picking up items from the floor   Education   Learner Patient   Readiness Eager;Acceptance   Method Booklet/handout;Explanation;Demonstration   Response Demonstrates Understanding;Verbalizes Understanding   Plan   Home program above modifications; continue previous   Updates to plan of care DC as goals are met, pt independent in HEP   Total Session Time   Timed Code Treatment Minutes 20

## 2017-10-03 ENCOUNTER — ALLIED HEALTH/NURSE VISIT (OUTPATIENT)
Dept: FAMILY MEDICINE | Facility: CLINIC | Age: 47
End: 2017-10-03

## 2017-10-03 DIAGNOSIS — Z23 NEED FOR PROPHYLACTIC VACCINATION AND INOCULATION AGAINST INFLUENZA: Primary | ICD-10-CM

## 2017-10-03 PROCEDURE — 90686 IIV4 VACC NO PRSV 0.5 ML IM: CPT

## 2017-10-03 PROCEDURE — 90471 IMMUNIZATION ADMIN: CPT

## 2017-10-03 PROCEDURE — 99207 ZZC NO CHARGE NURSE ONLY: CPT

## 2017-10-03 NOTE — PROGRESS NOTES
Injectable Influenza Immunization Documentation    1.  Is the person to be vaccinated sick today?   No    2. Does the person to be vaccinated have an allergy to a component   of the vaccine?   No    3. Has the person to be vaccinated ever had a serious reaction   to influenza vaccine in the past?   No    4. Has the person to be vaccinated ever had Guillain-Barré syndrome?   No    Form completed by John VILLAR

## 2017-10-03 NOTE — MR AVS SNAPSHOT
After Visit Summary   10/3/2017    Consuelo Medellin    MRN: [id number]           Patient Information     Date Of Birth          1970        Visit Information        Provider Department      10/3/2017 1:25 PM [facility]        Today's Diagnoses     Need for prophylactic vaccination and inoculation against influenza    -  1       Follow-ups after your visit        Who to contact     If you have questions or need follow up information about today's clinic visit or your schedule please contact [facility] directly at [phone].  Normal or non-critical lab and imaging results will be communicated to you by STYLIGHThart, letter or phone within 4 business days after the clinic has received the results. If you do not hear from us within 7 days, please contact the clinic through [employer] or phone. If you have a critical or abnormal lab result, we will notify you by phone as soon as possible.  Submit refill requests through [employer] or call your pharmacy and they will forward the refill request to us. Please allow 3 business days for your refill to be completed.          Additional Information About Your Visit        MyChart Information     [employer] gives you secure access to your electronic health record. If you see a primary care provider, you can also send messages to your care team and make appointments. If you have questions, please call your primary care clinic.  If you do not have a primary care provider, please call [phone] and they will assist you.        Care EveryWhere ID     This is your Care EveryWhere ID. This could be used by other organizations to access your [place] medical records  [id number]         Blood Pressure from Last 3 Encounters:   09/15/17 136/89   08/13/17 (!) 155/99   04/03/17 130/89    Weight from Last 3 Encounters:   09/15/17 210 lb (95.3 kg)   08/13/17 213 lb (96.6 kg)   04/03/17 208 lb 6.4 oz (94.5 kg)              We Performed  the Following     FLU VACCINE, 3 YRS +, IM (QUADRIVALENT W/PRESERVATIVES/MULTI-DOSE) [79293]     Vaccine Administration, Initial [76882]        Primary Care Provider Office Phone # Fax #    Nano RosasCLEOPATRA carlton [phone] [address] [facility] [address]        Equal Access to Services     BREEZY PATEL : Hadii aad ku hadasho Soomaali, waaxda luqadaha, qaybta kaalmada adeegyada, waxay idiin hayaan adedavid khganeshsh lapiedad . So [facility] [place] [facility] [phone].    ATENCIÓN: Si habla español, tiene a navarro disposición servicios gratuitos de asistencia lingüística. Ivan al [phone].    We comply with applicable federal civil rights laws and [place] laws. We do not discriminate on the basis of race, color, national origin, age, disability, sex, sexual orientation, or gender identity.            Thank you!     Thank you for choosing [facility]  for your care. Our goal is always to provide you with excellent care. Hearing back from our patients is one way we can continue to improve our services. Please take a few minutes to complete the written survey that you may receive in the mail after your visit with us. Thank you!             Your Updated Medication List - Protect others around you: Learn how to safely use, store and throw away your medicines at www.disposemymeds.org.          This list is accurate as of: 10/3/17  1:36 PM.  Always use your most recent med list.                   Brand Name Dispense Instructions for use Diagnosis    buPROPion 150 MG 24 hr tablet    WELLBUTRIN XL    90 tablet    Take 1 tablet (150 mg) by mouth every morning    Seasonal affective disorder (H), Major depressive disorder, recurrent episode, mild (H), Attention deficit disorder of adult, Need for prophylactic vaccination and inoculation against influenza       isoniazid 300 MG tablet    NYDRAZID    90 tablet    Take 1 tablet (300 mg) by mouth daily    Latent tuberculosis by skin test        oxyCODONE-acetaminophen 5-325 MG per tablet    PERCOCET    18 tablet    Take 1 tablet by mouth every 4 hours as needed for pain maximum 8 tablet(s) per day

## 2017-12-12 ENCOUNTER — TELEPHONE (OUTPATIENT)
Dept: FAMILY MEDICINE | Facility: CLINIC | Age: 47
End: 2017-12-12

## 2017-12-12 NOTE — TELEPHONE ENCOUNTER
Panel Management Review      Patient has the following on her problem list:     Depression / Dysthymia review    Measure:  Needs PHQ-9 score of 4 or less during index window.  Administer PHQ-9 and if score is 5 or more, send encounter to provider for next steps.    5 - 7 month window range: no    PHQ-9 SCORE 2/1/2016 10/14/2016 4/3/2017   Total Score - - -   Total Score 0 0 0       If PHQ-9 recheck is 5 or more, route to provider for next steps.    Patient is due for:  PHQ9        Composite cancer screening  Chart review shows that this patient is due/due soon for the following None  Summary:    Patient is due/failing the following:   PHQ9    Action needed:   Patient needs to do PHQ9.    Type of outreach:    Phone, left message for patient to call back.     Questions for provider review:    None                                                                                                                                    Karla Card MA

## 2017-12-14 ASSESSMENT — PATIENT HEALTH QUESTIONNAIRE - PHQ9: SUM OF ALL RESPONSES TO PHQ QUESTIONS 1-9: 0

## 2018-04-23 DIAGNOSIS — F33.0 MAJOR DEPRESSIVE DISORDER, RECURRENT EPISODE, MILD (H): ICD-10-CM

## 2018-04-23 DIAGNOSIS — Z23 NEED FOR PROPHYLACTIC VACCINATION AND INOCULATION AGAINST INFLUENZA: ICD-10-CM

## 2018-04-23 DIAGNOSIS — F98.8 ATTENTION DEFICIT DISORDER OF ADULT: ICD-10-CM

## 2018-04-23 DIAGNOSIS — F33.8 SEASONAL AFFECTIVE DISORDER (H): ICD-10-CM

## 2018-04-23 NOTE — TELEPHONE ENCOUNTER
"Requested Prescriptions   Pending Prescriptions Disp Refills     buPROPion (WELLBUTRIN XL) 150 MG 24 hr tablet  Last Written Prescription Date:  09/07/17  Last Fill Quantity: 90,  # refills: 1   Last office visit: 10/3/2017 with prescribing provider:  09/15/17   Future Office Visit:     90 tablet 1     Sig: Take 1 tablet (150 mg) by mouth every morning    SSRIs Protocol Failed    4/23/2018  3:06 PM       Failed - PHQ-9 score less than 5 in past 6 months    Please review last PHQ-9 score.   PHQ-9 SCORE 10/14/2016 4/3/2017 12/14/2017   Total Score - - -   Total Score 0 0 0          Failed - Recent (6 mo) or future (30 days) visit within the authorizing provider's specialty    Patient had office visit in the last 6 months or has a visit in the next 30 days with authorizing provider or within the authorizing provider's specialty.  See \"Patient Info\" tab in inbasket, or \"Choose Columns\" in Meds & Orders section of the refill encounter.         Passed - Medication is Bupropion    If the medication is Bupropion (Wellbutrin), and the patient is taking for smoking cessation; OK to refill.       Passed - Patient is age 18 or older       Passed - No active pregnancy on record       Passed - No positive pregnancy test in last 12 months        "

## 2018-04-24 RX ORDER — BUPROPION HYDROCHLORIDE 150 MG/1
150 TABLET ORAL EVERY MORNING
Qty: 30 TABLET | Refills: 0 | Status: SHIPPED | OUTPATIENT
Start: 2018-04-24 | End: 2018-05-29

## 2018-05-01 ENCOUNTER — ANESTHESIA EVENT (OUTPATIENT)
Dept: SURGERY | Facility: CLINIC | Age: 48
End: 2018-05-01
Payer: COMMERCIAL

## 2018-05-01 NOTE — ANESTHESIA PREPROCEDURE EVALUATION
Anesthesia Evaluation     .             ROS/MED HX    ENT/Pulmonary:       Neurologic:       Cardiovascular:     (+) ----. : . . . :. . Previous cardiac testing date:results:date: results:ECG reviewed date:2/26/17 results:Sinus Rhythm   Low voltage in precordial leads.    -Incomplete right bundle branch block.     ABNORMAL    date: results:          METS/Exercise Tolerance:     Hematologic:         Musculoskeletal:         GI/Hepatic: Comment: Ventral hernia        Renal/Genitourinary:         Endo:     (+) thyroid problem Obesity, .      Psychiatric: Comment: ADD  SAD    (+) psychiatric history depression      Infectious Disease:         Malignancy:         Other: Comment: Left cataract                      Physical Exam  Normal systems: cardiovascular, pulmonary and dental    Airway   Mallampati: II  TM distance: >3 FB  Neck ROM: full    Dental     Cardiovascular       Pulmonary                         Anesthesia Plan      History & Physical Review  History and physical reviewed and following examination; no interval change.    ASA Status:  3 .    NPO Status:  > 8 hours    Plan for MAC   PONV prophylaxis:  Ondansetron (or other 5HT-3) and Dexamethasone or Solumedrol       Postoperative Care  Postoperative pain management:  IV analgesics and Oral pain medications.      Consents  Anesthetic plan, risks, benefits and alternatives discussed with:  Patient..                          .

## 2018-05-07 ENCOUNTER — HOSPITAL ENCOUNTER (OUTPATIENT)
Facility: CLINIC | Age: 48
Discharge: HOME | End: 2018-05-07
Attending: OPHTHALMOLOGY | Admitting: OPHTHALMOLOGY
Payer: COMMERCIAL

## 2018-05-07 ENCOUNTER — SURGERY (OUTPATIENT)
Age: 48
End: 2018-05-07

## 2018-05-07 ENCOUNTER — ANESTHESIA (OUTPATIENT)
Dept: SURGERY | Facility: CLINIC | Age: 48
End: 2018-05-07
Payer: COMMERCIAL

## 2018-05-07 VITALS
OXYGEN SATURATION: 98 % | RESPIRATION RATE: 16 BRPM | DIASTOLIC BLOOD PRESSURE: 90 MMHG | HEART RATE: 71 BPM | HEIGHT: 68 IN | BODY MASS INDEX: 31.83 KG/M2 | SYSTOLIC BLOOD PRESSURE: 129 MMHG | TEMPERATURE: 98.4 F | WEIGHT: 210 LBS

## 2018-05-07 LAB — HCG UR QL: NEGATIVE

## 2018-05-07 PROCEDURE — 37000012 ZZH ANESTHESIA CATARACT PACKAGE: Performed by: OPHTHALMOLOGY

## 2018-05-07 PROCEDURE — 71000022 ZZH RECOVERY CATRACT PACKAGE: Performed by: OPHTHALMOLOGY

## 2018-05-07 PROCEDURE — 36000025 ZZH CATARACT SURGICAL PACKAGE: Performed by: OPHTHALMOLOGY

## 2018-05-07 PROCEDURE — 25000125 ZZHC RX 250: Performed by: OPHTHALMOLOGY

## 2018-05-07 PROCEDURE — 25000125 ZZHC RX 250: Performed by: NURSE ANESTHETIST, CERTIFIED REGISTERED

## 2018-05-07 PROCEDURE — 25000128 H RX IP 250 OP 636: Performed by: NURSE ANESTHETIST, CERTIFIED REGISTERED

## 2018-05-07 PROCEDURE — 25000128 H RX IP 250 OP 636: Performed by: OPHTHALMOLOGY

## 2018-05-07 PROCEDURE — 81025 URINE PREGNANCY TEST: CPT | Performed by: NURSE ANESTHETIST, CERTIFIED REGISTERED

## 2018-05-07 PROCEDURE — V2632 POST CHMBR INTRAOCULAR LENS: HCPCS | Performed by: OPHTHALMOLOGY

## 2018-05-07 DEVICE — EYE IMP IOL AMO PCL TECNIS ZCB00 21.5: Type: IMPLANTABLE DEVICE | Site: EYE | Status: FUNCTIONAL

## 2018-05-07 RX ORDER — LIDOCAINE 40 MG/G
CREAM TOPICAL
Status: DISCONTINUED | OUTPATIENT
Start: 2018-05-07 | End: 2018-05-07 | Stop reason: HOSPADM

## 2018-05-07 RX ORDER — PROPARACAINE HYDROCHLORIDE 5 MG/ML
SOLUTION/ DROPS OPHTHALMIC PRN
Status: DISCONTINUED | OUTPATIENT
Start: 2018-05-07 | End: 2018-05-07 | Stop reason: HOSPADM

## 2018-05-07 RX ORDER — SODIUM CHLORIDE, SODIUM LACTATE, POTASSIUM CHLORIDE, CALCIUM CHLORIDE 600; 310; 30; 20 MG/100ML; MG/100ML; MG/100ML; MG/100ML
INJECTION, SOLUTION INTRAVENOUS CONTINUOUS
Status: DISCONTINUED | OUTPATIENT
Start: 2018-05-07 | End: 2018-05-07 | Stop reason: HOSPADM

## 2018-05-07 RX ORDER — CYCLOPENTOLATE HYDROCHLORIDE 10 MG/ML
1 SOLUTION/ DROPS OPHTHALMIC
Status: COMPLETED | OUTPATIENT
Start: 2018-05-07 | End: 2018-05-07

## 2018-05-07 RX ORDER — PHENYLEPHRINE HYDROCHLORIDE 25 MG/ML
1 SOLUTION/ DROPS OPHTHALMIC
Status: COMPLETED | OUTPATIENT
Start: 2018-05-07 | End: 2018-05-07

## 2018-05-07 RX ORDER — TROPICAMIDE 10 MG/ML
1 SOLUTION/ DROPS OPHTHALMIC
Status: COMPLETED | OUTPATIENT
Start: 2018-05-07 | End: 2018-05-07

## 2018-05-07 RX ADMIN — CYCLOPENTOLATE HYDROCHLORIDE 1 DROP: 10 SOLUTION/ DROPS OPHTHALMIC at 10:30

## 2018-05-07 RX ADMIN — TROPICAMIDE 1 DROP: 10 SOLUTION/ DROPS OPHTHALMIC at 10:26

## 2018-05-07 RX ADMIN — PROPARACAINE HYDROCHLORIDE 2 DROP: 5 SOLUTION/ DROPS OPHTHALMIC at 11:43

## 2018-05-07 RX ADMIN — CYCLOPENTOLATE HYDROCHLORIDE 1 DROP: 10 SOLUTION/ DROPS OPHTHALMIC at 10:25

## 2018-05-07 RX ADMIN — TROPICAMIDE 1 DROP: 10 SOLUTION/ DROPS OPHTHALMIC at 10:31

## 2018-05-07 RX ADMIN — MIDAZOLAM 2 MG: 1 INJECTION INTRAMUSCULAR; INTRAVENOUS at 11:33

## 2018-05-07 RX ADMIN — CYCLOPENTOLATE HYDROCHLORIDE 1 DROP: 10 SOLUTION/ DROPS OPHTHALMIC at 10:38

## 2018-05-07 RX ADMIN — PHENYLEPHRINE HYDROCHLORIDE 1 DROP: 25 SOLUTION/ DROPS OPHTHALMIC at 10:27

## 2018-05-07 RX ADMIN — SODIUM CHLORIDE, POTASSIUM CHLORIDE, SODIUM LACTATE AND CALCIUM CHLORIDE: 600; 310; 30; 20 INJECTION, SOLUTION INTRAVENOUS at 10:45

## 2018-05-07 RX ADMIN — TROPICAMIDE 1 DROP: 10 SOLUTION/ DROPS OPHTHALMIC at 10:38

## 2018-05-07 RX ADMIN — PHENYLEPHRINE HYDROCHLORIDE 1 DROP: 25 SOLUTION/ DROPS OPHTHALMIC at 10:32

## 2018-05-07 RX ADMIN — LIDOCAINE HYDROCHLORIDE 0.5 ML: 10 INJECTION, SOLUTION EPIDURAL; INFILTRATION; INTRACAUDAL; PERINEURAL at 10:45

## 2018-05-07 RX ADMIN — EPINEPHRINE 0.8 ML: 1 INJECTION, SOLUTION INTRAMUSCULAR; SUBCUTANEOUS at 11:42

## 2018-05-07 RX ADMIN — MOXIFLOXACIN HYDROCHLORIDE 0.5 ML: 5 SOLUTION/ DROPS OPHTHALMIC at 11:54

## 2018-05-07 RX ADMIN — LIDOCAINE HYDROCHLORIDE 1 TUBE: 20 JELLY TOPICAL at 11:42

## 2018-05-07 RX ADMIN — PHENYLEPHRINE HYDROCHLORIDE 1 DROP: 25 SOLUTION/ DROPS OPHTHALMIC at 10:39

## 2018-05-07 NOTE — OP NOTE
CATARACT OPERATIVE NOTE    PATIENT: Consuelo Medellin  DATE OF SURGERY: 5/7/2018  PREOPERATIVE DIAGNOSIS:  Senile Nuclear Cataract, Left eye  POSTOPERATIVE DIAGNOSIS:  Senile Nuclear Cataract, Left eye  OPERATIVE PROCEDURE:  Phacoemulsification and placement of intraocular lens  SURGEON:  Sukh Mackey MD  ANESTHESIA:  Topical / MAC  EBL:  None  SPECIMENS:  None  COMPLICATIONS:  None    PROCEDURE:  The patient was brought to the operating room at [facility].  The left eye was prepped and draped in the usual fashion for cataract surgery.  A wire lid speculum was inserted.  A super sharp blade was used to make a paracentesis at the 5 O'clock position.  The super sharp blade was used to make a partial thickness temporal groove, which was 3 mm in length.  0.8 mL of non-preserved epi-Shugarcaine was injected into the anterior chamber.  Viscoelastic was used to inflate the anterior chamber through a cannula.  A 2.5 mm microkeratome was used to make a temporal clear corneal incision in a two-plane fashion.  A cystotome needle and forceps were used to make a capsulorrhexis.  Hydrodissection and hydrodelineation were performed with Balance Salt Solution.  The lens was then phacoemulsified and removed without complications.  The cortical material was removed with bimanual irrigation and aspiration.  The capsular bag was filled with viscoelastic.  A posterior chamber intraocular lens, preselected and recorded, was folded and inserted into the capsular bag.  The viscoelastic was removed with the irrigation and aspiration tip.  Balanced Salt Solution with Vigamox, 150mg/0.1mL, was used to refill the anterior chamber.  The wounds were checked for water tightness and required no suture.  The wire lid speculum was removed.  The patient's left eye was cleaned and a drop of each post-operative drop was placed, followed by a rivera shield.  The patient tolerated the procedure well, and there were no  complications.      Sukh Mackey MD

## 2018-05-07 NOTE — ANESTHESIA POSTPROCEDURE EVALUATION
Patient: Consuelo Medellin    Procedure(s):  Left cataract removal with implant - Wound Class: I-Clean    Diagnosis:Left cataract removal with implant  Diagnosis Additional Information: No value filed.    Anesthesia Type:  MAC    Note:  Anesthesia Post Evaluation    Patient location during evaluation: Phase 2 and Bedside  Patient participation: Able to fully participate in evaluation  Level of consciousness: awake  Pain management: adequate  Airway patency: patent  Cardiovascular status: acceptable  Respiratory status: acceptable  Hydration status: acceptable  PONV: none     Anesthetic complications: None          Last vitals:  Vitals:    05/07/18 1013   BP: 132/89   Pulse: 71   Resp: 16   Temp: 36.9  C (98.4  F)   SpO2: 97%         Electronically Signed By: Sukh Richards CRNA, APRN CRNA  May 7, 2018  11:58 AM

## 2018-05-07 NOTE — ANESTHESIA CARE TRANSFER NOTE
Patient: Consuelo Medellin    Procedure(s):  Left cataract removal with implant - Wound Class: I-Clean    Diagnosis: Left cataract removal with implant  Diagnosis Additional Information: No value filed.    Anesthesia Type:   MAC     Note:  Airway :Room Air  Patient transferred to:Phase II  Handoff Report: Identifed the Patient, Identified the Reponsible Provider, Reviewed the pertinent medical history, Discussed the surgical course, Reviewed Intra-OP anesthesia mangement and issues during anesthesia, Set expectations for post-procedure period and Allowed opportunity for questions and acknowledgement of understanding      Vitals: (Last set prior to Anesthesia Care Transfer)    CRNA VITALS  5/7/2018 1125 - 5/7/2018 1158      5/7/2018             Pulse: 73    SpO2: 97 %                Electronically Signed By: Sukh Richards CRNA, APRN CRNA  May 7, 2018  11:58 AM

## 2018-05-29 DIAGNOSIS — F33.0 MAJOR DEPRESSIVE DISORDER, RECURRENT EPISODE, MILD (H): ICD-10-CM

## 2018-05-29 DIAGNOSIS — Z23 NEED FOR PROPHYLACTIC VACCINATION AND INOCULATION AGAINST INFLUENZA: ICD-10-CM

## 2018-05-29 DIAGNOSIS — F98.8 ATTENTION DEFICIT DISORDER OF ADULT: ICD-10-CM

## 2018-05-29 DIAGNOSIS — F33.8 SEASONAL AFFECTIVE DISORDER (H): ICD-10-CM

## 2018-05-29 NOTE — TELEPHONE ENCOUNTER
"Requested Prescriptions   Pending Prescriptions Disp Refills     buPROPion (WELLBUTRIN XL) 150 MG 24 hr tablet  Last Written Prescription Date:  04/24/2018  Last Fill Quantity: 30,  # refills: 0   Last office visit: 09/15/2017 with  provider:  Kathryn   Future Office Visit:     30 tablet 0     Sig: Take 1 tablet (150 mg) by mouth every morning (Needs follow-up appointment for this medication)    SSRIs Protocol Failed    5/29/2018  9:52 AM       Failed - PHQ-9 score less than 5 in past 6 months    Please review last PHQ-9 score.          Failed - Recent (6 mo) or future (30 days) visit within the authorizing provider's specialty    Patient had office visit in the last 6 months or has a visit in the next 30 days with authorizing provider or within the authorizing provider's specialty.  See \"Patient Info\" tab in inbasket, or \"Choose Columns\" in Meds & Orders section of the refill encounter.           Passed - Medication is Bupropion    If the medication is Bupropion (Wellbutrin), and the patient is taking for smoking cessation; OK to refill.         Passed - Patient is age 18 or older       Passed - No active pregnancy on record       Passed - No positive pregnancy test in last 12 months          "

## 2018-05-30 ENCOUNTER — TELEPHONE (OUTPATIENT)
Dept: FAMILY MEDICINE | Facility: CLINIC | Age: 48
End: 2018-05-30

## 2018-05-30 NOTE — TELEPHONE ENCOUNTER
Pt picked up script for Wellbutrin and it states no refills. Does she need to make an appointment to get her next fill? Please advise.  Niecy Bassett  CSS

## 2018-05-30 NOTE — TELEPHONE ENCOUNTER
Spoke with pt and indicated she is due for an appointment in clinic for additional refills.  Pt is now scheduled with Nano on 6/5/18.    Vicky MOREL RN

## 2018-05-31 RX ORDER — BUPROPION HYDROCHLORIDE 150 MG/1
150 TABLET ORAL EVERY MORNING
Qty: 15 TABLET | Refills: 0 | Status: SHIPPED | OUTPATIENT
Start: 2018-05-31 | End: 2018-06-21

## 2018-05-31 NOTE — TELEPHONE ENCOUNTER
Left message on patient's self identified voicemail that she is due for OV for refills on Wellbutrin, and for patient to return call to clinic    Pauline PRADHAN Rn

## 2018-06-21 ENCOUNTER — OFFICE VISIT (OUTPATIENT)
Dept: FAMILY MEDICINE | Facility: CLINIC | Age: 48
End: 2018-06-21
Payer: COMMERCIAL

## 2018-06-21 VITALS
TEMPERATURE: 97.5 F | BODY MASS INDEX: 32.66 KG/M2 | DIASTOLIC BLOOD PRESSURE: 94 MMHG | HEIGHT: 68 IN | HEART RATE: 73 BPM | WEIGHT: 215.5 LBS | SYSTOLIC BLOOD PRESSURE: 144 MMHG

## 2018-06-21 DIAGNOSIS — E89.0 S/P THYROIDECTOMY: ICD-10-CM

## 2018-06-21 DIAGNOSIS — Z13.6 CARDIOVASCULAR SCREENING; LDL GOAL LESS THAN 160: Primary | ICD-10-CM

## 2018-06-21 DIAGNOSIS — Z83.3 FAMILY HISTORY OF DIABETES MELLITUS: ICD-10-CM

## 2018-06-21 DIAGNOSIS — F33.8 SEASONAL AFFECTIVE DISORDER (H): ICD-10-CM

## 2018-06-21 DIAGNOSIS — F98.8 ATTENTION DEFICIT DISORDER OF ADULT: ICD-10-CM

## 2018-06-21 DIAGNOSIS — Z00.00 ENCOUNTER FOR ROUTINE ADULT HEALTH EXAMINATION WITHOUT ABNORMAL FINDINGS: ICD-10-CM

## 2018-06-21 DIAGNOSIS — Z23 NEED FOR PROPHYLACTIC VACCINATION AND INOCULATION AGAINST INFLUENZA: ICD-10-CM

## 2018-06-21 DIAGNOSIS — E04.1 THYROID NODULE: ICD-10-CM

## 2018-06-21 DIAGNOSIS — F33.0 MAJOR DEPRESSIVE DISORDER, RECURRENT EPISODE, MILD (H): ICD-10-CM

## 2018-06-21 LAB
CHOLEST SERPL-MCNC: 162 MG/DL
GLUCOSE SERPL-MCNC: 96 MG/DL (ref 70–99)
HBA1C MFR BLD: 5.6 % (ref 0–5.6)
HDLC SERPL-MCNC: 45 MG/DL
LDLC SERPL CALC-MCNC: 91 MG/DL
NONHDLC SERPL-MCNC: 117 MG/DL
T4 FREE SERPL-MCNC: 0.85 NG/DL (ref 0.76–1.46)
TRIGL SERPL-MCNC: 128 MG/DL
TSH SERPL DL<=0.005 MIU/L-ACNC: 5.22 MU/L (ref 0.4–4)

## 2018-06-21 PROCEDURE — 82947 ASSAY GLUCOSE BLOOD QUANT: CPT | Performed by: NURSE PRACTITIONER

## 2018-06-21 PROCEDURE — 84443 ASSAY THYROID STIM HORMONE: CPT | Performed by: NURSE PRACTITIONER

## 2018-06-21 PROCEDURE — 83036 HEMOGLOBIN GLYCOSYLATED A1C: CPT | Performed by: NURSE PRACTITIONER

## 2018-06-21 PROCEDURE — 99396 PREV VISIT EST AGE 40-64: CPT | Performed by: NURSE PRACTITIONER

## 2018-06-21 PROCEDURE — 84439 ASSAY OF FREE THYROXINE: CPT | Performed by: NURSE PRACTITIONER

## 2018-06-21 PROCEDURE — 80061 LIPID PANEL: CPT | Performed by: NURSE PRACTITIONER

## 2018-06-21 PROCEDURE — 36415 COLL VENOUS BLD VENIPUNCTURE: CPT | Performed by: NURSE PRACTITIONER

## 2018-06-21 RX ORDER — BUPROPION HYDROCHLORIDE 150 MG/1
150 TABLET ORAL EVERY MORNING
Qty: 90 TABLET | Refills: 3 | Status: SHIPPED | OUTPATIENT
Start: 2018-06-21 | End: 2019-10-03

## 2018-06-21 ASSESSMENT — ANXIETY QUESTIONNAIRES
6. BECOMING EASILY ANNOYED OR IRRITABLE: NOT AT ALL
3. WORRYING TOO MUCH ABOUT DIFFERENT THINGS: NOT AT ALL
GAD7 TOTAL SCORE: 0
2. NOT BEING ABLE TO STOP OR CONTROL WORRYING: NOT AT ALL
IF YOU CHECKED OFF ANY PROBLEMS ON THIS QUESTIONNAIRE, HOW DIFFICULT HAVE THESE PROBLEMS MADE IT FOR YOU TO DO YOUR WORK, TAKE CARE OF THINGS AT HOME, OR GET ALONG WITH OTHER PEOPLE: NOT DIFFICULT AT ALL
5. BEING SO RESTLESS THAT IT IS HARD TO SIT STILL: NOT AT ALL
7. FEELING AFRAID AS IF SOMETHING AWFUL MIGHT HAPPEN: NOT AT ALL
1. FEELING NERVOUS, ANXIOUS, OR ON EDGE: NOT AT ALL

## 2018-06-21 ASSESSMENT — PATIENT HEALTH QUESTIONNAIRE - PHQ9: 5. POOR APPETITE OR OVEREATING: NOT AT ALL

## 2018-06-21 NOTE — LETTER
July 9, 2018      Consuelo Medellin  [address] [place] [address]        Dear ,    We are writing to inform you of your test results.  All labs look good except Thyroid testing is only very mildly elevated.  Usually we just monitor this and recheck in 2-3 months.Future lab test was ordered. Please call outpatient lab at [phone] at that time to schedule. This is a non fasting lab test.   Resulted Orders   Lipid panel reflex to direct LDL Fasting   Result Value Ref Range    Cholesterol 162 <200 mg/dL    Triglycerides 128 <150 mg/dL    HDL Cholesterol 45 (L) >49 mg/dL    LDL Cholesterol Calculated 91 <100 mg/dL      Comment:      Desirable:       <100 mg/dl    Non HDL Cholesterol 117 <130 mg/dL   Glucose   Result Value Ref Range    Glucose 96 70 - 99 mg/dL   TSH   Result Value Ref Range    TSH 5.22 (H) 0.40 - 4.00 mU/L   T4 FREE   Result Value Ref Range    T4 Free 0.85 0.76 - 1.46 ng/dL   Hemoglobin A1c   Result Value Ref Range    Hemoglobin A1C 5.6 0 - 5.6 %      Comment:      Normal <5.7% Prediabetes 5.7-6.4%  Diabetes 6.5% or higher - adopted from ADA   consensus guidelines.     If you have any questions or concerns, please call the clinic at the number listed above.     Sincerely,        Nano Quinonez NP/caro

## 2018-06-21 NOTE — MR AVS SNAPSHOT
After Visit Summary   6/21/2018    Consuelo Medellin    MRN: [id number]           Patient Information     Date Of Birth          1970        Visit Information        Provider Department      6/21/2018 8:00 AM Nano Quinonez NP [facility]        Today's Diagnoses     CARDIOVASCULAR SCREENING; LDL GOAL LESS THAN 160    -  1    Encounter for routine adult health examination without abnormal findings        Seasonal affective disorder (H)        Major depressive disorder, recurrent episode, mild (H)        Attention deficit disorder of adult        Thyroid nodule        BMI 33.0-33.9,adult        Need for prophylactic vaccination and inoculation against influenza        Family history of diabetes mellitus        S/P thyroidectomy          Care Instructions      Preventive Health Recommendations  Female Ages 40 to 49    Yearly exam:     See your health care provider every year in order to  1. Review health changes.   2. Discuss preventive care.    3. Review your medicines if your doctor prescribed any.      Get a Pap test every three years (unless you have an abnormal result and your provider advises testing more often).      If you get Pap tests with HPV test, you only need to test every 5 years, unless you have an abnormal result. You do not need a Pap test if your uterus was removed (hysterectomy) and you have not had cancer.      You should be tested each year for STDs (sexually transmitted diseases), if you're at risk.       Ask your doctor if you should have a mammogram.      Have a colonoscopy (test for colon cancer) if someone in your family has had colon cancer or polyps before age 50.       Have a cholesterol test every 5 years.       Have a diabetes test (fasting glucose) after age 45. If you are at risk for diabetes, you should have this test every 3 years.    Shots: Get a flu shot each year. Get a tetanus shot every 10 years.     Nutrition:     Eat at least 5 servings of  fruits and vegetables each day.    Eat whole-grain bread, whole-wheat pasta and brown rice instead of white grains and rice.    Talk to your provider about Calcium and Vitamin D.     Lifestyle    Exercise at least 150 minutes a week (an average of 30 minutes a day, 5 days a week). This will help you control your weight and prevent disease.    Limit alcohol to one drink per day.    No smoking.     Wear sunscreen to prevent skin cancer.    See your dentist every six months for an exam and cleaning.          Follow-ups after your visit        Your next 10 appointments already scheduled     Jul 09, 2018   Procedure with Sukh Mackey MD   [facility] (--)    [address] [facility] [address] [facility] [address] [phone]           The medical center is located at [address]. (between [address] and [address] in [place], [place] miles north of [place]).              Who to contact     If you have questions or need follow up information about today's clinic visit or your schedule please contact [facility] directly at [phone].  Normal or non-critical lab and imaging results will be communicated to you by [employer], letter or phone within 4 business days after the clinic has received the results. If you do not hear from us within 7 days, please contact the clinic through [employer] or phone. If you have a critical or abnormal lab result, we will notify you by phone as soon as possible.  Submit refill requests through [employer] or call your pharmacy and they will forward the refill request to us. Please allow 3 business days for your refill to be completed.          Additional Information About Your Visit        [employer] Information     [employer] gives you secure access to your electronic health record. If you see a primary care provider, you can also send messages to your care team and make appointments. If you have questions, please call your primary care clinic.  If you do not have a  "primary care provider, please call [phone] and they will assist you.        Care EveryWhere ID     This is your Care EveryWhere ID. This could be used by other organizations to access your [facility] medical records  [id number]        Your Vitals Were     Pulse Temperature Height Breastfeeding? BMI (Body Mass Index)       73 97.5  F (36.4  C) (Tympanic) 5' 7.5\" (1.715 m) No 33.25 kg/m2        Blood Pressure from Last 3 Encounters:   06/21/18 (!) 144/94   05/07/18 129/90   09/15/17 136/89    Weight from Last 3 Encounters:   06/21/18 215 lb 8 oz (97.8 kg)   05/07/18 210 lb (95.3 kg)   09/15/17 210 lb (95.3 kg)              We Performed the Following     DEPRESSION ACTION PLAN (DAP)     Glucose     Hemoglobin A1c     Lipid panel reflex to direct LDL Fasting     T4 FREE     TSH          Today's Medication Changes          These changes are accurate as of 6/21/18  8:48 AM.  If you have any questions, ask your nurse or doctor.               These medicines have changed or have updated prescriptions.        Dose/Directions    buPROPion 150 MG 24 hr tablet   Commonly known as:  WELLBUTRIN XL   This may have changed:  additional instructions   Used for:  Seasonal affective disorder (H), Major depressive disorder, recurrent episode, mild (H), Attention deficit disorder of adult, Need for prophylactic vaccination and inoculation against influenza        Dose:  150 mg   Take 1 tablet (150 mg) by mouth every morning   Quantity:  90 tablet   Refills:  3            Where to get your medicines      These medications were sent to [facility] [address] [id number] [facility] [address]     Phone:  [phone]     buPROPion 150 MG 24 hr tablet                Primary Care Provider Office Phone # Fax #    Nano Quinonez -[phone]       [address] [facility] [address]        Equal Access to Services     BREEZY PATEL AH: kacey Guerin " kristina garciachinamary beth campospily mynorin hayaan adeeg kharash la'aan ah. So [facility] [phone].    ATENCIÓN: Si maria t billings, tiene a navarro disposición servicios gratuitos de asistencia lingüística. Ivan al [phone].    We comply with applicable federal civil rights laws and [place] laws. We do not discriminate on the basis of race, color, national origin, age, disability, sex, sexual orientation, or gender identity.            Thank you!     Thank you for choosing [facility]  for your care. Our goal is always to provide you with excellent care. Hearing back from our patients is one way we can continue to improve our services. Please take a few minutes to complete the written survey that you may receive in the mail after your visit with us. Thank you!             Your Updated Medication List - Protect others around you: Learn how to safely use, store and throw away your medicines at www.disposemymeds.org.          This list is accurate as of 6/21/18  8:48 AM.  Always use your most recent med list.                   Brand Name Dispense Instructions for use Diagnosis    buPROPion 150 MG 24 hr tablet    WELLBUTRIN XL    90 tablet    Take 1 tablet (150 mg) by mouth every morning    Seasonal affective disorder (H), Major depressive disorder, recurrent episode, mild (H), Attention deficit disorder of adult, Need for prophylactic vaccination and inoculation against influenza

## 2018-06-21 NOTE — NURSING NOTE
"Initial BP (!) 144/94  Pulse 73  Temp 97.5  F (36.4  C) (Tympanic)  Ht 5' 7.5\" (1.715 m)  Wt 215 lb 8 oz (97.8 kg)  Breastfeeding? No  BMI 33.25 kg/m2 Estimated body mass index is 33.25 kg/(m^2) as calculated from the following:    Height as of this encounter: 5' 7.5\" (1.715 m).    Weight as of this encounter: 215 lb 8 oz (97.8 kg). .    Adeline Darden, GRANT ([facility])  "

## 2018-06-21 NOTE — PROGRESS NOTES
SUBJECTIVE:   CC: Consuelo Medellin is an 48 year old woman who presents for preventive health visit.     Healthy Habits:    Do you get at least three servings of calcium containing foods daily (dairy, green leafy vegetables, etc.)? yes    Amount of exercise or daily activities, outside of work: 5 day(s) per week    Problems taking medications regularly No    Medication side effects: No    Have you had an eye exam in the past two years? yes    Do you see a dentist twice per year? yes    Do you have sleep apnea, excessive snoring or daytime drowsiness?no      Depression and Anxiety Follow-Up    Status since last visit: No change    Other associated symptoms:None    Complicating factors:     Significant life event: No     Current substance abuse: None    PHQ-9 4/3/2017 12/14/2017 6/21/2018   Total Score 0 0 0   Q9: Suicide Ideation Not at all Not at all Not at all     ROSY-7 SCORE 10/14/2016 4/3/2017 6/21/2018   Total Score - - -   Total Score 0 0 0     In the past two weeks have you had thoughts of suicide or self-harm?  No.    Do you have concerns about your personal safety or the safety of others?   No  PHQ-9  English  PHQ-9   Any Language  ROSY-7  Suicide Assessment Five-step Evaluation and Treatment (SAFE-T)    Today's PHQ-2 Score:   PHQ-2 ( 1999 Pfizer) 6/21/2018 12/14/2017   Q1: Little interest or pleasure in doing things 1 0   Q2: Feeling down, depressed or hopeless 1 0   PHQ-2 Score 2 0     Abuse: Current or Past(Physical, Sexual or Emotional)- No  Do you feel safe in your environment - Yes    Social History   Substance Use Topics     Smoking status: Never Smoker     Smokeless tobacco: Never Used     Alcohol use Yes      Comment: rare     If you drink alcohol do you typically have >3 drinks per day or >7 drinks per week? No                     Reviewed orders with patient.  Reviewed health maintenance and updated orders accordingly - Yes  Labs reviewed in EPIC  BP Readings from Last 3 Encounters:   06/21/18 (!)  144/94   05/07/18 129/90   09/15/17 136/89    Wt Readings from Last 3 Encounters:   06/21/18 215 lb 8 oz (97.8 kg)   05/07/18 210 lb (95.3 kg)   09/15/17 210 lb (95.3 kg)                  Patient Active Problem List   Diagnosis     CARDIOVASCULAR SCREENING; LDL GOAL LESS THAN 160     Blood type O+     Obesity     Attention deficit disorder of adult     Major depressive disorder, recurrent episode, mild (H)     Thyroid nodule     Seasonal affective disorder (H)     Family history of diabetes mellitus     Past Surgical History:   Procedure Laterality Date     GYN SURGERY      C section     HERNIORRHAPHY INGUINAL  10/26/2011    Procedure:HERNIORRHAPHY INGUINAL; Right Inguinal Herniorrhapy with Mesh Placement; Surgeon:ANITRA SHIELDS; Location:[facility]     HERNIORRHAPHY VENTRAL N/A 1/4/2017    Procedure: HERNIORRHAPHY VENTRAL;  Surgeon: Stevie Bangura MD;  Location: [facility]     PE TUBES       PHACOEMULSIFICATION WITH STANDARD INTRAOCULAR LENS IMPLANT Left 5/7/2018    Procedure: PHACOEMULSIFICATION WITH STANDARD INTRAOCULAR LENS IMPLANT;  Left cataract removal with implant;  Surgeon: Sukh Mackey MD;  Location: [facility]     THYROIDECTOMY Left 3/8/2016    Procedure: THYROIDECTOMY;  Surgeon: Anitra Ornelas MD;  Location:  OR       Social History   Substance Use Topics     Smoking status: Never Smoker     Smokeless tobacco: Never Used     Alcohol use Yes      Comment: rare     Family History   Problem Relation Age of Onset     HEART DISEASE Mother      arrythmia     Diabetes Father      C.A.D. Father      Coronary Artery Disease Father      C.A.D. Paternal Grandfather      Diabetes Paternal Grandfather          Current Outpatient Prescriptions   Medication Sig Dispense Refill     buPROPion (WELLBUTRIN XL) 150 MG 24 hr tablet Take 1 tablet (150 mg) by mouth every morning 90 tablet 3     [DISCONTINUED] buPROPion (WELLBUTRIN XL) 150 MG 24 hr tablet Take 1 tablet (150 mg) by mouth every morning  (Needs follow-up appointment for this medication) 15 tablet 0     Allergies   Allergen Reactions     Nkda [No Known Drug Allergies]      Wasps [Hornets] Nausea and Vomiting and Hives     Recent Labs   Lab Test  06/21/18   0852  02/26/17 2050  02/01/16   0920  02/24/14   0930   A1C  5.6   --    --    --    LDL  91   --    --   121   HDL  45*   --    --   48*   TRIG  128   --    --   99   ALT   --   21   --    --    CR   --   0.83   --    --    GFRESTIMATED   --   74   --    --    GFRESTBLACK   --   90   --    --    POTASSIUM   --   4.2   --    --    TSH  5.22*   --   3.68  2.36        Patient under age 50, mutual decision reflected in health maintenance.      Pertinent mammograms are reviewed under the imaging tab.  History of abnormal Pap smear:   NO - age 30- 65 PAP every 3 years recommended  Last 3 Pap Results:   PAP (no units)   Date Value   02/01/2016 NIL   07/29/2010 NIL   06/06/2008 NIL       Reviewed and updated as needed this visit by clinical staff  Allergies  Meds  Problems         Reviewed and updated as needed this visit by Provider  Allergies  Meds  Problems          Past Medical History:   Diagnosis Date     Gestational diabetes      Miscarriage 7/2010     Other ear problems     YOUNG CHILD     Thyroid disease     thyroid nodule      Past Surgical History:   Procedure Laterality Date     GYN SURGERY      C section     HERNIORRHAPHY INGUINAL  10/26/2011    Procedure:HERNIORRHAPHY INGUINAL; Right Inguinal Herniorrhapy with Mesh Placement; Surgeon:ABBIE SHIELDS; Location:[facility]     HERNIORRHAPHY VENTRAL N/A 1/4/2017    Procedure: HERNIORRHAPHY VENTRAL;  Surgeon: Stevie Bangura MD;  Location: [facility]     PE TUBES       PHACOEMULSIFICATION WITH STANDARD INTRAOCULAR LENS IMPLANT Left 5/7/2018    Procedure: PHACOEMULSIFICATION WITH STANDARD INTRAOCULAR LENS IMPLANT;  Left cataract removal with implant;  Surgeon: Sukh Mackey MD;  Location: [facility]     THYROIDECTOMY Left  "3/8/2016    Procedure: THYROIDECTOMY;  Surgeon: Anitra Ornelas MD;  [facility]     Obstetric History       T3      L1     SAB0   TAB0   Ectopic0   Multiple0   Live Births0       # Outcome Date GA Lbr Ino/2nd Weight Sex Delivery Anes PTL Lv   6 Term 2011 40w0d 14:49 8 lb 12 oz (3.969 kg)           Apgar1:  8                Apgar5: 8   5 AB 2010 5w0d          4 Para   04:40         3 Para   03:00         2 Term            1 Term                   ROS:  CONSTITUTIONAL: NEGATIVE for fever, chills, change in weight  INTEGUMENTARU/SKIN: NEGATIVE for worrisome rashes, moles or lesions  EYES: NEGATIVE for vision changes or irritation  ENT: NEGATIVE for ear, mouth and throat problems  RESP: NEGATIVE for significant cough or SOB  BREAST: NEGATIVE for masses, tenderness or discharge  CV: NEGATIVE for chest pain, palpitations or peripheral edema  GI: NEGATIVE for nausea, abdominal pain, heartburn, or change in bowel habits  : NEGATIVE for unusual urinary or vaginal symptoms. Periods are regular.  MUSCULOSKELETAL: NEGATIVE for significant arthralgias or myalgia  NEURO: NEGATIVE for weakness, dizziness or paresthesias  ENDOCRINE: NEGATIVE for temperature intolerance, skin/hair changes  PSYCHIATRIC: NEGATIVE for changes in mood or affect    OBJECTIVE:   BP (!) 144/94  Pulse 73  Temp 97.5  F (36.4  C) (Tympanic)  Ht 5' 7.5\" (1.715 m)  Wt 215 lb 8 oz (97.8 kg)  Breastfeeding? No  BMI 33.25 kg/m2  EXAM:  GENERAL: healthy, alert and no distress  EYES: Eyes grossly normal to inspection, PERRL and conjunctivae and sclerae normal  HENT: ear canals and TM's normal, nose and mouth without ulcers or lesions  NECK: no adenopathy, no asymmetry, masses, or scars and thyroid normal to palpation  RESP: lungs clear to auscultation - no rales, rhonchi or wheezes  CV: regular rate and rhythm, normal S1 S2, no S3 or S4, no murmur, click or rub, no peripheral edema and peripheral pulses " "strong  ABDOMEN: soft, nontender, no hepatosplenomegaly, no masses and bowel sounds normal  MS: no gross musculoskeletal defects noted, no edema  SKIN: no suspicious lesions or rashes  NEURO: Normal strength and tone, mentation intact and speech normal  PSYCH: mentation appears normal, affect normal/bright    ASSESSMENT/PLAN:   1. Encounter for routine adult health examination without abnormal findings       2. Seasonal affective disorder (H)  Controlled.    - buPROPion (WELLBUTRIN XL) 150 MG 24 hr tablet; Take 1 tablet (150 mg) by mouth every morning  Dispense: 90 tablet; Refill: 3    3. Major depressive disorder, recurrent episode, mild (H)  Stable  - buPROPion (WELLBUTRIN XL) 150 MG 24 hr tablet; Take 1 tablet (150 mg) by mouth every morning  Dispense: 90 tablet; Refill: 3    4. Attention deficit disorder of adult     - buPROPion (WELLBUTRIN XL) 150 MG 24 hr tablet; Take 1 tablet (150 mg) by mouth every morning  Dispense: 90 tablet; Refill: 3    5. Thyroid nodule     - TSH  - T4 FREE    6. BMI 33.0-33.9,adult   Starting work out program.    - Glucose    7. CARDIOVASCULAR SCREENING; LDL GOAL LESS THAN 160     - Lipid panel reflex to direct LDL Fasting    8. Need for prophylactic vaccination and inoculation against influenza     - buPROPion (WELLBUTRIN XL) 150 MG 24 hr tablet; Take 1 tablet (150 mg) by mouth every morning  Dispense: 90 tablet; Refill: 3    9. Family history of diabetes mellitus     - Hemoglobin A1c    10. S/P thyroidectomy     - TSH  - T4 FREE    COUNSELING:   Reviewed preventive health counseling, as reflected in patient instructions       Regular exercise       Healthy diet/nutrition       Vision screening         reports that she has never smoked. She has never used smokeless tobacco.    Estimated body mass index is 33.25 kg/(m^2) as calculated from the following:    Height as of this encounter: 5' 7.5\" (1.715 m).    Weight as of this encounter: 215 lb 8 oz (97.8 kg).   Weight management plan: " Discussed healthy diet and exercise guidelines and patient will follow up in 12 months in clinic to re-evaluate.    Counseling Resources:  ATP IV Guidelines  Pooled Cohorts Equation Calculator  Breast Cancer Risk Calculator  FRAX Risk Assessment  ICSI Preventive Guidelines  Dietary Guidelines for Americans, 2010  USDA's MyPlate  ASA Prophylaxis  Lung CA Screening    Nano Quinonez NP  [facility]

## 2018-06-22 ASSESSMENT — ANXIETY QUESTIONNAIRES: GAD7 TOTAL SCORE: 0

## 2018-06-22 ASSESSMENT — PATIENT HEALTH QUESTIONNAIRE - PHQ9: SUM OF ALL RESPONSES TO PHQ QUESTIONS 1-9: 0

## 2018-06-25 PROBLEM — Z90.89 S/P THYROIDECTOMY: Status: ACTIVE | Noted: 2018-06-25

## 2018-06-25 PROBLEM — E89.0 S/P THYROIDECTOMY: Status: ACTIVE | Noted: 2018-06-25

## 2018-06-25 PROBLEM — Z98.890 S/P THYROIDECTOMY: Status: ACTIVE | Noted: 2018-06-25

## 2018-06-27 NOTE — H&P
[facility]  TOTAL E[facility] CARE  [address] [place]  [facility] [address]  [phone]  Dept: [phone]    OPHTHALMOLOGY PRE-OPERATIVE  HISTORY AND PHYSICAL    DATE OF H/P:  2018    DATE OF SURGERY:  2018  PROCEDURE:  Procedure(s):  PHACOEMULSIFICATION WITH STANDARD INTRAOCULAR LENS IMPLANT, Right Eye  LENS IMPLANT:  ZCB00 +21.0  REFRACTIVE GOAL:  PL Sph  SURGEON:  Sukh Mackey MD    ANESTHESIA:  TOPICAL / MAC    OR CASE REQUIREMENTS:  S/p LASIK ou    DEMOGRAPHICS:  Demographic Information on Consuelo Medellin:    Consuelo Medellin  Gender: female  : 1970  [address] [facility] [address] [phone] (home) NONE (work)    Medical Record: [id number]  Social Security Number: [id number]  Pharmacy:    [facility] [address] [facility] [address] [facility] - - [facility] [address] [facility]        Primary Care Provider: Nano Quinonez    Parent's names are: Data Unavailable (mother) and Data Unavailable (father).    Insurance: Payor: BCBS / [facility] / Product Type: Indemnity /     OCULAR HISTORY:  Cataracts, s/p IOL OS.  S/p LASIK.    HISTORIES:  Past Medical History:   Diagnosis Date     Gestational diabetes      Miscarriage 2010     Other ear problems     YOUNG CHILD     Thyroid disease     thyroid nodule       Past Surgical History:   Procedure Laterality Date     GYN SURGERY      C section     HERNIORRHAPHY INGUINAL  10/26/2011    Procedure:HERNIORRHAPHY INGUINAL; Right Inguinal Herniorrhapy with Mesh Placement; Surgeon:ABBIE SHIELDS; Location:[facility]     HERNIORRHAPHY VENTRAL N/A 2017    Procedure: HERNIORRHAPHY VENTRAL;  Surgeon: Stevie Bangura MD;  Location: [facility]     PE TUBES       PHACOEMULSIFICATION WITH STANDARD INTRAOCULAR LENS IMPLANT Left 2018    Procedure: PHACOEMULSIFICATION WITH  STANDARD INTRAOCULAR LENS IMPLANT;  Left cataract removal with implant;  Surgeon: Sukh Mackey MD;  Location: [facility]     THYROIDECTOMY Left 3/8/2016    Procedure: THYROIDECTOMY;  Surgeon: Anitra Ornelas MD;  Location:  OR       Family History   Problem Relation Age of Onset     HEART DISEASE Mother      arrythmia     Diabetes Father      C.A.D. Father      Coronary Artery Disease Father      C.A.D. Paternal Grandfather      Diabetes Paternal Grandfather        Social History   Substance Use Topics     Smoking status: Never Smoker     Smokeless tobacco: Never Used     Alcohol use Yes      Comment: rare       MEDICATIONS:  No current facility-administered medications for this encounter.      Current Outpatient Prescriptions   Medication Sig     buPROPion (WELLBUTRIN XL) 150 MG 24 hr tablet Take 1 tablet (150 mg) by mouth every morning       ALLERGIES:     Allergies   Allergen Reactions     Nkda [No Known Drug Allergies]      Wasps [Hornets] Nausea and Vomiting and Hives       PERTINENT SYSTEMS REVIEW:    1. No - Do you have a history of heart attack, stroke, stent, bypass or surgery on an artery in the head, neck, heart or legs?  2. No - Do you ever have any pain or discomfort in your chest?  3. No - Do you have a history of  Heart Failure?  4. No - Are you troubled by shortness of breath when walking: On the level, up a slight hill or at night?  5. No - Do you currently have a cold, bronchitis or other respiratory infection?  6. No - Do you have a cough, shortness of breath or wheezing?  7. No - Do you sometimes get pains in the calves of your legs when you walk?  8. No - Do you or anyone in your family have previous history of blood clots?  9. No - Do you or does anyone in your family have a serious bleeding problem such as prolonged bleeding following surgeries or cuts?  10. No - Have you ever had problems with anemia or been told to take iron pills?  11. No - Have you had any abnormal blood  loss such as black, tarry or bloody stools, or abnormal vaginal bleeding?  12. No - Have you ever had a blood transfusion?  13. Yes: post op nausea - Have you or any of your relatives ever had problems with anesthesia?  14. No - Do you have sleep apnea, excessive snoring or daytime drowsiness?  15. No - Do you have any prosthetic heart valves?  16. No - Do you have prosthetic joints?    EXAMINATION:  Vitals were reviewed                     Vison:  Va, right - 20/25;   BAT, right - 20/60;  HEENT:  Cataract, otherwise unremarkable.  LUNGS:  Clear  CV:  Regular rate and rhythm without murmur  ABD:  Soft and nontender  NEURO:  Alert and nonfocal    IMPRESSION:  Patient cleared for ophthalmic surgery.  Low risk with monitored, light sedation.  I have assessed the patient's DVT risk, and no additional orders necessary.    PLAN:  Procedure(s):  PHACOEMULSIFICATION WITH STANDARD INTRAOCULAR LENS IMPLANT, Right Eye      Sukh Mackey MD

## 2018-07-01 ENCOUNTER — ANESTHESIA EVENT (OUTPATIENT)
Dept: SURGERY | Facility: CLINIC | Age: 48
End: 2018-07-01
Payer: COMMERCIAL

## 2018-07-02 NOTE — ANESTHESIA PREPROCEDURE EVALUATION
Anesthesia Evaluation     . Pt has had prior anesthetic. Type: General    No history of anesthetic complications          ROS/MED HX    ENT/Pulmonary: Comment: Other ear problems      Neurologic:  - neg neurologic ROS     Cardiovascular:  - neg cardiovascular ROS   (+) ----. : . . . :. . Previous cardiac testing date:results:date: results:ECG reviewed date:2-26-17 results:Sinus Rhythm   Low voltage in precordial leads.    -Incomplete right bundle branch block.     ABNORMAL date: results:          METS/Exercise Tolerance:  >4 METS   Hematologic:  - neg hematologic  ROS       Musculoskeletal:  - neg musculoskeletal ROS       GI/Hepatic:  - neg GI/hepatic ROS       Renal/Genitourinary:  - ROS Renal section negative       Endo:     (+) thyroid problem  Thyroid disease - Other Thyroid nodule, Obesity, .      Psychiatric:     (+) psychiatric history depression and other (comment) (SAD; ADD)      Infectious Disease:  - neg infectious disease ROS       Malignancy:      - no malignancy   Other:    - neg other ROS                 Physical Exam  Normal systems: cardiovascular, pulmonary and dental    Airway   Mallampati: I  TM distance: >3 FB  Neck ROM: full    Dental     Cardiovascular       Pulmonary                     Anesthesia Plan      History & Physical Review  History and physical reviewed and following examination; no interval change.    ASA Status:  2 .    NPO Status:  > 6 hours    Plan for MAC Reason for MAC:  Deep or markedly invasive procedure (G8)  PONV prophylaxis:  Ondansetron (or other 5HT-3) and Dexamethasone or Solumedrol       Postoperative Care  Postoperative pain management:  IV analgesics, Oral pain medications and Multi-modal analgesia.      Consents  Anesthetic plan, risks, benefits and alternatives discussed with:  Patient..                          .

## 2018-07-09 ENCOUNTER — ANESTHESIA (OUTPATIENT)
Dept: SURGERY | Facility: CLINIC | Age: 48
End: 2018-07-09
Payer: COMMERCIAL

## 2018-07-09 ENCOUNTER — HOSPITAL ENCOUNTER (OUTPATIENT)
Facility: CLINIC | Age: 48
Discharge: HOME | End: 2018-07-09
Attending: OPHTHALMOLOGY | Admitting: OPHTHALMOLOGY
Payer: COMMERCIAL

## 2018-07-09 ENCOUNTER — SURGERY (OUTPATIENT)
Age: 48
End: 2018-07-09

## 2018-07-09 VITALS
DIASTOLIC BLOOD PRESSURE: 80 MMHG | WEIGHT: 215 LBS | OXYGEN SATURATION: 98 % | SYSTOLIC BLOOD PRESSURE: 127 MMHG | RESPIRATION RATE: 20 BRPM | HEIGHT: 67 IN | BODY MASS INDEX: 33.74 KG/M2 | HEART RATE: 78 BPM | TEMPERATURE: 98.5 F

## 2018-07-09 LAB — HCG UR QL: NEGATIVE

## 2018-07-09 PROCEDURE — 37000012 ZZH ANESTHESIA CATARACT PACKAGE: Performed by: OPHTHALMOLOGY

## 2018-07-09 PROCEDURE — 71000022 ZZH RECOVERY CATRACT PACKAGE: Performed by: OPHTHALMOLOGY

## 2018-07-09 PROCEDURE — 81025 URINE PREGNANCY TEST: CPT | Performed by: OPHTHALMOLOGY

## 2018-07-09 PROCEDURE — 25000125 ZZHC RX 250: Performed by: NURSE ANESTHETIST, CERTIFIED REGISTERED

## 2018-07-09 PROCEDURE — 36000025 ZZH CATARACT SURGICAL PACKAGE: Performed by: OPHTHALMOLOGY

## 2018-07-09 PROCEDURE — V2632 POST CHMBR INTRAOCULAR LENS: HCPCS | Performed by: OPHTHALMOLOGY

## 2018-07-09 PROCEDURE — 25000125 ZZHC RX 250: Performed by: OPHTHALMOLOGY

## 2018-07-09 PROCEDURE — 25000128 H RX IP 250 OP 636: Performed by: NURSE ANESTHETIST, CERTIFIED REGISTERED

## 2018-07-09 PROCEDURE — 25000128 H RX IP 250 OP 636: Performed by: OPHTHALMOLOGY

## 2018-07-09 DEVICE — EYE IMP IOL AMO PCL TECNIS ZCB00 21.0: Type: IMPLANTABLE DEVICE | Site: EYE | Status: FUNCTIONAL

## 2018-07-09 RX ORDER — BALANCED SALT SOLUTION 6.4; .75; .48; .3; 3.9; 1.7 MG/ML; MG/ML; MG/ML; MG/ML; MG/ML; MG/ML
SOLUTION OPHTHALMIC PRN
Status: DISCONTINUED | OUTPATIENT
Start: 2018-07-09 | End: 2018-07-09 | Stop reason: HOSPADM

## 2018-07-09 RX ORDER — TROPICAMIDE 10 MG/ML
1 SOLUTION/ DROPS OPHTHALMIC
Status: COMPLETED | OUTPATIENT
Start: 2018-07-09 | End: 2018-07-09

## 2018-07-09 RX ORDER — CYCLOPENTOLATE HYDROCHLORIDE 10 MG/ML
1 SOLUTION/ DROPS OPHTHALMIC
Status: COMPLETED | OUTPATIENT
Start: 2018-07-09 | End: 2018-07-09

## 2018-07-09 RX ORDER — PHENYLEPHRINE HYDROCHLORIDE 25 MG/ML
1 SOLUTION/ DROPS OPHTHALMIC
Status: COMPLETED | OUTPATIENT
Start: 2018-07-09 | End: 2018-07-09

## 2018-07-09 RX ORDER — PROPARACAINE HYDROCHLORIDE 5 MG/ML
SOLUTION/ DROPS OPHTHALMIC PRN
Status: DISCONTINUED | OUTPATIENT
Start: 2018-07-09 | End: 2018-07-09 | Stop reason: HOSPADM

## 2018-07-09 RX ORDER — SODIUM CHLORIDE, SODIUM LACTATE, POTASSIUM CHLORIDE, CALCIUM CHLORIDE 600; 310; 30; 20 MG/100ML; MG/100ML; MG/100ML; MG/100ML
INJECTION, SOLUTION INTRAVENOUS CONTINUOUS
Status: DISCONTINUED | OUTPATIENT
Start: 2018-07-09 | End: 2018-07-09 | Stop reason: HOSPADM

## 2018-07-09 RX ORDER — LIDOCAINE 40 MG/G
CREAM TOPICAL
Status: DISCONTINUED | OUTPATIENT
Start: 2018-07-09 | End: 2018-07-09 | Stop reason: HOSPADM

## 2018-07-09 RX ADMIN — PHENYLEPHRINE HYDROCHLORIDE 1 DROP: 25 SOLUTION/ DROPS OPHTHALMIC at 12:47

## 2018-07-09 RX ADMIN — PHENYLEPHRINE HYDROCHLORIDE 1 DROP: 25 SOLUTION/ DROPS OPHTHALMIC at 12:46

## 2018-07-09 RX ADMIN — TROPICAMIDE 1 DROP: 10 SOLUTION/ DROPS OPHTHALMIC at 12:48

## 2018-07-09 RX ADMIN — BALANCED SALT SOLUTION 100 ML: 6.4; .75; .48; .3; 3.9; 1.7 SOLUTION OPHTHALMIC at 13:02

## 2018-07-09 RX ADMIN — TROPICAMIDE 1 DROP: 10 SOLUTION/ DROPS OPHTHALMIC at 12:40

## 2018-07-09 RX ADMIN — PHENYLEPHRINE HYDROCHLORIDE 1 DROP: 25 SOLUTION/ DROPS OPHTHALMIC at 12:40

## 2018-07-09 RX ADMIN — CYCLOPENTOLATE HYDROCHLORIDE 1 DROP: 10 SOLUTION/ DROPS OPHTHALMIC at 12:44

## 2018-07-09 RX ADMIN — SODIUM CHLORIDE, POTASSIUM CHLORIDE, SODIUM LACTATE AND CALCIUM CHLORIDE: 600; 310; 30; 20 INJECTION, SOLUTION INTRAVENOUS at 12:46

## 2018-07-09 RX ADMIN — PROPARACAINE HYDROCHLORIDE 2 DROP: 5 SOLUTION/ DROPS OPHTHALMIC at 13:03

## 2018-07-09 RX ADMIN — LIDOCAINE HYDROCHLORIDE 1 ML: 10 INJECTION, SOLUTION EPIDURAL; INFILTRATION; INTRACAUDAL; PERINEURAL at 12:47

## 2018-07-09 RX ADMIN — MIDAZOLAM 2 MG: 1 INJECTION INTRAMUSCULAR; INTRAVENOUS at 12:56

## 2018-07-09 RX ADMIN — Medication 1 ML GIVEN: at 13:03

## 2018-07-09 RX ADMIN — CYCLOPENTOLATE HYDROCHLORIDE 1 DROP: 10 SOLUTION/ DROPS OPHTHALMIC at 12:45

## 2018-07-09 RX ADMIN — EPINEPHRINE 0.6 ML: 1 INJECTION, SOLUTION INTRAMUSCULAR; SUBCUTANEOUS at 13:02

## 2018-07-09 RX ADMIN — MOXIFLOXACIN HYDROCHLORIDE 0.8 ML: 5 SOLUTION/ DROPS OPHTHALMIC at 13:03

## 2018-07-09 RX ADMIN — LIDOCAINE HYDROCHLORIDE 1 TUBE: 20 JELLY TOPICAL at 13:02

## 2018-07-09 RX ADMIN — TROPICAMIDE 1 DROP: 10 SOLUTION/ DROPS OPHTHALMIC at 12:44

## 2018-07-09 RX ADMIN — CYCLOPENTOLATE HYDROCHLORIDE 1 DROP: 10 SOLUTION/ DROPS OPHTHALMIC at 12:40

## 2018-07-09 NOTE — ANESTHESIA POSTPROCEDURE EVALUATION
Patient: Consuelo Medellin    Procedure(s):  Right Cataract Removal with Implant - Wound Class: I-Clean    Diagnosis:cataract  Diagnosis Additional Information: No value filed.    Anesthesia Type:  MAC    Note:  Anesthesia Post Evaluation    Patient location during evaluation: Phase 2 and Bedside  Patient participation: Able to fully participate in evaluation  Level of consciousness: awake and alert  Pain management: adequate  Airway patency: patent  Cardiovascular status: acceptable and hemodynamically stable  Respiratory status: acceptable and room air  Hydration status: acceptable  PONV: none     Anesthetic complications: None          Last vitals:  Vitals:    07/09/18 1224   BP: (!) 139/99   Pulse: 74   Resp: 20   Temp: 36.9  C (98.5  F)         Electronically Signed By: MICHAEL Rivera CRNA  July 9, 2018  1:14 PM

## 2018-07-09 NOTE — ANESTHESIA CARE TRANSFER NOTE
Patient: Consuelo Medellin    Procedure(s):  Right Cataract Removal with Implant - Wound Class: I-Clean    Diagnosis: cataract  Diagnosis Additional Information: No value filed.    Anesthesia Type:   MAC     Note:  Airway :Room Air  Patient transferred to:Phase II  Handoff Report: Identifed the Patient, Identified the Reponsible Provider, Reviewed the pertinent medical history, Discussed the surgical course, Reviewed Intra-OP anesthesia mangement and issues during anesthesia, Set expectations for post-procedure period and Allowed opportunity for questions and acknowledgement of understanding      Vitals: (Last set prior to Anesthesia Care Transfer)    CRNA VITALS  7/9/2018 1243 - 7/9/2018 1314      7/9/2018             Pulse: 61    SpO2: 94 %                Electronically Signed By: MICHAEL Rivera CRNA  July 9, 2018  1:14 PM

## 2018-07-09 NOTE — OP NOTE
OPHTHALMOLOGY OPERATIVE NOTE    PATIENT: Consuelo Medellin  DATE OF SURGERY: 7/9/2018  PREOPERATIVE DIAGNOSIS:  Senile Nuclear Cataract, Right eye  POSTOPERATIVE DIAGNOSIS:  Senile Nuclear Cataract, Right eye  OPERATIVE PROCEDURE:  Phacoemulsification with placement of intraocular lens  SURGEON:  Sukh Mackey MD  ANESTHESIA:  Topical / MAC  EBL:  None  SPECIMENS:  None  COMPLICATIONS:  None    PROCEDURE:  The patient was brought to the operating room at [facility].  The right eye was prepped and draped in the usual fashion for cataract surgery.  A wire lid speculum was inserted.  A super sharp blade was used to make a paracentesis at the 11 O'clock position.  The super sharp blade was used to make a partial thickness temporal groove, which was 3 mm in length.  0.8 mL of non-preserved epi-Shugarcaine was injected into the anterior chamber.  Viscoelastic was used to inflate the anterior chamber through a cannula.  A 2.5 mm microkeratome was used to make a temporal clear corneal incision in a two-plane fashion.  A cystotome needle and forceps were used to make a capsulorrhexis.  Hydrodissection and hydrodelineation were performed with Balance Salt Solution.  The lens was then phacoemulsified and removed without complications.  The cortical material was removed with bimanual irrigation and aspiration.  The capsular bag was filled with viscoelastic.  A posterior chamber intraocular lens, preselected and recorded, was folded and inserted into the capsular bag.  The viscoelastic was removed with the irrigation and aspiration tip.  Balanced Salt Solution with Vigamox, 150mg/0.1mL, was used to refill the anterior chamber.  The wounds were checked for water tightness and required no suture.  The wire lid speculum was removed.  The patient's right eye was cleaned and a drop of each post-operative drop was placed, followed by a rivera shield.  The patient tolerated the procedure well, and there were no  complications.      Sukh Mackey MD

## 2018-09-24 DIAGNOSIS — E89.0 S/P THYROIDECTOMY: ICD-10-CM

## 2018-09-24 DIAGNOSIS — E04.1 THYROID NODULE: ICD-10-CM

## 2018-09-24 LAB — TSH SERPL DL<=0.005 MIU/L-ACNC: 4.68 MU/L (ref 0.4–4)

## 2018-09-24 PROCEDURE — 36415 COLL VENOUS BLD VENIPUNCTURE: CPT | Performed by: NURSE PRACTITIONER

## 2018-09-24 PROCEDURE — 84443 ASSAY THYROID STIM HORMONE: CPT | Performed by: NURSE PRACTITIONER

## 2018-11-05 ENCOUNTER — OFFICE VISIT (OUTPATIENT)
Dept: FAMILY MEDICINE | Facility: CLINIC | Age: 48
End: 2018-11-05
Payer: COMMERCIAL

## 2018-11-05 VITALS
BODY MASS INDEX: 28.49 KG/M2 | WEIGHT: 188 LBS | RESPIRATION RATE: 14 BRPM | OXYGEN SATURATION: 96 % | SYSTOLIC BLOOD PRESSURE: 120 MMHG | DIASTOLIC BLOOD PRESSURE: 84 MMHG | TEMPERATURE: 96.3 F | HEART RATE: 73 BPM | HEIGHT: 68 IN

## 2018-11-05 DIAGNOSIS — J02.0 ACUTE STREPTOCOCCAL PHARYNGITIS: ICD-10-CM

## 2018-11-05 DIAGNOSIS — J02.9 SORE THROAT: Primary | ICD-10-CM

## 2018-11-05 LAB
DEPRECATED S PYO AG THROAT QL EIA: ABNORMAL
SPECIMEN SOURCE: ABNORMAL

## 2018-11-05 PROCEDURE — 87880 STREP A ASSAY W/OPTIC: CPT | Performed by: FAMILY MEDICINE

## 2018-11-05 PROCEDURE — 99213 OFFICE O/P EST LOW 20 MIN: CPT | Performed by: FAMILY MEDICINE

## 2018-11-05 RX ORDER — PENICILLIN V POTASSIUM 500 MG/1
500 TABLET, FILM COATED ORAL 2 TIMES DAILY
Qty: 20 TABLET | Refills: 0 | Status: SHIPPED | OUTPATIENT
Start: 2018-11-05 | End: 2018-11-16

## 2018-11-05 NOTE — PROGRESS NOTES
SUBJECTIVE:   Consuelo Medellin is a 48 year old female who presents to clinic today for the following health issues:      ENT Symptoms             Symptoms: cc Present Absent Comment   Fever/Chills   x    Fatigue   x    Muscle Aches   x    Eye Irritation   x    Sneezing   x    Nasal Kash/Drg   x    Sinus Pressure/Pain   x    Loss of smell   x    Dental pain   x    Sore Throat  x     Swollen Glands   x    Ear Pain/Fullness   x    Cough   x    Wheeze   x    Chest Pain   x    Shortness of breath   x    Rash   x    Other         Symptom duration:  5 days ago patient had flu sx before now only has the sore throat    Symptom severity:  moderate   Treatments tried:  tylenol    Contacts:           Patient here for sore throat started a few days ago denies any fevers. Sore throat has been quite severe.     Problem list and histories reviewed & adjusted, as indicated.  Additional history: as documented    Patient Active Problem List   Diagnosis     CARDIOVASCULAR SCREENING; LDL GOAL LESS THAN 160     Blood type O+     Obesity     Attention deficit disorder of adult     Major depressive disorder, recurrent episode, mild (H)     Thyroid nodule     Seasonal affective disorder (H)     Family history of diabetes mellitus     S/P thyroidectomy     Past Surgical History:   Procedure Laterality Date     GYN SURGERY      C section     HERNIORRHAPHY INGUINAL  10/26/2011    Procedure:HERNIORRHAPHY INGUINAL; Right Inguinal Herniorrhapy with Mesh Placement; Surgeon:ABBIE SHIELDS; Location:[facility]     HERNIORRHAPHY VENTRAL N/A 1/4/2017    Procedure: HERNIORRHAPHY VENTRAL;  Surgeon: Stevie Bangura MD;  Location: [facility] OR     PE TUBES       PHACOEMULSIFICATION WITH STANDARD INTRAOCULAR LENS IMPLANT Left 5/7/2018    Procedure: PHACOEMULSIFICATION WITH STANDARD INTRAOCULAR LENS IMPLANT;  Left cataract removal with implant;  Surgeon: Sukh Mackey MD;  Location: [facility] OR     PHACOEMULSIFICATION WITH STANDARD INTRAOCULAR  "LENS IMPLANT Right 7/9/2018    Procedure: PHACOEMULSIFICATION WITH STANDARD INTRAOCULAR LENS IMPLANT;  Right Cataract Removal with Implant;  Surgeon: Sukh Mackey MD;  Location: [facility] OR     THYROIDECTOMY Left 3/8/2016    Procedure: THYROIDECTOMY;  Surgeon: Anitra Ornelas MD;  Location:  OR       Social History   Substance Use Topics     Smoking status: Never Smoker     Smokeless tobacco: Never Used     Alcohol use Yes      Comment: rare     Family History   Problem Relation Age of Onset     HEART DISEASE Mother      arrythmia     Diabetes Father      C.A.D. Father      Coronary Artery Disease Father      C.A.D. Paternal Grandfather      Diabetes Paternal Grandfather          Current Outpatient Prescriptions   Medication Sig Dispense Refill     buPROPion (WELLBUTRIN XL) 150 MG 24 hr tablet Take 1 tablet (150 mg) by mouth every morning 90 tablet 3     penicillin V potassium (VEETID) 500 MG tablet Take 1 tablet (500 mg) by mouth 2 times daily 20 tablet 0     Allergies   Allergen Reactions     Nkda [No Known Drug Allergies]      Wasps [Hornets] Nausea and Vomiting and Hives       Reviewed and updated as needed this visit by clinical staff  Tobacco  Allergies  Meds  Med Hx  Surg Hx  Fam Hx  Soc Hx      Reviewed and updated as needed this visit by Provider         ROS:  Constitutional, HEENT, cardiovascular, pulmonary, gi and gu systems are negative, except as otherwise noted.    OBJECTIVE:     /84  Pulse 73  Temp 96.3  F (35.7  C) (Tympanic)  Resp 14  Ht 5' 7.5\" (1.715 m)  Wt 188 lb (85.3 kg)  SpO2 96%  BMI 29.01 kg/m2  Body mass index is 29.01 kg/(m^2).  GENERAL: healthy, alert and no distress  HENT: normal cephalic/atraumatic, ear canals and TM's normal, nose and mouth without ulcers or lesions, oropharynx clear, oral mucous membranes moist, tonsillar hypertrophy and tonsillar erythema  NECK: no adenopathy, no asymmetry, masses, or scars and thyroid normal to palpation  RESP: lungs " clear to auscultation - no rales, rhonchi or wheezes  CV: regular rate and rhythm, normal S1 S2, no S3 or S4, no murmur, click or rub, no peripheral edema and peripheral pulses strong  ABDOMEN: soft, nontender, no hepatosplenomegaly, no masses and bowel sounds normal  MS: no gross musculoskeletal defects noted, no edema    Diagnostic Test Results:  Results for orders placed or performed in visit on 11/05/18 (from the past 24 hour(s))   Rapid strep screen   Result Value Ref Range    Specimen Description Throat     Rapid Strep A Screen (A)      POSITIVE: Group A Streptococcal antigen detected by immunoassay.       ASSESSMENT/PLAN:   1. Sore throat  Strep positive   - Rapid strep screen    2. Acute streptococcal pharyngitis  Antibiotics faxed   - penicillin V potassium (VEETID) 500 MG tablet; Take 1 tablet (500 mg) by mouth 2 times daily  Dispense: 20 tablet; Refill: 0    FUTURE APPOINTMENTS:       - Follow-up visit as needed.  Patient Instructions         Thank you for choosing [facility].  You may be receiving a survey in the mail from [employer] regarding your visit today.  Please take a few minutes to complete and return the survey to let us know how we are doing.      If you have questions or concerns, please contact us via [employer] or you can contact your care team at [phone].    Our Clinic hours are:  Monday 6:40 am  to 7:00 pm  Tuesday -Friday 6:40 am to 5:00 pm    The [place] outpatient lab hours are:  Monday - Friday 6:10 am to 4:45 pm  Saturdays 7:00 am to 11:00 am  Appointments are required, call [phone]    If you have clinical questions after hours or would like to schedule an appointment,  call the clinic at [phone].  Pharyngitis: Strep (Confirmed)    You have had a positive test for strep throat. Strep throat is a contagious illness. It is spread by coughing, kissing or by touching others after touching your mouth or nose. Symptoms include throat pain that is worse with swallowing,  aching all over, headache, and fever. It is treated with antibiotic medicine. This should help you start to feel better in 1 to 2 days.  Home care    Rest at home. Drink plenty of fluids to you won't get dehydrated.    No work or school for the first 2 days of taking the antibiotics. After this time, you will not be contagious. You can then return to school or work if you are feeling better.     Take antibiotic medicine for the full 10 days, even if you feel better. This is very important to ensure the infection is treated. It is also important to prevent medicine-resistant germs from developing. If you were given an antibiotic shot, you don't need any more antibiotics.    You may use acetaminophen or ibuprofen to control pain or fever, unless another medicine was prescribed for this. Talk with your healthcare provider before taking these medicines if you have chronic liver or kidney disease. Also talk with your healthcare provider if you have had a stomach ulcer or GI bleeding.    Throat lozenges or sprays help reduce pain. Gargling with warm saltwater will also reduce throat pain. Dissolve 1/2 teaspoon of salt in 1 glass of warm water. This may be useful just before meals.     Soft foods are OK. Don't eat salty or spicy foods.  Follow-up care  Follow up with your healthcare provider or our staff if you don't get better over the next week.  When to seek medical advice  Call your healthcare provider right away if any of these occur:    Fever of 100.4 F (38 C) or higher, or as directed by your healthcare provider    New or worsening ear pain, sinus pain, or headache    Painful lumps in the back of neck    Stiff neck    Lymph nodes getting larger or becoming soft in the middle    You can't swallow liquids or you can't open your mouth wide because of throat pain    Signs of dehydration. These include very dark urine or no urine, sunken eyes, and dizziness.    Trouble breathing or noisy breathing    Muffled  voice    Rash  Prevention  Here are steps you can take to help prevent an infection:    Keep good hand washing habits.    Don t have close contact with people who have sore throats, colds, or other upper respiratory infections.    Don t smoke, and stay away from secondhand smoke.  Date Last Reviewed: 11/1/2017 2000-2017 [employer]. [address] [place] [address], [place] [id number]. All rights reserved. This information is not intended as a substitute for professional medical care. Always follow your healthcare professional's instructions.            Letty Kern MD  [facility]

## 2018-11-05 NOTE — MR AVS SNAPSHOT
After Visit Summary   11/5/2018    Consuelo Medellin    MRN: [id number]           Patient Information     Date Of Birth          1970        Visit Information        Provider Department      11/5/2018 9:20 AM Letty Kern MD [facility]        Today's Diagnoses     Sore throat    -  1    Acute streptococcal pharyngitis          Care Instructions          Thank you for choosing [facility].  You may be receiving a survey in the mail from [facility] regarding your visit today.  Please take a few minutes to complete and return the survey to let us know how we are doing.      If you have questions or concerns, please contact us via [employer] or you can contact your care team at [phone].    Our Clinic hours are:  Monday 6:40 am  to 7:00 pm  Tuesday -Friday 6:40 am to 5:00 pm    The [place] outpatient lab hours are:  Monday - Friday 6:10 am to 4:45 pm  Saturdays 7:00 am to 11:00 am  Appointments are required, call [phone]    If you have clinical questions after hours or would like to schedule an appointment,  call the clinic at [phone].  Pharyngitis: Strep (Confirmed)    You have had a positive test for strep throat. Strep throat is a contagious illness. It is spread by coughing, kissing or by touching others after touching your mouth or nose. Symptoms include throat pain that is worse with swallowing, aching all over, headache, and fever. It is treated with antibiotic medicine. This should help you start to feel better in 1 to 2 days.  Home care    Rest at home. Drink plenty of fluids to you won't get dehydrated.    No work or school for the first 2 days of taking the antibiotics. After this time, you will not be contagious. You can then return to school or work if you are feeling better.     Take antibiotic medicine for the full 10 days, even if you feel better. This is very important to ensure the infection is treated. It is also important to prevent  medicine-resistant germs from developing. If you were given an antibiotic shot, you don't need any more antibiotics.    You may use acetaminophen or ibuprofen to control pain or fever, unless another medicine was prescribed for this. Talk with your healthcare provider before taking these medicines if you have chronic liver or kidney disease. Also talk with your healthcare provider if you have had a stomach ulcer or GI bleeding.    Throat lozenges or sprays help reduce pain. Gargling with warm saltwater will also reduce throat pain. Dissolve 1/2 teaspoon of salt in 1 glass of warm water. This may be useful just before meals.     Soft foods are OK. Don't eat salty or spicy foods.  Follow-up care  Follow up with your healthcare provider or our staff if you don't get better over the next week.  When to seek medical advice  Call your healthcare provider right away if any of these occur:    Fever of 100.4 F (38 C) or higher, or as directed by your healthcare provider    New or worsening ear pain, sinus pain, or headache    Painful lumps in the back of neck    Stiff neck    Lymph nodes getting larger or becoming soft in the middle    You can't swallow liquids or you can't open your mouth wide because of throat pain    Signs of dehydration. These include very dark urine or no urine, sunken eyes, and dizziness.    Trouble breathing or noisy breathing    Muffled voice    Rash  Prevention  Here are steps you can take to help prevent an infection:    Keep good hand washing habits.    Don t have close contact with people who have sore throats, colds, or other upper respiratory infections.    Don t smoke, and stay away from secondhand smoke.  Date Last Reviewed: 11/1/2017 2000-2017 [employer]. [address] [place] [address], [place] [address]. All rights reserved. This information is not intended as a substitute for professional medical care. Always follow your healthcare professional's instructions.                 "Follow-ups after your visit        Follow-up notes from your care team     Return in about 2 weeks (around 11/19/2018), or if symptoms worsen or fail to improve.      Who to contact     If you have questions or need follow up information about today's clinic visit or your schedule please contact [facility] directly at [phone].  Normal or non-critical lab and imaging results will be communicated to you by MyChart, letter or phone within 4 business days after the clinic has received the results. If you do not hear from us within 7 days, please contact the clinic through [employer] or phone. If you have a critical or abnormal lab result, we will notify you by phone as soon as possible.  Submit refill requests through [employer] or call your pharmacy and they will forward the refill request to us. Please allow 3 business days for your refill to be completed.          Additional Information About Your Visit        [employer] Information     [employer] gives you secure access to your electronic health record. If you see a primary care provider, you can also send messages to your care team and make appointments. If you have questions, please call your primary care clinic.  If you do not have a primary care provider, please call [phone] and they will assist you.        Care EveryWhere ID     This is your Care EveryWhere ID. This could be used by other organizations to access your [place] medical records  [id number]        Your Vitals Were     Pulse Temperature Respirations Height Pulse Oximetry BMI (Body Mass Index)    73 96.3  F (35.7  C) (Tympanic) 14 5' 7.5\" (1.715 m) 96% 29.01 kg/m2       Blood Pressure from Last 3 Encounters:   11/05/18 120/84   07/09/18 127/80   06/21/18 (!) 144/94    Weight from Last 3 Encounters:   11/05/18 188 lb (85.3 kg)   07/09/18 215 lb (97.5 kg)   06/21/18 215 lb 8 oz (97.8 kg)              We Performed the Following     Rapid strep screen          Today's Medication Changes    "       These changes are accurate as of 11/5/18  9:52 AM.  If you have any questions, ask your nurse or doctor.               Start taking these medicines.        Dose/Directions    penicillin V potassium 500 MG tablet   Commonly known as:  VEETID   Used for:  Acute streptococcal pharyngitis   Started by:  Letty Kern MD        Dose:  500 mg   Take 1 tablet (500 mg) by mouth 2 times daily   Quantity:  20 tablet   Refills:  0            Where to get your medicines      These medications were sent to [facility] [address]  [address] [facility] [address]     Phone:  [phone]     penicillin V potassium 500 MG tablet                Primary Care Provider Office Phone # Fax #    Nano Shelly Quinonez -[phone]       [address] [facility] [address]        Equal Access to Services     BREEZY PATEL : Hadii mercedes mcgill hadasho Soomaali, waaxda luqadaha, qaybta kaalmada adeegyada, jenny taveras haysherrie haji . So [facility] [phone].    ATENCIÓN: Si habla español, tiene a navarro disposición servicios gratuitos de asistencia lingüística. Esequielame al [phone].    We comply with applicable federal civil rights laws and [place] laws. We do not discriminate on the basis of race, color, national origin, age, disability, sex, sexual orientation, or gender identity.            Thank you!     Thank you for choosing [facility]  for your care. Our goal is always to provide you with excellent care. Hearing back from our patients is one way we can continue to improve our services. Please take a few minutes to complete the written survey that you may receive in the mail after your visit with us. Thank you!             Your Updated Medication List - Protect others around you: Learn how to safely use, store and throw away your medicines at www.disposemymeds.org.          This list is accurate as of 11/5/18  9:52 AM.  Always use your most  recent med list.                   Brand Name Dispense Instructions for use Diagnosis    buPROPion 150 MG 24 hr tablet    WELLBUTRIN XL    90 tablet    Take 1 tablet (150 mg) by mouth every morning    Seasonal affective disorder (H), Major depressive disorder, recurrent episode, mild (H), Attention deficit disorder of adult, Need for prophylactic vaccination and inoculation against influenza       penicillin V potassium 500 MG tablet    VEETID    20 tablet    Take 1 tablet (500 mg) by mouth 2 times daily    Acute streptococcal pharyngitis

## 2018-11-05 NOTE — PATIENT INSTRUCTIONS
Thank you for choosing [facility].  You may be receiving a survey in the mail from [employer] regarding your visit today.  Please take a few minutes to complete and return the survey to let us know how we are doing.      If you have questions or concerns, please contact us via [employer] or you can contact your care team at [phone].    Our Clinic hours are:  Monday 6:40 am  to 7:00 pm  Tuesday -Friday 6:40 am to 5:00 pm    The [place] outpatient lab hours are:  Monday - Friday 6:10 am to 4:45 pm  Saturdays 7:00 am to 11:00 am  Appointments are required, call [phone]    If you have clinical questions after hours or would like to schedule an appointment,  call the clinic at [phone].  Pharyngitis: Strep (Confirmed)    You have had a positive test for strep throat. Strep throat is a contagious illness. It is spread by coughing, kissing or by touching others after touching your mouth or nose. Symptoms include throat pain that is worse with swallowing, aching all over, headache, and fever. It is treated with antibiotic medicine. This should help you start to feel better in 1 to 2 days.  Home care    Rest at home. Drink plenty of fluids to you won't get dehydrated.    No work or school for the first 2 days of taking the antibiotics. After this time, you will not be contagious. You can then return to school or work if you are feeling better.     Take antibiotic medicine for the full 10 days, even if you feel better. This is very important to ensure the infection is treated. It is also important to prevent medicine-resistant germs from developing. If you were given an antibiotic shot, you don't need any more antibiotics.    You may use acetaminophen or ibuprofen to control pain or fever, unless another medicine was prescribed for this. Talk with your healthcare provider before taking these medicines if you have chronic liver or kidney disease. Also talk with your healthcare provider if you have had a  stomach ulcer or GI bleeding.    Throat lozenges or sprays help reduce pain. Gargling with warm saltwater will also reduce throat pain. Dissolve 1/2 teaspoon of salt in 1 glass of warm water. This may be useful just before meals.     Soft foods are OK. Don't eat salty or spicy foods.  Follow-up care  Follow up with your healthcare provider or our staff if you don't get better over the next week.  When to seek medical advice  Call your healthcare provider right away if any of these occur:    Fever of 100.4 F (38 C) or higher, or as directed by your healthcare provider    New or worsening ear pain, sinus pain, or headache    Painful lumps in the back of neck    Stiff neck    Lymph nodes getting larger or becoming soft in the middle    You can't swallow liquids or you can't open your mouth wide because of throat pain    Signs of dehydration. These include very dark urine or no urine, sunken eyes, and dizziness.    Trouble breathing or noisy breathing    Muffled voice    Rash  Prevention  Here are steps you can take to help prevent an infection:    Keep good hand washing habits.    Don t have close contact with people who have sore throats, colds, or other upper respiratory infections.    Don t smoke, and stay away from secondhand smoke.  Date Last Reviewed: 11/1/2017 2000-2017 [employer]. [address] [place] [address], [place] [address]. All rights reserved. This information is not intended as a substitute for professional medical care. Always follow your healthcare professional's instructions.

## 2018-11-16 ENCOUNTER — OFFICE VISIT (OUTPATIENT)
Dept: FAMILY MEDICINE | Facility: CLINIC | Age: 48
End: 2018-11-16
Payer: COMMERCIAL

## 2018-11-16 VITALS
SYSTOLIC BLOOD PRESSURE: 128 MMHG | DIASTOLIC BLOOD PRESSURE: 88 MMHG | OXYGEN SATURATION: 100 % | BODY MASS INDEX: 28.72 KG/M2 | HEIGHT: 67 IN | TEMPERATURE: 97 F | WEIGHT: 183 LBS | HEART RATE: 71 BPM

## 2018-11-16 DIAGNOSIS — E04.1 THYROID NODULE: ICD-10-CM

## 2018-11-16 DIAGNOSIS — Z83.3 FAMILY HISTORY OF DIABETES MELLITUS: ICD-10-CM

## 2018-11-16 DIAGNOSIS — F33.0 MAJOR DEPRESSIVE DISORDER, RECURRENT EPISODE, MILD (H): ICD-10-CM

## 2018-11-16 DIAGNOSIS — L98.9 SKIN LESION: Primary | ICD-10-CM

## 2018-11-16 DIAGNOSIS — Z13.6 CARDIOVASCULAR SCREENING; LDL GOAL LESS THAN 130: ICD-10-CM

## 2018-11-16 PROCEDURE — 87252 VIRUS INOCULATION TISSUE: CPT | Mod: 90 | Performed by: NURSE PRACTITIONER

## 2018-11-16 PROCEDURE — 99000 SPECIMEN HANDLING OFFICE-LAB: CPT | Performed by: NURSE PRACTITIONER

## 2018-11-16 PROCEDURE — 99213 OFFICE O/P EST LOW 20 MIN: CPT | Performed by: NURSE PRACTITIONER

## 2018-11-16 NOTE — PATIENT INSTRUCTIONS
Discussed keeping area clean and dry - cleanse with dial soap and water.  May apply Bacitracin as needed daily until healed.    Viral culture done today - we will call you with results of this.    Return to clinic if new skin lesions occur to evaluated and for possible testing.    Follow up as problems arise.    KATY Villafana

## 2018-11-16 NOTE — PROGRESS NOTES
SUBJECTIVE:   Consuelo Medellin is a 48 year old female who presents to clinic today for the following health issues:    Derm Problem         Description (location/character/radiation): 2 weeks ago she had flu-like symptoms (body aches, cold, chills, vomiting). She was diagnosed with strep 11/5/18. Tuesday 11/6/18 she felt a sore on her bottom. The sores are round and itchy. She has been using neosporin on it. Pt states she went to her all natural doctor and that dr asked her if she ever considered herpes. Pt states she did not and was in a monogamous relationship.        No concern for STD.  Skin lesion without pain, burning or redness.    Some itching reported.  Healing over the past few days.    Also just lost 30# and feels good on new eating program.    Would like labs rechecked.      Hypothyroidism Follow-up      Since last visit, patient describes the following symptoms: Weight stable, no hair loss, no skin changes, no constipation, no loose stools      Problem list and histories reviewed & adjusted, as indicated.  Additional history: as documented    Patient Active Problem List   Diagnosis     CARDIOVASCULAR SCREENING; LDL GOAL LESS THAN 160     Blood type O+     Attention deficit disorder of adult     Major depressive disorder, recurrent episode, mild (H)     Thyroid nodule     Seasonal affective disorder (H)     Family history of diabetes mellitus     S/P thyroidectomy     BMI 28.0-28.9,adult     Past Surgical History:   Procedure Laterality Date     GYN SURGERY      C section     HERNIORRHAPHY INGUINAL  10/26/2011    Procedure:HERNIORRHAPHY INGUINAL; Right Inguinal Herniorrhapy with Mesh Placement; Surgeon:ABBIE SHIELDS; Location:[facility]     HERNIORRHAPHY VENTRAL N/A 1/4/2017    Procedure: HERNIORRHAPHY VENTRAL;  Surgeon: Stevie Bangura MD;  Location: [facility]     PE TUBES       PHACOEMULSIFICATION WITH STANDARD INTRAOCULAR LENS IMPLANT Left 5/7/2018    Procedure: PHACOEMULSIFICATION WITH  STANDARD INTRAOCULAR LENS IMPLANT;  Left cataract removal with implant;  Surgeon: Sukh Mackey MD;  Location: [facility]     PHACOEMULSIFICATION WITH STANDARD INTRAOCULAR LENS IMPLANT Right 7/9/2018    Procedure: PHACOEMULSIFICATION WITH STANDARD INTRAOCULAR LENS IMPLANT;  Right Cataract Removal with Implant;  Surgeon: Sukh Mackey MD;  Location: [facility]     THYROIDECTOMY Left 3/8/2016    Procedure: THYROIDECTOMY;  Surgeon: Anitra Ornelas MD;  Location:  OR       Social History   Substance Use Topics     Smoking status: Never Smoker     Smokeless tobacco: Never Used     Alcohol use Yes      Comment: rare     Family History   Problem Relation Age of Onset     HEART DISEASE Mother      arrythmia     Diabetes Father      C.A.D. Father      Coronary Artery Disease Father      C.A.D. Paternal Grandfather      Diabetes Paternal Grandfather          Current Outpatient Prescriptions   Medication Sig Dispense Refill     buPROPion (WELLBUTRIN XL) 150 MG 24 hr tablet Take 1 tablet (150 mg) by mouth every morning 90 tablet 3     Allergies   Allergen Reactions     Nkda [No Known Drug Allergies]      Wasps [Hornets] Nausea and Vomiting and Hives     Recent Labs   Lab Test  09/24/18   0816  06/21/18   0852  02/26/17 2050 02/24/14   0930   A1C   --   5.6   --    --    --    LDL   --   91   --    --   121   HDL   --   45*   --    --   48*   TRIG   --   128   --    --   99   ALT   --    --   21   --    --    CR   --    --   0.83   --    --    GFRESTIMATED   --    --   74   --    --    GFRESTBLACK   --    --   90   --    --    POTASSIUM   --    --   4.2   --    --    TSH  4.68*  5.22*   --    < >  2.36    < > = values in this interval not displayed.      BP Readings from Last 3 Encounters:   11/16/18 128/88   11/05/18 120/84   07/09/18 127/80    Wt Readings from Last 3 Encounters:   11/16/18 183 lb (83 kg)   11/05/18 188 lb (85.3 kg)   07/09/18 215 lb (97.5 kg)                  Labs reviewed in  "EPIC    Reviewed and updated as needed this visit by clinical staff       Reviewed and updated as needed this visit by Provider         ROS:  Constitutional, HEENT, cardiovascular, pulmonary, GI, , musculoskeletal, neuro, skin, endocrine and psych systems are negative, except as otherwise noted.    OBJECTIVE:     /88  Pulse 71  Temp 97  F (36.1  C) (Tympanic)  Ht 5' 7\" (1.702 m)  Wt 183 lb (83 kg)  SpO2 100%  Breastfeeding? No  BMI 28.66 kg/m2  Body mass index is 28.66 kg/(m^2).  GENERAL: healthy, alert and no distress  MS: no gross musculoskeletal defects noted, no edema  SKIN: Right buttocks with skin ulceration measuring 2-3 mm no redness or excoriation.  PSYCH: mentation appears normal, affect normal/bright    Diagnostic Test Results:  Labs - will do in 1 month - and come fasting.    ASSESSMENT/PLAN:     1. Skin lesion   Await viral culture.    - Viral Culture Non-respiratory    2. Major depressive disorder, recurrent episode, mild (H)  Stable    3. Thyroid nodule   Recheck TSH    - TSH; Future  - T4 FREE; Future    4. BMI 28.0-28.9,adult     - Lipid panel reflex to direct LDL Fasting; Future  - Glucose; Future  - Hemoglobin A1c; Future    5. Family history of diabetes mellitus     - Lipid panel reflex to direct LDL Fasting; Future  - Glucose; Future  - Hemoglobin A1c; Future    6. CARDIOVASCULAR SCREENING; LDL GOAL LESS THAN 130         Patient Instructions   Discussed keeping area clean and dry - cleanse with dial soap and water.  May apply Bacitracin as needed daily until healed.    Viral culture done today - we will call you with results of this.    Return to clinic if new skin lesions occur to evaluated and for possible testing.    Follow up as problems arise.    KATY Villafana NP  [facility]    "

## 2018-11-16 NOTE — MR AVS SNAPSHOT
After Visit Summary   11/16/2018    Consuelo Medellin    MRN: [id number]           Patient Information     Date Of Birth          1970        Visit Information        Provider Department      11/16/2018 11:00 AM Nano Quinonez NP [facility]        Today's Diagnoses     Skin lesion    -  1      Care Instructions    Discussed keeping area clean and dry - cleanse with dial soap and water.  May apply Bacitracin as needed daily until healed.    Viral culture done today - we will call you with results of this.    Return to clinic if new skin lesions occur to evaluated and for possible testing.    Follow up as problems arise.    KATY Villafana            Follow-ups after your visit        Follow-up notes from your care team     Return if symptoms worsen or fail to improve.      Who to contact     If you have questions or need follow up information about today's clinic visit or your schedule please contact [facility] directly at [phone].  Normal or non-critical lab and imaging results will be communicated to you by Bebestorehart, letter or phone within 4 business days after the clinic has received the results. If you do not hear from us within 7 days, please contact the clinic through Bebestorehart or phone. If you have a critical or abnormal lab result, we will notify you by phone as soon as possible.  Submit refill requests through BedyCasa or call your pharmacy and they will forward the refill request to us. Please allow 3 business days for your refill to be completed.          Additional Information About Your Visit        MyChart Information     B[employer]Casa gives you secure access to your electronic health record. If you see a primary care provider, you can also send messages to your care team and make appointments. If you have questions, please call your primary care clinic.  If you do not have a primary care provider, please call [phone] and they will assist you.       "  Care EveryWhere ID     This is your Care EveryWhere ID. This could be used by other organizations to access your [place] medical records  [id number]        Your Vitals Were     Pulse Temperature Height Pulse Oximetry Breastfeeding? BMI (Body Mass Index)    71 97  F (36.1  C) (Tympanic) 5' 7\" (1.702 m) 100% No 28.66 kg/m2       Blood Pressure from Last 3 Encounters:   11/16/18 128/88   11/05/18 120/84   07/09/18 127/80    Weight from Last 3 Encounters:   11/16/18 183 lb (83 kg)   11/05/18 188 lb (85.3 kg)   07/09/18 215 lb (97.5 kg)              We Performed the Following     Viral Culture Non-respiratory        Primary Care Provider Office Phone # Fax #    Nano Mendozasharona Quinonez [phone] [address] [facility] [address]        Equal Access to Services     BREEZY PATEL : Hadii mercedes callo Sojuan, waaxda luqadaha, qaybta kaalmada adeegyada, jenny haji . So [facility] [phone].    ATENCIÓN: Si habla español, tiene a navarro disposición servicios gratuitos de asistencia lingüística. Llame al [phone].    We comply with applicable federal civil rights laws and [place] laws. We do not discriminate on the basis of race, color, national origin, age, disability, sex, sexual orientation, or gender identity.            Thank you!     Thank you for choosing [facility]  for your care. Our goal is always to provide you with excellent care. Hearing back from our patients is one way we can continue to improve our services. Please take a few minutes to complete the written survey that you may receive in the mail after your visit with us. Thank you!             Your Updated Medication List - Protect others around you: Learn how to safely use, store and throw away your medicines at www.disposemymeds.org.          This list is accurate as of 11/16/18 11:39 AM.  Always use your most recent med list.                   Brand Name Dispense Instructions for " use Diagnosis    buPROPion 150 MG 24 hr tablet    WELLBUTRIN XL    90 tablet    Take 1 tablet (150 mg) by mouth every morning    Seasonal affective disorder (H), Major depressive disorder, recurrent episode, mild (H), Attention deficit disorder of adult, Need for prophylactic vaccination and inoculation against influenza

## 2018-11-16 NOTE — NURSING NOTE
"Initial /88  Pulse 71  Temp 97  F (36.1  C) (Tympanic)  Ht 5' 7\" (1.702 m)  Wt 183 lb (83 kg)  SpO2 100%  Breastfeeding? No  BMI 28.66 kg/m2 Estimated body mass index is 28.66 kg/(m^2) as calculated from the following:    Height as of this encounter: 5' 7\" (1.702 m).    Weight as of this encounter: 183 lb (83 kg). .    Adeline Darden CMA ([facility])  "

## 2018-11-29 LAB
SPECIMEN SOURCE: NORMAL
VIRUS SPEC CULT: NORMAL
VIRUS SPEC CULT: NORMAL

## 2018-12-31 DIAGNOSIS — Z83.3 FAMILY HISTORY OF DIABETES MELLITUS: ICD-10-CM

## 2018-12-31 DIAGNOSIS — E04.1 THYROID NODULE: ICD-10-CM

## 2018-12-31 LAB
CHOLEST SERPL-MCNC: 122 MG/DL
GLUCOSE SERPL-MCNC: 86 MG/DL (ref 70–99)
HBA1C MFR BLD: 5.4 % (ref 0–5.6)
HDLC SERPL-MCNC: 55 MG/DL
LDLC SERPL CALC-MCNC: 58 MG/DL
NONHDLC SERPL-MCNC: 67 MG/DL
T4 FREE SERPL-MCNC: 0.97 NG/DL (ref 0.76–1.46)
TRIGL SERPL-MCNC: 44 MG/DL
TSH SERPL DL<=0.005 MIU/L-ACNC: 4.01 MU/L (ref 0.4–4)

## 2018-12-31 PROCEDURE — 84439 ASSAY OF FREE THYROXINE: CPT | Performed by: NURSE PRACTITIONER

## 2018-12-31 PROCEDURE — 83036 HEMOGLOBIN GLYCOSYLATED A1C: CPT | Performed by: NURSE PRACTITIONER

## 2018-12-31 PROCEDURE — 84443 ASSAY THYROID STIM HORMONE: CPT | Performed by: NURSE PRACTITIONER

## 2018-12-31 PROCEDURE — 80061 LIPID PANEL: CPT | Performed by: NURSE PRACTITIONER

## 2018-12-31 PROCEDURE — 82947 ASSAY GLUCOSE BLOOD QUANT: CPT | Performed by: NURSE PRACTITIONER

## 2018-12-31 PROCEDURE — 36415 COLL VENOUS BLD VENIPUNCTURE: CPT | Performed by: NURSE PRACTITIONER

## 2018-12-31 NOTE — LETTER
January 2, 2019      Consuelo Medellin  [address] [address] [place] [id number]        Dear ,    We are writing to inform you of your test results. All of your labs are within normal limits. Your thyroid levels have remained stable and your cholesterol looks great.     Resulted Orders   Hemoglobin A1c   Result Value Ref Range    Hemoglobin A1C 5.4 0 - 5.6 %      Comment:      Normal <5.7% Prediabetes 5.7-6.4%  Diabetes 6.5% or higher - adopted from ADA   consensus guidelines.     Glucose   Result Value Ref Range    Glucose 86 70 - 99 mg/dL      Comment:      Fasting specimen   Lipid panel reflex to direct LDL Fasting   Result Value Ref Range    Cholesterol 122 <200 mg/dL    Triglycerides 44 <150 mg/dL      Comment:      Fasting specimen    HDL Cholesterol 55 >49 mg/dL    LDL Cholesterol Calculated 58 <100 mg/dL      Comment:      Desirable:       <100 mg/dl    Non HDL Cholesterol 67 <130 mg/dL   T4 FREE   Result Value Ref Range    T4 Free 0.97 0.76 - 1.46 ng/dL   TSH   Result Value Ref Range    TSH 4.01 (H) 0.40 - 4.00 mU/L       If you have any questions or concerns, please call the clinic at the number listed above.       Sincerely,        [facility]

## 2019-01-01 ENCOUNTER — MYC MEDICAL ADVICE (OUTPATIENT)
Dept: FAMILY MEDICINE | Facility: CLINIC | Age: 49
End: 2019-01-01

## 2019-01-02 ENCOUNTER — MYC MEDICAL ADVICE (OUTPATIENT)
Dept: FAMILY MEDICINE | Facility: CLINIC | Age: 49
End: 2019-01-02

## 2019-01-02 NOTE — TELEPHONE ENCOUNTER
Nano,    Please see patient's MyChart message regarding weight loss and improved labs.      YEN MelendezN, RN

## 2019-01-02 NOTE — RESULT ENCOUNTER NOTE
All of your lab results are normal - thyroid levels have remained stable.  Cholesterol is great!    Please notify patient of results.  KATY Villafana

## 2019-03-06 NOTE — PROGRESS NOTES
"  SUBJECTIVE:                                                    Consuelo Medellin is 48 year old female   Chief Complaint   Patient presents with     UTI     Symptoms for 3 days, has improved. States dark orange urine, states no other urinary symptoms. Has tried no home treatment to date. Increased fluids.      LAB REQUEST     Requesting Vitamin D and Iron levels checked, states she is curious as to what her levels would be.         Problem list and histories reviewed & adjusted, as indicated.  Additional history: weight loss using [employer] program, no surgery, is vegan.  [place] more frequently since weight loss.  Clear mind, lack of worrying about eating is best part of new eating style.  No calorie or carbohydrate count, just weight everything, no flour, no sugar, \"I am a food addict\" mentality.    Patient Active Problem List   Diagnosis     CARDIOVASCULAR SCREENING; LDL GOAL LESS THAN 160     Blood type O+     Attention deficit disorder of adult     Major depressive disorder, recurrent episode, mild (H)     Thyroid nodule     Seasonal affective disorder (H)     Family history of diabetes mellitus     S/P thyroidectomy     BMI 28.0-28.9,adult     Other specified noninflammatory disorder of cervix     Past Surgical History:   Procedure Laterality Date     GYN SURGERY      C section     HERNIORRHAPHY INGUINAL  10/26/2011    Procedure:HERNIORRHAPHY INGUINAL; Right Inguinal Herniorrhapy with Mesh Placement; Surgeon:ABBIE SHIELDS; Location:[facility]     HERNIORRHAPHY VENTRAL N/A 1/4/2017    Procedure: HERNIORRHAPHY VENTRAL;  Surgeon: Stevie Bangura MD;  Location: [facility]     PE TUBES       PHACOEMULSIFICATION WITH STANDARD INTRAOCULAR LENS IMPLANT Left 5/7/2018    Procedure: PHACOEMULSIFICATION WITH STANDARD INTRAOCULAR LENS IMPLANT;  Left cataract removal with implant;  Surgeon: Sukh Mackey MD;  Location: [facility]     PHACOEMULSIFICATION WITH STANDARD INTRAOCULAR LENS IMPLANT Right " 7/9/2018    Procedure: PHACOEMULSIFICATION WITH STANDARD INTRAOCULAR LENS IMPLANT;  Right Cataract Removal with Implant;  Surgeon: Sukh Mackey MD;  Location: [facility]     THYROIDECTOMY Left 3/8/2016    Procedure: THYROIDECTOMY;  Surgeon: Anitra Ornelas MD;  Location:  OR       Social History     Tobacco Use     Smoking status: Never Smoker     Smokeless tobacco: Never Used   Substance Use Topics     Alcohol use: Yes     Comment: rare     Family History   Problem Relation Age of Onset     Heart Disease Mother         arrythmia     Diabetes Father      C.A.D. Father      Coronary Artery Disease Father      C.A.D. Paternal Grandfather      Diabetes Paternal Grandfather          Current Outpatient Medications   Medication Sig Dispense Refill     buPROPion (WELLBUTRIN XL) 150 MG 24 hr tablet Take 1 tablet (150 mg) by mouth every morning 90 tablet 3     Allergies   Allergen Reactions     Nkda [No Known Drug Allergies]      Wasps [Hornets] Nausea and Vomiting and Hives     Recent Labs   Lab Test 12/31/18  0937 09/24/18  0816 06/21/18  0852 02/26/17 2050 02/24/14  0930   A1C 5.4  --  5.6  --   --   --    LDL 58  --  91  --   --  121   HDL 55  --  45*  --   --  48*   TRIG 44  --  128  --   --  99   ALT  --   --   --  21  --   --    CR  --   --   --  0.83  --   --    GFRESTIMATED  --   --   --  74  --   --    GFRESTBLACK  --   --   --  90  --   --    POTASSIUM  --   --   --  4.2  --   --    TSH 4.01* 4.68* 5.22*  --    < > 2.36    < > = values in this interval not displayed.      BP Readings from Last 3 Encounters:   03/07/19 118/80   11/16/18 128/88   11/05/18 120/84    Wt Readings from Last 3 Encounters:   03/07/19 71 kg (156 lb 9.6 oz)   11/16/18 83 kg (183 lb)   11/05/18 85.3 kg (188 lb)         ROS:  Constitutional, HEENT, cardiovascular, pulmonary, gi and gu systems are negative, except as otherwise noted.    OBJECTIVE:                                                    /80   Pulse 57   Temp  "96.4  F (35.8  C) (Tympanic)   Resp 12   Ht 1.715 m (5' 7.5\")   Wt 71 kg (156 lb 9.6 oz)   SpO2 99%   BMI 24.17 kg/m     GENERAL APPEARANCE ADULT: Alert, no acute distress  PSYCH: mentation appears normal., affect and mood normal  Diagnostic Test Results:  Results for orders placed or performed in visit on 03/07/19   UA reflex to Microscopic and Culture   Result Value Ref Range    Color Urine Yellow     Appearance Urine Slightly Cloudy     Glucose Urine Negative NEG^Negative mg/dL    Bilirubin Urine Negative NEG^Negative    Ketones Urine Negative NEG^Negative mg/dL    Specific Gravity Urine 1.010 1.003 - 1.035    Blood Urine Moderate (A) NEG^Negative    pH Urine 6.5 5.0 - 7.0 pH    Protein Albumin Urine Negative NEG^Negative mg/dL    Urobilinogen Urine 0.2 0.2 - 1.0 EU/dL    Nitrite Urine Negative NEG^Negative    Leukocyte Esterase Urine Trace (A) NEG^Negative    Source Midstream Urine    Urine Microscopic   Result Value Ref Range    WBC Urine 5-10 (A) OTO5^0 - 5 /HPF    RBC Urine 2-5 (A) OTO2^O - 2 /HPF    Squamous Epithelial /LPF Urine Few FEW^Few /LPF    Uric Acid Crystals Few (A) NEG^Negative /HPF          ASSESSMENT/PLAN:                                                    1. Urinary problem  - UA reflex to Microscopic and Culture  - Urine Microscopic    2. Fatigue, unspecified type  Weight loss and restrictive diet, wish levels checked.  - Vitamin D Deficiency  - Iron  - TSH  - T4 FREE  - Vitamin B12    3. Urinary tract infection with hematuria, site unspecified  Urine culture results to check sensitivities.  Notify in 3 days.  - sulfamethoxazole-trimethoprim (BACTRIM DS) 800-160 MG tablet; Take 1 tablet by mouth 2 times daily for 5 days  Dispense: 10 tablet; Refill: 0    Lana Gilbert MD  [facility]    "

## 2019-03-07 ENCOUNTER — OFFICE VISIT (OUTPATIENT)
Dept: FAMILY MEDICINE | Facility: CLINIC | Age: 49
End: 2019-03-07
Payer: COMMERCIAL

## 2019-03-07 VITALS
OXYGEN SATURATION: 99 % | DIASTOLIC BLOOD PRESSURE: 80 MMHG | SYSTOLIC BLOOD PRESSURE: 118 MMHG | BODY MASS INDEX: 23.73 KG/M2 | HEIGHT: 68 IN | WEIGHT: 156.6 LBS | RESPIRATION RATE: 12 BRPM | HEART RATE: 57 BPM | TEMPERATURE: 96.4 F

## 2019-03-07 DIAGNOSIS — R39.89 URINARY PROBLEM: Primary | ICD-10-CM

## 2019-03-07 DIAGNOSIS — R31.9 URINARY TRACT INFECTION WITH HEMATURIA, SITE UNSPECIFIED: ICD-10-CM

## 2019-03-07 DIAGNOSIS — R53.83 FATIGUE, UNSPECIFIED TYPE: ICD-10-CM

## 2019-03-07 DIAGNOSIS — N39.0 URINARY TRACT INFECTION WITH HEMATURIA, SITE UNSPECIFIED: ICD-10-CM

## 2019-03-07 LAB
ALBUMIN UR-MCNC: NEGATIVE MG/DL
APPEARANCE UR: ABNORMAL
BILIRUB UR QL STRIP: NEGATIVE
COLOR UR AUTO: YELLOW
GLUCOSE UR STRIP-MCNC: NEGATIVE MG/DL
HGB UR QL STRIP: ABNORMAL
IRON SERPL-MCNC: 93 UG/DL (ref 35–180)
KETONES UR STRIP-MCNC: NEGATIVE MG/DL
LEUKOCYTE ESTERASE UR QL STRIP: ABNORMAL
NITRATE UR QL: NEGATIVE
NON-SQ EPI CELLS #/AREA URNS LPF: ABNORMAL /LPF
PH UR STRIP: 6.5 PH (ref 5–7)
RBC #/AREA URNS AUTO: ABNORMAL /HPF
SOURCE: ABNORMAL
SP GR UR STRIP: 1.01 (ref 1–1.03)
T4 FREE SERPL-MCNC: 0.98 NG/DL (ref 0.76–1.46)
TSH SERPL DL<=0.005 MIU/L-ACNC: 3.73 MU/L (ref 0.4–4)
URATE CRY #/AREA URNS HPF: ABNORMAL /HPF
UROBILINOGEN UR STRIP-ACNC: 0.2 EU/DL (ref 0.2–1)
VIT B12 SERPL-MCNC: 1283 PG/ML (ref 193–986)
WBC #/AREA URNS AUTO: ABNORMAL /HPF

## 2019-03-07 PROCEDURE — 82607 VITAMIN B-12: CPT | Performed by: FAMILY MEDICINE

## 2019-03-07 PROCEDURE — 99214 OFFICE O/P EST MOD 30 MIN: CPT | Performed by: FAMILY MEDICINE

## 2019-03-07 PROCEDURE — 83540 ASSAY OF IRON: CPT | Performed by: FAMILY MEDICINE

## 2019-03-07 PROCEDURE — 82306 VITAMIN D 25 HYDROXY: CPT | Performed by: FAMILY MEDICINE

## 2019-03-07 PROCEDURE — 84443 ASSAY THYROID STIM HORMONE: CPT | Performed by: FAMILY MEDICINE

## 2019-03-07 PROCEDURE — 36415 COLL VENOUS BLD VENIPUNCTURE: CPT | Performed by: FAMILY MEDICINE

## 2019-03-07 PROCEDURE — 84439 ASSAY OF FREE THYROXINE: CPT | Performed by: FAMILY MEDICINE

## 2019-03-07 PROCEDURE — 81001 URINALYSIS AUTO W/SCOPE: CPT | Performed by: FAMILY MEDICINE

## 2019-03-07 RX ORDER — SULFAMETHOXAZOLE/TRIMETHOPRIM 800-160 MG
1 TABLET ORAL 2 TIMES DAILY
Qty: 10 TABLET | Refills: 0 | Status: SHIPPED | OUTPATIENT
Start: 2019-03-07 | End: 2019-03-12

## 2019-03-07 ASSESSMENT — MIFFLIN-ST. JEOR: SCORE: 1385.89

## 2019-03-07 NOTE — NURSING NOTE
"Initial /80   Pulse 57   Temp 96.4  F (35.8  C) (Tympanic)   Resp 12   Ht 1.715 m (5' 7.5\")   Wt 71 kg (156 lb 9.6 oz)   SpO2 99%   BMI 24.17 kg/m   Estimated body mass index is 24.17 kg/m  as calculated from the following:    Height as of this encounter: 1.715 m (5' 7.5\").    Weight as of this encounter: 71 kg (156 lb 9.6 oz). .      "

## 2019-03-08 LAB — DEPRECATED CALCIDIOL+CALCIFEROL SERPL-MC: 39 UG/L (ref 20–75)

## 2019-09-26 ENCOUNTER — IMMUNIZATION (OUTPATIENT)
Dept: FAMILY MEDICINE | Facility: CLINIC | Age: 49
End: 2019-09-26
Payer: COMMERCIAL

## 2019-09-26 PROCEDURE — 90686 IIV4 VACC NO PRSV 0.5 ML IM: CPT

## 2019-09-26 PROCEDURE — 90471 IMMUNIZATION ADMIN: CPT

## 2019-10-01 DIAGNOSIS — F33.0 MAJOR DEPRESSIVE DISORDER, RECURRENT EPISODE, MILD (H): ICD-10-CM

## 2019-10-01 DIAGNOSIS — F33.8 SEASONAL AFFECTIVE DISORDER (H): ICD-10-CM

## 2019-10-01 DIAGNOSIS — F98.8 ATTENTION DEFICIT DISORDER OF ADULT: ICD-10-CM

## 2019-10-01 DIAGNOSIS — Z23 NEED FOR PROPHYLACTIC VACCINATION AND INOCULATION AGAINST INFLUENZA: ICD-10-CM

## 2019-10-01 NOTE — LETTER
[facility]  [address] [facility] [address]  Phone: [phone]       October 3, 2019         Consuelo Medellin  [address] [address] [place] [id number]            Dear Consuelo:    We are concerned about your health care.  We recently provided you with medication refills.  Many medications require routine follow-up with your doctor.    Your prescription(s) have been refilled for 30 days so you may have time for the above noted follow-up. Please call to schedule soon so we can assure you have an appointment before your next refills are needed.    Thank you,      KATY Villafana / bret

## 2019-10-01 NOTE — TELEPHONE ENCOUNTER
"Requested Prescriptions   Pending Prescriptions Disp Refills     buPROPion (WELLBUTRIN XL) 150 MG 24 hr tablet 90 tablet 3     Sig: Take 1 tablet (150 mg) by mouth every morning       SSRIs Protocol Failed - 10/1/2019  9:55 AM  ROSY-7 SCORE 10/14/2016 4/3/2017 6/21/2018   Total Score - - -   Total Score 0 0 0             Failed - PHQ-9 score less than 5 in past 6 months     Please review last PHQ-9 score.   PHQ-9 SCORE 4/3/2017 12/14/2017 6/21/2018   PHQ-9 Total Score - - -   PHQ-9 Total Score 0 0 0             Passed - Medication is Bupropion     If the medication is Bupropion (Wellbutrin), and the patient is taking for smoking cessation; OK to refill.          Passed - Medication is active on med list        Passed - Patient is age 18 or older        Passed - No active pregnancy on record        Passed - No positive pregnancy test in last 12 months        Passed - Recent (6 mo) or future (30 days) visit within the authorizing provider's specialty     Patient had office visit in the last 6 months or has a visit in the next 30 days with authorizing provider or within the authorizing provider's specialty.  See \"Patient Info\" tab in inbasket, or \"Choose Columns\" in Meds & Orders section of the refill encounter.            Last Written Prescription Date:  6/21/2018  Last Fill Quantity: 90,  # refills: 3   Last office visit: 11/16/2018 with prescribing provider:  Devi  3/7/2019 with [facility]   Future Office Visit:      "

## 2019-10-03 RX ORDER — BUPROPION HYDROCHLORIDE 150 MG/1
150 TABLET ORAL EVERY MORNING
Qty: 30 TABLET | Refills: 0 | Status: SHIPPED | OUTPATIENT
Start: 2019-10-03 | End: 2019-10-23

## 2019-10-03 NOTE — TELEPHONE ENCOUNTER
Due for clinic visit with PCP. 30 day niurka fill given. Reminder letter sent.      Munira JAMES RN

## 2019-10-23 ENCOUNTER — OFFICE VISIT (OUTPATIENT)
Dept: FAMILY MEDICINE | Facility: CLINIC | Age: 49
End: 2019-10-23
Payer: COMMERCIAL

## 2019-10-23 VITALS
DIASTOLIC BLOOD PRESSURE: 84 MMHG | HEIGHT: 68 IN | BODY MASS INDEX: 25.75 KG/M2 | HEART RATE: 79 BPM | OXYGEN SATURATION: 97 % | RESPIRATION RATE: 14 BRPM | WEIGHT: 169.9 LBS | TEMPERATURE: 97 F | SYSTOLIC BLOOD PRESSURE: 134 MMHG

## 2019-10-23 DIAGNOSIS — F33.0 MAJOR DEPRESSIVE DISORDER, RECURRENT EPISODE, MILD (H): ICD-10-CM

## 2019-10-23 DIAGNOSIS — Z83.3 FAMILY HISTORY OF DIABETES MELLITUS: Primary | ICD-10-CM

## 2019-10-23 DIAGNOSIS — Z23 NEED FOR PROPHYLACTIC VACCINATION AND INOCULATION AGAINST INFLUENZA: ICD-10-CM

## 2019-10-23 DIAGNOSIS — Z13.6 CARDIOVASCULAR SCREENING; LDL GOAL LESS THAN 160: ICD-10-CM

## 2019-10-23 DIAGNOSIS — F98.8 ATTENTION DEFICIT DISORDER OF ADULT: ICD-10-CM

## 2019-10-23 DIAGNOSIS — F33.8 SEASONAL AFFECTIVE DISORDER (H): ICD-10-CM

## 2019-10-23 PROCEDURE — 99213 OFFICE O/P EST LOW 20 MIN: CPT | Performed by: NURSE PRACTITIONER

## 2019-10-23 RX ORDER — BUPROPION HYDROCHLORIDE 150 MG/1
150 TABLET ORAL EVERY MORNING
Qty: 90 TABLET | Refills: 3 | Status: SHIPPED | OUTPATIENT
Start: 2019-10-23 | End: 2023-03-10

## 2019-10-23 ASSESSMENT — PATIENT HEALTH QUESTIONNAIRE - PHQ9: SUM OF ALL RESPONSES TO PHQ QUESTIONS 1-9: 2

## 2019-10-23 ASSESSMENT — MIFFLIN-ST. JEOR: SCORE: 1441.22

## 2019-10-23 NOTE — NURSING NOTE
"Initial /84 (BP Location: Left arm, Patient Position: Sitting, Cuff Size: Adult Regular)   Pulse 79   Temp 97  F (36.1  C) (Tympanic)   Resp 14   Ht 1.715 m (5' 7.5\")   Wt 77.1 kg (169 lb 14.4 oz)   LMP 10/22/2019 (Exact Date)   SpO2 97%   BMI 26.22 kg/m   Estimated body mass index is 26.22 kg/m  as calculated from the following:    Height as of this encounter: 1.715 m (5' 7.5\").    Weight as of this encounter: 77.1 kg (169 lb 14.4 oz). .    Margie Andersen on 10/23/2019 at 8:46 AM    "

## 2019-10-23 NOTE — PROGRESS NOTES
Subjective     Consuelo Medellin is a 49 year old female who presents to clinic today for the following health issues:  Chief Complaint   Patient presents with     Depression     follow up         HPI   Depression Followup    How are you doing with your depression since your last visit? Improved, she states that she can tell if she misses a day and she would like to continue her Wellbutrin.     Are you having other symptoms that might be associated with depression? No    Have you had a significant life event?  No     Are you feeling anxious or having panic attacks?   No    Do you have any concerns with your use of alcohol or other drugs? No    Social History     Tobacco Use     Smoking status: Never Smoker     Smokeless tobacco: Never Used   Substance Use Topics     Alcohol use: Yes     Comment: rare     Drug use: No     PHQ 4/3/2017 12/14/2017 6/21/2018   PHQ-9 Total Score 0 0 0   Q9: Thoughts of better off dead/self-harm past 2 weeks Not at all Not at all Not at all     ROSY-7 SCORE 10/14/2016 4/3/2017 6/21/2018   Total Score - - -   Total Score 0 0 0     In the past two weeks have you had thoughts of suicide or self-harm?  No.    Do you have concerns about your personal safety or the safety of others?   No    Suicide Assessment Five-step Evaluation and Treatment (SAFE-T)      How many servings of fruits and vegetables do you eat daily?  She eats 2 lbs of vegetables a day and 2 servings of fruit at day.     On average, how many sweetened beverages do you drink each day (soda, juice, sweet tea, etc)?   0. Does not consume any sugar or flour.     How many days per week do you miss taking your medication? 0      Lost weight and doing well.  Wt Readings from Last 2 Encounters:   10/23/19 77.1 kg (169 lb 14.4 oz)   03/07/19 71 kg (156 lb 9.6 oz)     Body mass index is 26.22 kg/m .      Patient Active Problem List   Diagnosis     CARDIOVASCULAR SCREENING; LDL GOAL LESS THAN 160     Blood type O+     Attention deficit disorder  of adult     Major depressive disorder, recurrent episode, mild (H)     Thyroid nodule     Seasonal affective disorder (H)     Family history of diabetes mellitus     S/P thyroidectomy     BMI 28.0-28.9,adult     Other specified noninflammatory disorder of cervix     Past Surgical History:   Procedure Laterality Date     GYN SURGERY      C section     HERNIORRHAPHY INGUINAL  10/26/2011    Procedure:HERNIORRHAPHY INGUINAL; Right Inguinal Herniorrhapy with Mesh Placement; Surgeon:ANITRA SHIELDS; Location:[facility]     HERNIORRHAPHY VENTRAL N/A 1/4/2017    Procedure: HERNIORRHAPHY VENTRAL;  Surgeon: Stevie Bangura MD;  Location: [facility]     PE TUBES       PHACOEMULSIFICATION WITH STANDARD INTRAOCULAR LENS IMPLANT Left 5/7/2018    Procedure: PHACOEMULSIFICATION WITH STANDARD INTRAOCULAR LENS IMPLANT;  Left cataract removal with implant;  Surgeon: Sukh Mackey MD;  Location: [facility]     PHACOEMULSIFICATION WITH STANDARD INTRAOCULAR LENS IMPLANT Right 7/9/2018    Procedure: PHACOEMULSIFICATION WITH STANDARD INTRAOCULAR LENS IMPLANT;  Right Cataract Removal with Implant;  Surgeon: Sukh Mackey MD;  Location: [facility]     THYROIDECTOMY Left 3/8/2016    Procedure: THYROIDECTOMY;  Surgeon: Anitra Ornelas MD;  [facility]       Social History     Tobacco Use     Smoking status: Never Smoker     Smokeless tobacco: Never Used   Substance Use Topics     Alcohol use: Yes     Comment: rare     Family History   Problem Relation Age of Onset     Heart Disease Mother         arrythmia     Diabetes Father      C.A.D. Father      Coronary Artery Disease Father      C.A.D. Paternal Grandfather      Diabetes Paternal Grandfather          Current Outpatient Medications   Medication Sig Dispense Refill     buPROPion (WELLBUTRIN XL) 150 MG 24 hr tablet Take 1 tablet (150 mg) by mouth every morning 90 tablet 3     Allergies   Allergen Reactions     Nkda [No Known Drug Allergies]      Wasps  "[Hornets] Nausea and Vomiting and Hives     Recent Labs   Lab Test 03/07/19  0735 12/31/18  0937  06/21/18  0852 02/26/17 2050 02/24/14  0930   A1C  --  5.4  --  5.6  --   --   --    LDL  --  58  --  91  --   --  121   HDL  --  55  --  45*  --   --  48*   TRIG  --  44  --  128  --   --  99   ALT  --   --   --   --  21  --   --    CR  --   --   --   --  0.83  --   --    GFRESTIMATED  --   --   --   --  74  --   --    GFRESTBLACK  --   --   --   --  90  --   --    POTASSIUM  --   --   --   --  4.2  --   --    TSH 3.73 4.01*   < > 5.22*  --    < > 2.36    < > = values in this interval not displayed.      BP Readings from Last 3 Encounters:   10/23/19 134/84   03/07/19 118/80   11/16/18 128/88    Wt Readings from Last 3 Encounters:   10/23/19 77.1 kg (169 lb 14.4 oz)   03/07/19 71 kg (156 lb 9.6 oz)   11/16/18 83 kg (183 lb)                    Reviewed and updated as needed this visit by Provider         Review of Systems   ROS COMP: Constitutional, HEENT, cardiovascular, pulmonary, GI, , musculoskeletal, neuro, skin, endocrine and psych systems are negative, except as otherwise noted.      Objective    /84 (BP Location: Left arm, Patient Position: Sitting, Cuff Size: Adult Regular)   Pulse 79   Temp 97  F (36.1  C) (Tympanic)   Resp 14   Ht 1.715 m (5' 7.5\")   Wt 77.1 kg (169 lb 14.4 oz)   LMP 10/22/2019 (Exact Date)   SpO2 97%   BMI 26.22 kg/m    Body mass index is 26.22 kg/m .  Physical Exam   GENERAL: healthy, alert and no distress  RESP: lungs clear to auscultation - no rales, rhonchi or wheezes  CV: regular rate and rhythm, normal S1 S2, no S3 or S4, no murmur, click or rub, no peripheral edema and peripheral pulses strong  ABDOMEN: soft, nontender, no hepatosplenomegaly, no masses and bowel sounds normal  MS: no gross musculoskeletal defects noted, no edema    Diagnostic Test Results:  Labs reviewed in Epic        Assessment & Plan     1. Seasonal affective disorder (H)  Improved.  The risks, " "benefits and treatment options of prescribed medications or other treatments have been discussed with the patient. The patient verbalized their understanding and should call or follow up if no improvement or if they develop further problems.    - buPROPion (WELLBUTRIN XL) 150 MG 24 hr tablet; Take 1 tablet (150 mg) by mouth every morning  Dispense: 90 tablet; Refill: 3    2. Major depressive disorder, recurrent episode, mild (H)     - buPROPion (WELLBUTRIN XL) 150 MG 24 hr tablet; Take 1 tablet (150 mg) by mouth every morning  Dispense: 90 tablet; Refill: 3    3. Attention deficit disorder of adult     - buPROPion (WELLBUTRIN XL) 150 MG 24 hr tablet; Take 1 tablet (150 mg) by mouth every morning  Dispense: 90 tablet; Refill: 3    4. Need for prophylactic vaccination and inoculation against influenza     - buPROPion (WELLBUTRIN XL) 150 MG 24 hr tablet; Take 1 tablet (150 mg) by mouth every morning  Dispense: 90 tablet; Refill: 3    5. Family history of diabetes mellitus     - Glucose; Future    6. CARDIOVASCULAR SCREENING; LDL GOAL LESS THAN 160     - Lipid panel reflex to direct LDL Fasting; Future  - Glucose; Future     BMI:   Estimated body mass index is 26.22 kg/m  as calculated from the following:    Height as of this encounter: 1.715 m (5' 7.5\").    Weight as of this encounter: 77.1 kg (169 lb 14.4 oz).           There are no Patient Instructions on file for this visit.    Return in about 3 months (around 1/23/2020) for Lab Work.    Nano Quinonez NP  [facility]    "

## 2019-12-02 ENCOUNTER — OFFICE VISIT (OUTPATIENT)
Dept: FAMILY MEDICINE | Facility: CLINIC | Age: 49
End: 2019-12-02
Payer: COMMERCIAL

## 2019-12-02 VITALS
TEMPERATURE: 99 F | BODY MASS INDEX: 26.37 KG/M2 | SYSTOLIC BLOOD PRESSURE: 130 MMHG | OXYGEN SATURATION: 99 % | RESPIRATION RATE: 14 BRPM | WEIGHT: 174 LBS | HEART RATE: 97 BPM | HEIGHT: 68 IN | DIASTOLIC BLOOD PRESSURE: 76 MMHG

## 2019-12-02 DIAGNOSIS — J01.90 ACUTE BACTERIAL SINUSITIS: Primary | ICD-10-CM

## 2019-12-02 DIAGNOSIS — B96.89 ACUTE BACTERIAL SINUSITIS: Primary | ICD-10-CM

## 2019-12-02 PROCEDURE — 99213 OFFICE O/P EST LOW 20 MIN: CPT | Performed by: FAMILY MEDICINE

## 2019-12-02 ASSESSMENT — MIFFLIN-ST. JEOR: SCORE: 1459.82

## 2019-12-02 NOTE — PATIENT INSTRUCTIONS
Patient Education     Sinusitis (Antibiotic Treatment)    The sinuses are air-filled spaces within the bones of the face. They connect to the inside of the nose. Sinusitis is an inflammation of the tissue that lines the sinuses. Sinusitis can occur during a cold. It can also happen due to allergies to pollens and other particles in the air. Sinusitis can cause symptoms of sinus congestion and a feeling of fullness. A sinus infection causes fever, headache, and facial pain. There is often green or yellow fluid draining from the nose or into the back of the throat (post-nasal drip). You have been given antibiotics to treat this condition.  Home care    Take the full course of antibiotics as instructed. Do not stop taking them, even when you feel better.    Drink plenty of water, hot tea, and other liquids. This may help thin nasal mucus. It also may help your sinuses drain fluids.    Heat may help soothe painful areas of your face. Use a towel soaked in hot water. Or,  the shower and direct the warm spray onto your face. Using a vaporizer along with a menthol rub at night may also help soothe symptoms.     An expectorant with guaifenesin may help thin nasal mucus and help your sinuses drain fluids.    You can use an over-the-counter decongestant, unless a similar medicine was prescribed to you. Nasal sprays work the fastest. Use one that contains phenylephrine or oxymetazoline. First blow your nose gently. Then use the spray. Do not use these medicines more often than directed on the label. If you do, your symptoms may get worse. You may also take pills that contain pseudoephedrine. Don t use products that combine multiple medicines. This is because side effects may be increased. Read labels. You can also ask the pharmacist for help. (People with high blood pressure should not use decongestants. They can raise blood pressure.)    Over-the-counter antihistamines may help if allergies contributed to your  sinusitis.      Do not use nasal rinses or irrigation during an acute sinus infection, unless your healthcare provider tells you to. Rinsing may spread the infection to other areas in your sinuses.    Use acetaminophen or ibuprofen to control pain, unless another pain medicine was prescribed to you. If you have chronic liver or kidney disease or ever had a stomach ulcer, talk with your healthcare provider before using these medicines. (Aspirin should never be taken by anyone under age 18 who is ill with a fever. It may cause severe liver damage.)    Don't smoke. This can make symptoms worse.  Follow-up care  Follow up with your healthcare provider or our staff if you are not better in 1 week.  When to seek medical advice  Call your healthcare provider if any of these occur:    Facial pain or headache that gets worse    Stiff neck    Unusual drowsiness or confusion    Swelling of your forehead or eyelids    Vision problems, such as blurred or double vision    Fever of 100.4 F (38 C) or higher, or as directed by your healthcare provider    Seizure    Breathing problems    Symptoms don't go away in 10 days  Prevention  Here are steps you can take to help prevent an infection:    Keep good hand washing habits.    Don t have close contact with people who have sore throats, colds, or other upper respiratory infections.    Don t smoke, and stay away from secondhand smoke.    Stay up to date with of your vaccines.  Date Last Reviewed: 11/1/2017 2000-2018 [employer]. [address] [place] [address], [place] [address]. All rights reserved. This information is not intended as a substitute for professional medical care. Always follow your healthcare professional's instructions.

## 2019-12-02 NOTE — PROGRESS NOTES
"Shadi Medellin is a 49 year old female who presents to clinic today for the following health issues:  Chief Complaint   Patient presents with     Sinus Problem     Pt here for sinus infection.       HPI   ENT Symptoms             Symptoms: cc Present Absent Comment   Fever/Chills  x  Chills - \"lots of cold chills overnight\"   Fatigue  x     Muscle Aches  x     Eye Irritation   x    Sneezing  x     Nasal Kash/Drg x   Congestion, left side, some drainage also   Sinus Pressure/Pain x   Left sided   Loss of smell  x     Dental pain  x  Started yesterday on left side   Sore Throat  x  Much better   Swollen Glands  x     Ear Pain/Fullness x   Behind left ear   Cough   x    Wheeze   x    Chest Pain   x    Shortness of breath  x     Rash   x    Other x   Sinus headache, horrible taste in mouth      Symptom duration:  6 days - got better 2 days ago, but got a lot worse since yesterday, making it difficult to maintain sleep overnight.   Symptom severity:  high moderate   Treatments tried:  decongestant- otc, ibuprofen   Contacts:  none       Verified above history with patient.    Denies recent travel.      Patient Active Problem List   Diagnosis     CARDIOVASCULAR SCREENING; LDL GOAL LESS THAN 160     Blood type O+     Attention deficit disorder of adult     Major depressive disorder, recurrent episode, mild (H)     Thyroid nodule     Seasonal affective disorder (H)     Family history of diabetes mellitus     S/P thyroidectomy     BMI 28.0-28.9,adult     Other specified noninflammatory disorder of cervix     Past Surgical History:   Procedure Laterality Date     GYN SURGERY      C section     HERNIORRHAPHY INGUINAL  10/26/2011    Procedure:HERNIORRHAPHY INGUINAL; Right Inguinal Herniorrhapy with Mesh Placement; Surgeon:ABBIE SHIELDS; Location:[facility]     HERNIORRHAPHY VENTRAL N/A 1/4/2017    Procedure: HERNIORRHAPHY VENTRAL;  Surgeon: Stevie Bangura MD;  Location: [facility]     PE TUBES       " PHACOEMULSIFICATION WITH STANDARD INTRAOCULAR LENS IMPLANT Left 5/7/2018    Procedure: PHACOEMULSIFICATION WITH STANDARD INTRAOCULAR LENS IMPLANT;  Left cataract removal with implant;  Surgeon: Suhk Mackey MD;  Location: [facility]     PHACOEMULSIFICATION WITH STANDARD INTRAOCULAR LENS IMPLANT Right 7/9/2018    Procedure: PHACOEMULSIFICATION WITH STANDARD INTRAOCULAR LENS IMPLANT;  Right Cataract Removal with Implant;  Surgeon: Sukh Mackey MD;  Location: [facility]     THYROIDECTOMY Left 3/8/2016    Procedure: THYROIDECTOMY;  Surgeon: Anitra Ornelas MD;  Location: [facility]       Social History     Tobacco Use     Smoking status: Never Smoker     Smokeless tobacco: Never Used   Substance Use Topics     Alcohol use: Yes     Comment: rare     Family History   Problem Relation Age of Onset     Heart Disease Mother         arrythmia     Diabetes Father      C.A.D. Father      Coronary Artery Disease Father      C.A.D. Paternal Grandfather      Diabetes Paternal Grandfather          Current Outpatient Medications   Medication Sig Dispense Refill     amoxicillin-clavulanate (AUGMENTIN) 875-125 MG tablet Take 1 tablet by mouth 2 times daily for 10 days 20 tablet 0     buPROPion (WELLBUTRIN XL) 150 MG 24 hr tablet Take 1 tablet (150 mg) by mouth every morning 90 tablet 3     Allergies   Allergen Reactions     Nkda [No Known Drug Allergies]      Wasps [Hornets] Nausea and Vomiting and Hives       Reviewed and updated as needed this visit by Provider  Tobacco  Allergies  Meds  Problems  Med Hx  Surg Hx  Fam Hx         Review of Systems   C: NEGATIVE for fever, chills, change in weight  I: NEGATIVE for worrisome rashes, moles or lesions  E: NEGATIVE for vision changes or irritation  ENT/MOUTH: see above  RESP:as above  CV: NEGATIVE for chest pain, palpitations or peripheral edema  GI: NEGATIVE for nausea, abdominal pain, heartburn, or change in bowel habits  M: NEGATIVE for significant arthralgias or  "myalgia      Objective    /76   Pulse 97   Temp 99  F (37.2  C) (Tympanic)   Resp 14   Ht 1.715 m (5' 7.5\")   Wt 78.9 kg (174 lb)   SpO2 99%   BMI 26.85 kg/m    Body mass index is 26.85 kg/m .  Physical Exam   GENERAL: healthy, alert and no distress  EYES: pink conjunctivae, no icterus  NECK: mild cervical adenopathy, nontender  HEENT: tympanic membrane intact and pearly bilaterally, nose with moderate congestion, moderate left maxillary sinus tenderness, throat not erythematous, no exudates, no oral ulcers  RESP: lungs clear to auscultation - no rales, no rhonchi, no wheezes  CV: regular rates and rhythm, normal S1 S2, no S3 or S4 and no murmur  SKIN:  Good turgor, no rashes    Diagnostic Test Results:  none         Assessment & Plan     Consuelo was seen today for sinus problem.    Diagnoses and all orders for this visit:    Acute bacterial sinusitis  -     amoxicillin-clavulanate (AUGMENTIN) 875-125 MG tablet; Take 1 tablet by mouth 2 times daily for 10 days    Patient had mild improvement but worsened shortly after that.  Discussed course and treatment.  Abx started.  Advised symptoms of nasal congestion may persist up to 2 weeks after treatment.  Advised to push oral fluids as tolerated.  Advised if with  no change in sx 1-2 weeks after treatment, return to clinic.  If with severe pain, neuro sx, or new symptoms, see provider.          Patient Instructions     Patient Education     Sinusitis (Antibiotic Treatment)    The sinuses are air-filled spaces within the bones of the face. They connect to the inside of the nose. Sinusitis is an inflammation of the tissue that lines the sinuses. Sinusitis can occur during a cold. It can also happen due to allergies to pollens and other particles in the air. Sinusitis can cause symptoms of sinus congestion and a feeling of fullness. A sinus infection causes fever, headache, and facial pain. There is often green or yellow fluid draining from the nose or into the " back of the throat (post-nasal drip). You have been given antibiotics to treat this condition.  Home care    Take the full course of antibiotics as instructed. Do not stop taking them, even when you feel better.    Drink plenty of water, hot tea, and other liquids. This may help thin nasal mucus. It also may help your sinuses drain fluids.    Heat may help soothe painful areas of your face. Use a towel soaked in hot water. Or,  the shower and direct the warm spray onto your face. Using a vaporizer along with a menthol rub at night may also help soothe symptoms.     An expectorant with guaifenesin may help thin nasal mucus and help your sinuses drain fluids.    You can use an over-the-counter decongestant, unless a similar medicine was prescribed to you. Nasal sprays work the fastest. Use one that contains phenylephrine or oxymetazoline. First blow your nose gently. Then use the spray. Do not use these medicines more often than directed on the label. If you do, your symptoms may get worse. You may also take pills that contain pseudoephedrine. Don t use products that combine multiple medicines. This is because side effects may be increased. Read labels. You can also ask the pharmacist for help. (People with high blood pressure should not use decongestants. They can raise blood pressure.)    Over-the-counter antihistamines may help if allergies contributed to your sinusitis.      Do not use nasal rinses or irrigation during an acute sinus infection, unless your healthcare provider tells you to. Rinsing may spread the infection to other areas in your sinuses.    Use acetaminophen or ibuprofen to control pain, unless another pain medicine was prescribed to you. If you have chronic liver or kidney disease or ever had a stomach ulcer, talk with your healthcare provider before using these medicines. (Aspirin should never be taken by anyone under age 18 who is ill with a fever. It may cause severe liver  damage.)    Don't smoke. This can make symptoms worse.  Follow-up care  Follow up with your healthcare provider or our staff if you are not better in 1 week.  When to seek medical advice  Call your healthcare provider if any of these occur:    Facial pain or headache that gets worse    Stiff neck    Unusual drowsiness or confusion    Swelling of your forehead or eyelids    Vision problems, such as blurred or double vision    Fever of 100.4 F (38 C) or higher, or as directed by your healthcare provider    Seizure    Breathing problems    Symptoms don't go away in 10 days  Prevention  Here are steps you can take to help prevent an infection:    Keep good hand washing habits.    Don t have close contact with people who have sore throats, colds, or other upper respiratory infections.    Don t smoke, and stay away from secondhand smoke.    Stay up to date with of your vaccines.  Date Last Reviewed: 11/1/2017 2000-2018 [employer]. [address] [place] [address]. All rights reserved. This information is not intended as a substitute for professional medical care. Always follow your healthcare professional's instructions.               Return in about 1 week (around 12/9/2019) for If condition does not improve.    Jayme An MD  [facility]

## 2020-02-23 ENCOUNTER — HEALTH MAINTENANCE LETTER (OUTPATIENT)
Age: 50
End: 2020-02-23

## 2020-04-01 NOTE — ANESTHESIA POSTPROCEDURE EVALUATION
Patient: Consuelo Lacie    HERNIORRHAPHY VENTRAL (N/A Abdomen)  Additional InformationProcedure(s):  Ventral Hernia Repair - Wound Class: I-Clean    Diagnosis:ventral hernia  Diagnosis Additional Information: No value filed.    Anesthesia Type:  MAC    Note:  Anesthesia Post Evaluation    Patient location during evaluation: Bedside  Patient participation: Able to fully participate in evaluation  Level of consciousness: awake and alert  Pain management: adequate  Airway patency: patent  Cardiovascular status: acceptable  Respiratory status: acceptable  Hydration status: acceptable  PONV: none     Anesthetic complications: None          Last vitals:  Filed Vitals:    01/04/17 1414   BP: 143/96   Temp: 37.1  C (98.7  F)   Resp: 20   SpO2: 100%       Electronically Signed By: Blanca Daniels CRNA, APRN CRNA  January 4, 2017  4:22 PM   Melisa Osullivan(Attending)

## 2020-12-06 ENCOUNTER — HEALTH MAINTENANCE LETTER (OUTPATIENT)
Age: 50
End: 2020-12-06

## 2021-01-12 NOTE — PROGRESS NOTES
[facility]          OUTPATIENT PHYSICAL THERAPY ORTHOPEDIC EVALUATION  PLAN OF TREATMENT FOR OUTPATIENT REHABILITATION  (COMPLETE FOR INITIAL CLAIMS ONLY)  Patient's Last Name, First Name, M.I.  YOB: 1970  Consuelo Medellin       Provider s Name:  [facility]   Medical Record No.  [id number]   Start of Care Date:  08/25/17   Onset Date:  08/03/17   Type:     _X__PT   ___OT   ___SLP Medical Diagnosis:   Acute muscle strain   PT Diagnosis:  R lumbar radiculopathy   Visits from SOC:  1      _________________________________________________________________________________  Plan of Treatment/Functional Goals:  manual therapy, neuromuscular re-education, ROM, strengthening, stretching           Goals     Goal Description: abolish back and radicular pain in order to resume previous lifestyle  Target Date: 10/25/17       Goal Description: Independent in HEP  Target Date: 10/25/17       Goal Description: resume previous activities:  house keeping, meal prep; ; community activities  Target Date: 10/25/17       Therapy Frequency:  1 time/week (progressing to 1x every 2 weeks )  Predicted Duration of Therapy Intervention:  8 weeks total; likely 4 visits total    Eun Michael, PT                 I CERTIFY THE NEED FOR THESE SERVICES FURNISHED UNDER        THIS PLAN OF TREATMENT AND WHILE UNDER MY CARE     (Physician co-signature of this document indicates review and certification of the therapy plan).                         Certification Date From:    8/25/2017  Certification Date To:   10/27/2017    Referring Provider:  Maryse Cui     Initial Assessment        See Epic Evaluation Start of Care Date: 08/25/17                                                                               Yes

## 2021-02-20 ENCOUNTER — HEALTH MAINTENANCE LETTER (OUTPATIENT)
Age: 51
End: 2021-02-20

## 2021-04-11 ENCOUNTER — HEALTH MAINTENANCE LETTER (OUTPATIENT)
Age: 51
End: 2021-04-11

## 2021-09-25 ENCOUNTER — HEALTH MAINTENANCE LETTER (OUTPATIENT)
Age: 51
End: 2021-09-25

## 2022-03-12 ENCOUNTER — HEALTH MAINTENANCE LETTER (OUTPATIENT)
Age: 52
End: 2022-03-12

## 2022-05-07 ENCOUNTER — HEALTH MAINTENANCE LETTER (OUTPATIENT)
Age: 52
End: 2022-05-07

## 2023-01-07 ENCOUNTER — HEALTH MAINTENANCE LETTER (OUTPATIENT)
Age: 53
End: 2023-01-07

## 2023-03-10 ENCOUNTER — APPOINTMENT (OUTPATIENT)
Dept: GENERAL RADIOLOGY | Facility: CLINIC | Age: 53
End: 2023-03-10
Attending: EMERGENCY MEDICINE
Payer: COMMERCIAL

## 2023-03-10 ENCOUNTER — HOSPITAL ENCOUNTER (EMERGENCY)
Facility: CLINIC | Age: 53
Discharge: HOME | End: 2023-03-10
Attending: EMERGENCY MEDICINE | Admitting: EMERGENCY MEDICINE
Payer: COMMERCIAL

## 2023-03-10 ENCOUNTER — NURSE TRIAGE (OUTPATIENT)
Dept: NURSING | Facility: CLINIC | Age: 53
End: 2023-03-10
Payer: COMMERCIAL

## 2023-03-10 VITALS
DIASTOLIC BLOOD PRESSURE: 102 MMHG | OXYGEN SATURATION: 98 % | WEIGHT: 213 LBS | HEIGHT: 68 IN | RESPIRATION RATE: 16 BRPM | BODY MASS INDEX: 32.28 KG/M2 | HEART RATE: 73 BPM | TEMPERATURE: 98.8 F | SYSTOLIC BLOOD PRESSURE: 157 MMHG

## 2023-03-10 DIAGNOSIS — R07.9 CHEST PAIN, UNSPECIFIED TYPE: ICD-10-CM

## 2023-03-10 LAB
ALBUMIN SERPL BCG-MCNC: 4.3 G/DL (ref 3.5–5.2)
ALP SERPL-CCNC: 84 U/L (ref 35–104)
ALT SERPL W P-5'-P-CCNC: 14 U/L (ref 10–35)
ANION GAP SERPL CALCULATED.3IONS-SCNC: 10 MMOL/L (ref 7–15)
AST SERPL W P-5'-P-CCNC: 19 U/L (ref 10–35)
BASOPHILS # BLD AUTO: 0 10E3/UL (ref 0–0.2)
BASOPHILS NFR BLD AUTO: 0 %
BILIRUB SERPL-MCNC: <0.2 MG/DL
BUN SERPL-MCNC: 21.1 MG/DL (ref 6–20)
CALCIUM SERPL-MCNC: 9.1 MG/DL (ref 8.6–10)
CHLORIDE SERPL-SCNC: 103 MMOL/L (ref 98–107)
CREAT SERPL-MCNC: 0.94 MG/DL (ref 0.51–0.95)
DEPRECATED HCO3 PLAS-SCNC: 26 MMOL/L (ref 22–29)
EOSINOPHIL # BLD AUTO: 0.1 10E3/UL (ref 0–0.7)
EOSINOPHIL NFR BLD AUTO: 1 %
ERYTHROCYTE [DISTWIDTH] IN BLOOD BY AUTOMATED COUNT: 12.9 % (ref 10–15)
GFR SERPL CREATININE-BSD FRML MDRD: 73 ML/MIN/1.73M2
GLUCOSE SERPL-MCNC: 111 MG/DL (ref 70–99)
HCT VFR BLD AUTO: 40.8 % (ref 35–47)
HGB BLD-MCNC: 13 G/DL (ref 11.7–15.7)
HOLD SPECIMEN: NORMAL
IMM GRANULOCYTES # BLD: 0.1 10E3/UL
IMM GRANULOCYTES NFR BLD: 1 %
LYMPHOCYTES # BLD AUTO: 2.7 10E3/UL (ref 0.8–5.3)
LYMPHOCYTES NFR BLD AUTO: 26 %
MCH RBC QN AUTO: 29.3 PG (ref 26.5–33)
MCHC RBC AUTO-ENTMCNC: 31.9 G/DL (ref 31.5–36.5)
MCV RBC AUTO: 92 FL (ref 78–100)
MONOCYTES # BLD AUTO: 0.6 10E3/UL (ref 0–1.3)
MONOCYTES NFR BLD AUTO: 6 %
NEUTROPHILS # BLD AUTO: 6.7 10E3/UL (ref 1.6–8.3)
NEUTROPHILS NFR BLD AUTO: 66 %
NRBC # BLD AUTO: 0 10E3/UL
NRBC BLD AUTO-RTO: 0 /100
PLATELET # BLD AUTO: 350 10E3/UL (ref 150–450)
POTASSIUM SERPL-SCNC: 4.3 MMOL/L (ref 3.4–5.3)
PROT SERPL-MCNC: 7.5 G/DL (ref 6.4–8.3)
RBC # BLD AUTO: 4.43 10E6/UL (ref 3.8–5.2)
SODIUM SERPL-SCNC: 139 MMOL/L (ref 136–145)
TROPONIN T SERPL HS-MCNC: <6 NG/L
WBC # BLD AUTO: 10.2 10E3/UL (ref 4–11)

## 2023-03-10 PROCEDURE — 36415 COLL VENOUS BLD VENIPUNCTURE: CPT | Performed by: EMERGENCY MEDICINE

## 2023-03-10 PROCEDURE — 93005 ELECTROCARDIOGRAM TRACING: CPT | Performed by: EMERGENCY MEDICINE

## 2023-03-10 PROCEDURE — 80053 COMPREHEN METABOLIC PANEL: CPT | Performed by: EMERGENCY MEDICINE

## 2023-03-10 PROCEDURE — 99285 EMERGENCY DEPT VISIT HI MDM: CPT | Mod: 25 | Performed by: EMERGENCY MEDICINE

## 2023-03-10 PROCEDURE — 84484 ASSAY OF TROPONIN QUANT: CPT | Performed by: EMERGENCY MEDICINE

## 2023-03-10 PROCEDURE — 71046 X-RAY EXAM CHEST 2 VIEWS: CPT

## 2023-03-10 PROCEDURE — 99284 EMERGENCY DEPT VISIT MOD MDM: CPT | Mod: 25 | Performed by: EMERGENCY MEDICINE

## 2023-03-10 PROCEDURE — 93010 ELECTROCARDIOGRAM REPORT: CPT | Performed by: EMERGENCY MEDICINE

## 2023-03-10 PROCEDURE — 85004 AUTOMATED DIFF WBC COUNT: CPT | Performed by: EMERGENCY MEDICINE

## 2023-03-10 RX ORDER — IBUPROFEN 200 MG
400 TABLET ORAL EVERY 4 HOURS PRN
COMMUNITY
End: 2023-04-10

## 2023-03-10 ASSESSMENT — ACTIVITIES OF DAILY LIVING (ADL)
ADLS_ACUITY_SCORE: 35
ADLS_ACUITY_SCORE: 33

## 2023-03-10 NOTE — TELEPHONE ENCOUNTER
"Consuelo calling with intermittent \"soreness\" across her chest. She rates the discomfort 3 and sometimes \"pulsing.\" She has noticed this for about one and a half weeks. It is tender to the touch. Care advice is to call 911. She refused to call 911 but will have her  bring her to [facility] [place].  Amy Power RN on 3/10/2023 at 1:55 PM      Reason for Disposition    Chest pain lasting longer than 5 minutes and ANY of the following:* Over 44 years old* Over 30 years old and at least one cardiac risk factor (e.g., diabetes mellitus, high blood pressure, high cholesterol, smoker, or strong family history of heart disease)* History of heart disease (i.e., angina, heart attack, heart failure, bypass surgery, takes nitroglycerin)* Pain is crushing, pressure-like, or heavy    Additional Information    Negative: SEVERE difficulty breathing (e.g., struggling for each breath, speaks in single words)    Negative: Passed out (i.e., fainted, collapsed and was not responding)    Negative: Difficult to awaken or acting confused (e.g., disoriented, slurred speech)    Negative: Shock suspected (e.g., cold/pale/clammy skin, too weak to stand, low BP, rapid pulse)    Protocols used: CHEST PAIN-A-OH      "

## 2023-03-10 NOTE — ED TRIAGE NOTES
Intermittent chest discomfort for the last 1.5 weeks, states it's a pulsing sensation, nothing specific makes it worse, it usually only last 5 min, today it is worse     Triage Assessment     Row Name 03/10/23 1426       Triage Assessment (Adult)    Airway WDL WDL       Cardiac WDL    Cardiac WDL X;chest pain       Cognitive/Neuro/Behavioral WDL    Cognitive/Neuro/Behavioral WDL WDL

## 2023-03-10 NOTE — ED PROVIDER NOTES
History     Chief Complaint   Patient presents with     Chest Pain     Intermittent chest discomfort for the last 1.5 weeks, states it's a pulsing sensation, nothing specific makes it worse, it usually only last 5 min, today it is worse     HPI  Consuelo Medellin is a 52 year old female who  has a past medical history of Gestational diabetes, Miscarriage (7/2010), Other ear problems, and Thyroid disease.  She presents to the emergency department reporting 1-1/2 weeks of intermittent chest soreness.  This is described as a sore feeling which initially was in the left upper chest and would come and go occurring 1-2 times per day and lasting less than 5 minutes.  This was associated with no other symptoms and nothing seemed to bring this discomfort on.  Nothing made it better and nothing made it worse.  She reports that she began to notice the discomfort occurring on the right side over the past 1 or 2 days.  She does report that it seemed as though it was worse today and this prompted her to come to the emergency department for evaluation.  She does report she does have a family history of heart attack.  She denies smoking or personal history of diabetes, hypertension or elevated cholesterol.  She denies leg pain or swelling, diaphoresis, nausea or vomiting, abdominal pain or shortness of breath.      Allergies:  Allergies   Allergen Reactions     Nkda [No Known Drug Allergies]      Wasps [Hornets] Nausea and Vomiting and Hives       Problem List:    Patient Active Problem List    Diagnosis Date Noted     BMI 28.0-28.9,adult 11/16/2018     Priority: Medium     S/P thyroidectomy 06/25/2018     Priority: Medium     Family history of diabetes mellitus 06/21/2018     Priority: Medium     Seasonal affective disorder (H) 10/24/2016     Priority: Medium     Thyroid nodule 02/29/2016     Priority: Medium     Thyroidectomy for ? Cancerous thyroid nodule 2016. On Levothyroxine since.       Major depressive disorder, recurrent  episode, mild (H) 11/10/2015     Priority: Medium     Attention deficit disorder of adult 11/18/2011     Priority: Medium     Blood type O+ 06/24/2011     Priority: Medium     Other specified noninflammatory disorder of cervix 04/15/2011     Priority: Medium     CARDIOVASCULAR SCREENING; LDL GOAL LESS THAN 160 10/31/2010     Priority: Medium        Past Medical History:    Past Medical History:   Diagnosis Date     Gestational diabetes      Miscarriage 7/2010     Other ear problems      Thyroid disease        Past Surgical History:    Past Surgical History:   Procedure Laterality Date     GYN SURGERY      C section     HERNIORRHAPHY INGUINAL  10/26/2011    Procedure:HERNIORRHAPHY INGUINAL; Right Inguinal Herniorrhapy with Mesh Placement; Surgeon:ANITRA SHIELDS; Location:[facility] OR     HERNIORRHAPHY VENTRAL N/A 1/4/2017    Procedure: HERNIORRHAPHY VENTRAL;  Surgeon: Stevie Bangura MD;  Location: [facility] OR     PE TUBES       PHACOEMULSIFICATION WITH STANDARD INTRAOCULAR LENS IMPLANT Left 5/7/2018    Procedure: PHACOEMULSIFICATION WITH STANDARD INTRAOCULAR LENS IMPLANT;  Left cataract removal with implant;  Surgeon: Sukh Mackey MD;  Location: [facility] OR     PHACOEMULSIFICATION WITH STANDARD INTRAOCULAR LENS IMPLANT Right 7/9/2018    Procedure: PHACOEMULSIFICATION WITH STANDARD INTRAOCULAR LENS IMPLANT;  Right Cataract Removal with Implant;  Surgeon: Sukh Mackey MD;  Location: [facility] OR     THYROIDECTOMY Left 3/8/2016    Procedure: THYROIDECTOMY;  Surgeon: Anitra Ornelas MD;  Location: [facility]       Family History:    Family History   Problem Relation Age of Onset     Heart Disease Mother         arrythmia     Diabetes Father      C.A.D. Father      Coronary Artery Disease Father      C.A.D. Paternal Grandfather      Diabetes Paternal Grandfather        Social History:  Marital Status:   [2]  Social History     Tobacco Use     Smoking status: Never     Smokeless tobacco: Never  "  Substance Use Topics     Alcohol use: Yes     Comment: rare     Drug use: No        Medications:    ibuprofen (ADVIL/MOTRIN) 200 MG tablet  NEW MED          Review of Systems    Physical Exam   BP: (!) 193/115  Pulse: 85  Temp: 98.8  F (37.1  C)  Resp: 16  Height: 171.5 cm (5' 7.5\")  Weight: 96.6 kg (213 lb)  SpO2: 97 %      Physical Exam  Vitals and nursing note reviewed.   Constitutional:       General: She is not in acute distress.     Appearance: She is well-developed. She is not ill-appearing, toxic-appearing or diaphoretic.   HENT:      Head: Normocephalic and atraumatic.      Mouth/Throat:      Lips: Pink.      Mouth: Mucous membranes are moist.      Pharynx: Oropharynx is clear. No oropharyngeal exudate.   Eyes:      General: Lids are normal. No scleral icterus.     Extraocular Movements: Extraocular movements intact.      Right eye: No nystagmus.      Left eye: No nystagmus.      Conjunctiva/sclera: Conjunctivae normal.      Pupils: Pupils are equal, round, and reactive to light.   Neck:      Thyroid: No thyromegaly.      Vascular: No JVD.      Trachea: No tracheal deviation.   Cardiovascular:      Rate and Rhythm: Normal rate and regular rhythm.      Pulses: Normal pulses.           Radial pulses are 2+ on the right side and 2+ on the left side.        Dorsalis pedis pulses are 2+ on the right side and 2+ on the left side.      Heart sounds: Normal heart sounds. No murmur heard.    No friction rub. No gallop.   Pulmonary:      Effort: Pulmonary effort is normal. No respiratory distress.      Breath sounds: Normal breath sounds.   Abdominal:      General: Bowel sounds are normal. There is no distension.      Palpations: Abdomen is soft. There is no mass.      Tenderness: There is no abdominal tenderness. There is no guarding or rebound.   Musculoskeletal:         General: No tenderness. Normal range of motion.      Cervical back: Normal range of motion and neck supple. No erythema or rigidity.      Right " lower leg: No edema.      Left lower leg: No edema.   Lymphadenopathy:      Cervical: No cervical adenopathy.   Skin:     General: Skin is warm and dry.      Capillary Refill: Capillary refill takes less than 2 seconds.      Coloration: Skin is not pale.      Findings: No erythema or rash.   Neurological:      Mental Status: She is alert and oriented to person, place, and time.      Cranial Nerves: No cranial nerve deficit.      Sensory: No sensory deficit.      Motor: Motor function is intact.   Psychiatric:         Mood and Affect: Mood and affect normal.         Speech: Speech normal.         Behavior: Behavior normal.         ED Course                 Procedures              EKG Interpretation:      Interpreted by Matias Jacobs MD    Symptoms at time of EKG: chest pain   Rhythm: normal sinus   Rate: normal  Axis: normal  Ectopy: none  Conduction: normal  ST Segments/ T Waves: No ST-T wave changes  Q Waves: none  Comparison to prior: Unchanged    Clinical Impression: normal EKG                 Results for orders placed or performed during the hospital encounter of 03/10/23 (from the past 24 hour(s))   [place] Draw    Narrative    The following orders were created for panel order [place] Draw.  Procedure                               Abnormality         Status                     ---------                               -----------         ------                     Extra Blue Top Tube[[id number]]                              Final result               Extra Red Top Tube[[id number]]                               Final result               Extra Green Top (Lithium...[[id number]]                      Final result               Extra Purple Top Tube[[id number]]                            Final result                 Please view results for these tests on the individual orders.   Extra Blue Top Tube   Result Value Ref Range    Hold Specimen JIC    Extra Red Top Tube   Result Value Ref Range    Hold Specimen JIC     Extra Green Top (Lithium Heparin) Tube   Result Value Ref Range    Hold Specimen JI    Extra Purple Top Tube   Result Value Ref Range    Hold Specimen JI    Comprehensive metabolic panel   Result Value Ref Range    Sodium 139 136 - 145 mmol/L    Potassium 4.3 3.4 - 5.3 mmol/L    Chloride 103 98 - 107 mmol/L    Carbon Dioxide (CO2) 26 22 - 29 mmol/L    Anion Gap 10 7 - 15 mmol/L    Urea Nitrogen 21.1 (H) 6.0 - 20.0 mg/dL    Creatinine 0.94 0.51 - 0.95 mg/dL    Calcium 9.1 8.6 - 10.0 mg/dL    Glucose 111 (H) 70 - 99 mg/dL    Alkaline Phosphatase 84 35 - 104 U/L    AST 19 10 - 35 U/L    ALT 14 10 - 35 U/L    Protein Total 7.5 6.4 - 8.3 g/dL    Albumin 4.3 3.5 - 5.2 g/dL    Bilirubin Total <0.2 <=1.2 mg/dL    GFR Estimate 73 >60 mL/min/1.73m2   CBC with platelets differential    Narrative    The following orders were created for panel order CBC with platelets differential.  Procedure                               Abnormality         Status                     ---------                               -----------         ------                     CBC with platelets and d...[[id number]]                      Final result                 Please view results for these tests on the individual orders.   Troponin T, High Sensitivity   Result Value Ref Range    Troponin T, High Sensitivity <6 <=14 ng/L   CBC with platelets and differential   Result Value Ref Range    WBC Count 10.2 4.0 - 11.0 10e3/uL    RBC Count 4.43 3.80 - 5.20 10e6/uL    Hemoglobin 13.0 11.7 - 15.7 g/dL    Hematocrit 40.8 35.0 - 47.0 %    MCV 92 78 - 100 fL    MCH 29.3 26.5 - 33.0 pg    MCHC 31.9 31.5 - 36.5 g/dL    RDW 12.9 10.0 - 15.0 %    Platelet Count 350 150 - 450 10e3/uL    % Neutrophils 66 %    % Lymphocytes 26 %    % Monocytes 6 %    % Eosinophils 1 %    % Basophils 0 %    % Immature Granulocytes 1 %    NRBCs per 100 WBC 0 <1 /100    Absolute Neutrophils 6.7 1.6 - 8.3 10e3/uL    Absolute Lymphocytes 2.7 0.8 - 5.3 10e3/uL    Absolute Monocytes 0.6  0.0 - 1.3 10e3/uL    Absolute Eosinophils 0.1 0.0 - 0.7 10e3/uL    Absolute Basophils 0.0 0.0 - 0.2 10e3/uL    Absolute Immature Granulocytes 0.1 <=0.4 10e3/uL    Absolute NRBCs 0.0 10e3/uL   XR Chest 2 Views    Narrative    XR CHEST 2 VIEWS 3/10/2023 4:22 PM    HISTORY: Chest pain    COMPARISON: 9/14/2017.    FINDINGS/    Impression    IMPRESSION: Negative chest.    INES HICKMAN MD         SYSTEM ID:  [id number]       Medications - No data to display    Assessments & Plan (with Medical Decision Making)     I have reviewed the nursing notes.    I have reviewed the findings, diagnosis, plan and need for follow up with the patient.    HEART Score  Background  Calculates the overall risk of adverse event in patient's presenting with chest pain.  Based on 5 criteria (each assigned 0-2 points) including suspiciousness of history, EKG, age, risk factors and troponin.    Data  52 year old female  has CARDIOVASCULAR SCREENING; LDL GOAL LESS THAN 160; Blood type O+; Attention deficit disorder of adult; Major depressive disorder, recurrent episode, mild (H); Thyroid nodule; Seasonal affective disorder (H); Family history of diabetes mellitus; S/P thyroidectomy; BMI 28.0-28.9,adult; and Other specified noninflammatory disorder of cervix on their problem list.   reports that she has never smoked. She has never used smokeless tobacco.  family history includes C.A.D. in her father and paternal grandfather; Coronary Artery Disease in her father; Diabetes in her father and paternal grandfather; Heart Disease in her mother.  Lab Results   Component Value Date    TROPI  02/26/2017     <0.015  The 99th percentile for upper reference range is 0.045 ug/L.  Troponin values in   the range of 0.045 - 0.120 ug/L may be associated with risks of adverse   clinical events.       Criteria   0-2 points for each of 5 items (maximum of 10 points):  Score 0- History slightly suspicious for coronary syndrome  Score 0- EKG Normal  Score 1- Age 45  to 65 years old  Score 0- No risk factors for atherosclerotic disease  Score 0- Within normal limits for troponin levels  Interpretation  Risk of adverse outcome  Heart Score: 1  Total Score 0-3- Adverse Outcome Risk 2.5% - Supports early discharge with appropriate follow-up          This patient presented to the emergency department reporting intermittent chest pain.  Heart score calculates out to 1, EKG is normal and troponin is less than 6 all decreasing suspicion for acute coronary syndrome or myocardial ischemia.  The patient has no risk factors for PE and no concerning findings on physical exam or history that would lead me to believe that her pain is secondary to this.  Likewise, presentation raises no red flags for the possibility of aortic dissection.  No signs of pneumonia, pneumothorax, pleural effusion or cardiomegaly on CXR and no EKG changes consistent with pericarditis.  At this point time, I am comfortable discharging the patient and will have her follow-up with her clinic next week if needed.  She was told to return to the emergency department for worsening symptoms or any other problems and was discharged in good condition.      New Prescriptions    No medications on file       Final diagnoses:   Chest pain, unspecified type       3/10/2023   [facility] EMERGENCY DEPT     Matias Jacobs MD  03/10/23 9916

## 2023-03-10 NOTE — DISCHARGE INSTRUCTIONS
Follow-up with your clinic next week if you continue to have issues with intermittent chest discomfort.    Return to the emergency department for fevers, rashes, shortness of breath, worsening pain or pain that does not go away, or any other problems.   No

## 2023-03-31 ENCOUNTER — NURSE TRIAGE (OUTPATIENT)
Dept: NURSING | Facility: CLINIC | Age: 53
End: 2023-03-31
Payer: COMMERCIAL

## 2023-03-31 NOTE — TELEPHONE ENCOUNTER
Tested positive for covid on 3/27.  Was in the ED on 3/10.  Calling to make a follow up appointment from ED visit.  Patient calling to see when she can be seen in the clinic.  Can make an appointment anytime after April 5th.  Decline triage for covid.  Transferred to scheduling.    Nargis Crenshaw RN, MA  [facility] Triage Nurse Advisor      Reason for Disposition    Information only question and nurse able to answer    Protocols used: INFORMATION ONLY CALL - NO TRIAGE-A-OH

## 2023-04-10 ENCOUNTER — OFFICE VISIT (OUTPATIENT)
Dept: FAMILY MEDICINE | Facility: CLINIC | Age: 53
End: 2023-04-10
Payer: COMMERCIAL

## 2023-04-10 VITALS
HEART RATE: 77 BPM | DIASTOLIC BLOOD PRESSURE: 82 MMHG | RESPIRATION RATE: 18 BRPM | WEIGHT: 214.4 LBS | SYSTOLIC BLOOD PRESSURE: 124 MMHG | OXYGEN SATURATION: 98 % | BODY MASS INDEX: 33.08 KG/M2 | TEMPERATURE: 98.5 F

## 2023-04-10 DIAGNOSIS — Z12.31 VISIT FOR SCREENING MAMMOGRAM: ICD-10-CM

## 2023-04-10 DIAGNOSIS — R07.9 CHEST PAIN, UNSPECIFIED TYPE: Primary | ICD-10-CM

## 2023-04-10 PROCEDURE — 99203 OFFICE O/P NEW LOW 30 MIN: CPT | Performed by: NURSE PRACTITIONER

## 2023-04-10 ASSESSMENT — ENCOUNTER SYMPTOMS
BREAST MASS: 0
LIGHT-HEADEDNESS: 0
VOMITING: 0
PALPITATIONS: 0
CHEST TIGHTNESS: 0
FATIGUE: 0
ACTIVITY CHANGE: 0
NAUSEA: 0
UNEXPECTED WEIGHT CHANGE: 0
APPETITE CHANGE: 0
DIZZINESS: 0
FEVER: 0
DIAPHORESIS: 0
SHORTNESS OF BREATH: 0
COUGH: 0
CHILLS: 0

## 2023-04-10 ASSESSMENT — PATIENT HEALTH QUESTIONNAIRE - PHQ9
SUM OF ALL RESPONSES TO PHQ QUESTIONS 1-9: 0
10. IF YOU CHECKED OFF ANY PROBLEMS, HOW DIFFICULT HAVE THESE PROBLEMS MADE IT FOR YOU TO DO YOUR WORK, TAKE CARE OF THINGS AT HOME, OR GET ALONG WITH OTHER PEOPLE: NOT DIFFICULT AT ALL
SUM OF ALL RESPONSES TO PHQ QUESTIONS 1-9: 0

## 2023-04-10 ASSESSMENT — PAIN SCALES - GENERAL: PAINLEVEL: NO PAIN (0)

## 2023-04-10 NOTE — PATIENT INSTRUCTIONS
Make appointment with cardiology for Stress ECHO for further evaluation of the heart.  I will notify you of results.

## 2023-04-10 NOTE — PROGRESS NOTES
Assessment & Plan     Chest pain, unspecified type  Chest pain stable. Sxs are intermittent without any associated nausea, diaphoresis, or radiation to her back/arm suspicious for MI. Vital signs stable with normal S1S2, regular rhythm, and 2+ radial pulses bilaterally. Echo stress order placed due to dyspnea with exertion when running after her kids this past weekend for further evaluation of ongoing chest discomfort. Considered costochondritis, but no known injury/exercise to cause muscle strain. Also considered GERD, but patient denies any burning pain or acid reflux hx.   - Echocardiogram Exercise Stress; Future    Visit for screening mammogram  - MA SCREENING DIGITAL BILAT - Future  (s+30); Future    See Patient Instructions    Note by:Felicity Blackwood NP student    I, Keerthi Florez, was present with the NP student who participated in the service and in the documentationof the note.   I have verified the history and personally performed the physical exam and medical decision making.  I agree with the assessment and plan of care as documented in the note.     Keerthi Florez NP on 4/10/2023 at 12:55 PM    Keerthi Florez NP  [facility]    Shadi Cordero is a 53 year old, presenting for the following health issues:        4/10/2023     9:52 AM   Additional Questions   Roomed by Shila Ramirez CMA     [facility]     ED/UC Followup:    Facility:  [facility] ED   Date of visit: 3/10/2023  Reason for visit: Chest pain, left upper.   Current Status: Pain has persisted in left upper chest area, ongoing. Covid + 3/28/2023, mild symptoms. New cough started on 4/4/2023, coughing fits that are uncontrollable. Shortness of breath with activity, noticed yesterday. Chest feels heavy.   Noted to have high blood pressure in the ED, has monitored it at home since and have been reading normal. (116/75, 124/82, 124/82, 126/86, 130/89, 128/95, 122/93)     HPI: 53 year old  female with PMH of thyroidectomy in 2016, MDD, and adult attention deficit  disorder presents to clinic for ER follow-up. She was seen in the ER on 3/10 due to 1.5 weeks of intermittent chest soreness. She presented to the ER hypertensive up to 193/115. An EKG was performed and read as normal sinus rhythm. She also had a normal chest x-ray and negative troponin level. Her BP came down naturally to 142/93 in the ER and patient was told to follow-up with her primary care provider in one week (hypertension has since resolved). Patient also tested positive for Covid-19 on 3/28, but recovered quickly with mild symptoms. She then got another cough on 4/4 which is mostly resolved today. Today she reports ongoing intermittent (5 min, 1-2 times daily) left upper chest pain, close to the left sternal border that is sharp pulsing/throbbing in nature. She rates the pain at 3/10.  She denies any burning or symptoms of acid reflux and states the pain episodes come on at anytime, unrelated to rest, activity, or meals. She denies feeling any pain at all yesterday or today, but noticed some shortness of breath when running after her kids this past weekend. She has not had any other periods or dyspnea with her typical activities. She can not identify any known possible injury/cause of muscle pain. She has no prior history of cardiac or lung disease and goes not use any tobacco products.     Review of Systems   Constitutional: Negative for activity change, appetite change, chills, diaphoresis, fatigue, fever and unexpected weight change.   Respiratory: Negative for cough, chest tightness and shortness of breath.    Cardiovascular: Negative for chest pain, palpitations and peripheral edema.   Gastrointestinal: Negative for nausea and vomiting.   Breasts:  Negative for tenderness, breast mass and discharge.   Neurological: Negative for dizziness and light-headedness.          Objective    /82   Pulse 77   Temp 98.5  F (36.9  C)  (Tympanic)   Resp 18   Wt 97.3 kg (214 lb 6.4 oz)   SpO2 98%   BMI 33.08 kg/m    Body mass index is 33.08 kg/m .  Physical Exam  Constitutional:       General: She is not in acute distress.     Appearance: Normal appearance. She is not ill-appearing or diaphoretic.   HENT:      Head: Normocephalic.   Cardiovascular:      Rate and Rhythm: Normal rate and regular rhythm.      Pulses: Normal pulses.      Heart sounds: No murmur heard.     No friction rub. No gallop.   Pulmonary:      Effort: Pulmonary effort is normal. No respiratory distress.      Breath sounds: Normal breath sounds. No wheezing, rhonchi or rales.   Skin:     General: Skin is warm and dry.      Capillary Refill: Capillary refill takes less than 2 seconds.   Neurological:      General: No focal deficit present.      Mental Status: She is alert and oriented to person, place, and time.   Psychiatric:         Behavior: Behavior normal.         Thought Content: Thought content normal.         Judgment: Judgment normal.        Answers for HPI/ROS submitted by the patient on 4/10/2023  If you checked off any problems, how difficult have these problems made it for you to do your work, take care of things at home, or get along with other people?: Not difficult at all  PHQ9 TOTAL SCORE: 0

## 2023-04-22 ENCOUNTER — HEALTH MAINTENANCE LETTER (OUTPATIENT)
Age: 53
End: 2023-04-22

## 2023-05-01 ENCOUNTER — MYC MEDICAL ADVICE (OUTPATIENT)
Dept: FAMILY MEDICINE | Facility: CLINIC | Age: 53
End: 2023-05-01
Payer: COMMERCIAL

## 2023-05-02 ENCOUNTER — TELEPHONE (OUTPATIENT)
Dept: FAMILY MEDICINE | Facility: CLINIC | Age: 53
End: 2023-05-02
Payer: COMMERCIAL

## 2023-05-02 NOTE — TELEPHONE ENCOUNTER
Reason for Call:  Appointment Request    Patient requesting this type of appt: Chronic Diease Management/Medication/Follow-Up    Requested provider: follow up per PCP    Reason patient unable to be scheduled:no openings that work for pt    When does patient want to be seen/preferred time: between 8:30 and 1    Comments: none    Could we send this information to you in [facility] or would you prefer to receive a phone call?:   [phone] ok to leave message    Call taken on 5/2/2023 at 9:46 AM by Jessica Hernandes

## 2023-05-02 NOTE — TELEPHONE ENCOUNTER
Patient is reached and an appt is scheduled. MARIA E Maynard,     If your symptoms have persisted and your cough and chest symptoms have changed, maybe follow up with me prior to the stress testing.  Keep the appointment but see me first.    If you are unable to get a visit in the time needed, mychart message me.     KATY Villafana

## 2023-05-12 ENCOUNTER — OFFICE VISIT (OUTPATIENT)
Dept: FAMILY MEDICINE | Facility: CLINIC | Age: 53
End: 2023-05-12
Payer: COMMERCIAL

## 2023-05-12 VITALS
SYSTOLIC BLOOD PRESSURE: 142 MMHG | DIASTOLIC BLOOD PRESSURE: 92 MMHG | OXYGEN SATURATION: 98 % | HEART RATE: 89 BPM | RESPIRATION RATE: 18 BRPM | TEMPERATURE: 98.5 F | WEIGHT: 211 LBS | BODY MASS INDEX: 31.98 KG/M2 | HEIGHT: 68 IN

## 2023-05-12 DIAGNOSIS — R03.0 ELEVATED BLOOD PRESSURE READING WITHOUT DIAGNOSIS OF HYPERTENSION: ICD-10-CM

## 2023-05-12 DIAGNOSIS — R07.9 CHEST PAIN, UNSPECIFIED TYPE: ICD-10-CM

## 2023-05-12 DIAGNOSIS — R05.9 COUGH, UNSPECIFIED TYPE: Primary | ICD-10-CM

## 2023-05-12 DIAGNOSIS — R06.09 DYSPNEA ON EXERTION: ICD-10-CM

## 2023-05-12 PROCEDURE — 99214 OFFICE O/P EST MOD 30 MIN: CPT | Performed by: NURSE PRACTITIONER

## 2023-05-12 RX ORDER — PREDNISONE 20 MG/1
TABLET ORAL
Qty: 11 TABLET | Refills: 0 | Status: SHIPPED | OUTPATIENT
Start: 2023-05-12 | End: 2023-05-21

## 2023-05-12 ASSESSMENT — PAIN SCALES - GENERAL: PAINLEVEL: NO PAIN (0)

## 2023-05-12 NOTE — PROGRESS NOTES
"  Assessment & Plan     Cough, unspecified type  Non productive - not associated with any other symptoms. Ongoing since end of Feb and changed/worsened since COVID in March.  Likely post viral or allergies. Prednisone ordered for ? Post viral cough.    - predniSONE (DELTASONE) 20 MG tablet  Dispense: 11 tablet; Refill: 0  - CT Chest Pulmonary Embolism w Contrast    Chest pain, unspecified type  Likely pleuritis. Low risk for PE - non smoker, no personal or family history of clotting disorder.  CT ordered. Apply heat and NSAIDs for inflammation.    - predniSONE (DELTASONE) 20 MG tablet  Dispense: 11 tablet; Refill: 0  - CT Chest Pulmonary Embolism w Contrast    Dyspnea on exertion  Uncertain etiology. ? Related to anxiety, post viral or other.    Stress testing scheduled for next week.  Low risk cardiac cause.    - CT Chest Pulmonary Embolism w Contrast    Elevated blood pressure reading without diagnosis of hypertension  Continue monitoring at home.       30 minutes spent by me on the date of the encounter doing chart review, history and exam, documentation and further activities per the note        BMI:   Estimated body mass index is 32.56 kg/m  as calculated from the following:    Height as of this encounter: 1.715 m (5' 7.5\").    Weight as of this encounter: 95.7 kg (211 lb).   Weight management plan: Discussed healthy diet and exercise guidelines    See Patient Instructions    Nano Quinonez NP  [facility]    Shadi Cordero is a 53 year old, presenting for the following health issues:  Chest Pain and Cough        5/12/2023     2:04 PM   Additional Questions   Roomed by Keiko RIDDLE MA   Accompanied by Self       History of Present Illness       Reason for visit:  Chest pain and cough    She eats 4 or more servings of fruits and vegetables daily.She consumes 0 sweetened beverage(s) daily.She exercises with enough effort to increase her heart rate 9 or less minutes per day.  She exercises " "with enough effort to increase her heart rate 3 or less days per week.   She is taking medications regularly.     Chest Pain  Onset/Duration: On-going for about 2+ months-has a stress test scheduled for next Friday  Description:   Location: Was initially only on the left side of chest, a pulsing pain. Now seems to be more on the right, seems like a solid and deep soreness. Will sometimes still have pain on the left side.  Character: Pulsing, dull ache, deep soreness, sharp  Radiation: Has only had radiating pain one time, right side and moved down shoulder and back, otherwise no radiating pain  Duration: Constant right now on the right side  Intensity: moderate  Progression of Symptoms: Same  Accompanying Signs & Symptoms:  Shortness of breath: YES-with exertion  Sweating: No  Nausea/vomiting: No  Lightheadedness: No  Palpitations: No  Fever/Chills: No  Cough: YES-has had a cough since end of Feb, has lessened but still present           Heartburn: YES-but only when she drinks coffee  History:   Family history of heart disease: YES  Tobacco use: No  Previous similar symptoms: no   Precipitating factors:   Worse with exertion: YES  Worse with deep breaths: No           Related to eating: No           Better with burping: No  Alleviating factors: mild  Therapies tried and outcome: Initially thought it was related to anxiety-so tried deep breathing and meditation at first, otherwise nothing else  Left sided pain resolving, right sided chest pain started.  No worsening or change in pain with activity.  More shortness of breath, cough non productive.  Started swimming and noticed \"whistling\" when breathing.    Patient was seen in ED and again in clinic over the past 2 months.  ED visit was 3/10 - for chest pain   COVID positive 3/28 with mild symptoms. Cough started after.  Shortness of breath with exertion.  Echo stress testing was ordered in clinic follow up visit 4/10.    Chest xray done in ED and was " "negative.    Blood pressures at home 120/70-80's    Review of Systems   Constitutional, HEENT, cardiovascular, pulmonary, GI, , musculoskeletal, neuro, skin, endocrine and psych systems are negative, except as otherwise noted.      Objective    BP (!) 158/102   Pulse 89   Temp 98.5  F (36.9  C) (Tympanic)   Resp 18   Ht 1.715 m (5' 7.5\")   Wt 95.7 kg (211 lb)   SpO2 98%   BMI 32.56 kg/m    Body mass index is 32.56 kg/m .   BP Readings from Last 6 Encounters:   05/12/23 (!) 158/102   04/10/23 124/82   03/10/23 (!) 157/102   12/02/19 130/76   10/23/19 134/84   03/07/19 118/80       Physical Exam   GENERAL: healthy, alert and no distress  NECK: no adenopathy, no asymmetry, masses, or scars and thyroid normal to palpation  RESP: lungs clear to auscultation - no rales, rhonchi or wheezes  CV: regular rate and rhythm, normal S1 S2, no S3 or S4, no murmur, click or rub, no peripheral edema and peripheral pulses strong  ABDOMEN: soft, nontender, no hepatosplenomegaly, no masses and bowel sounds normal  MS: no gross musculoskeletal defects noted, no edema    Echo stress testing scheduled for next week.        "

## 2023-05-16 ENCOUNTER — MYC MEDICAL ADVICE (OUTPATIENT)
Dept: FAMILY MEDICINE | Facility: CLINIC | Age: 53
End: 2023-05-16
Payer: COMMERCIAL

## 2023-05-23 ENCOUNTER — TELEPHONE (OUTPATIENT)
Dept: FAMILY MEDICINE | Facility: CLINIC | Age: 53
End: 2023-05-23
Payer: COMMERCIAL

## 2023-05-23 NOTE — TELEPHONE ENCOUNTER
Eugenia is calling from [facility]. Patient was ordered for a Chest CT and is scheduled to have it done on 5/30/23 at their location. Eugenia reports that patient's insurance [facility] is requesting a prior authority from ordering provider in order for her to have scan done.    Will route to ordering provider, Nano Quinonez and care team.  Will route to Aminata Salgado RN

## 2023-05-25 NOTE — TELEPHONE ENCOUNTER
Called Eugenia and gave her the number for the [employer] [phone].     Katlyn Head PSC on 5/25/2023 at 11:19 AM

## 2023-05-30 ENCOUNTER — TRANSFERRED RECORDS (OUTPATIENT)
Dept: HEALTH INFORMATION MANAGEMENT | Facility: CLINIC | Age: 53
End: 2023-05-30
Payer: COMMERCIAL

## 2023-06-02 ENCOUNTER — HEALTH MAINTENANCE LETTER (OUTPATIENT)
Age: 53
End: 2023-06-02

## 2023-07-06 ENCOUNTER — TELEPHONE (OUTPATIENT)
Dept: FAMILY MEDICINE | Facility: CLINIC | Age: 53
End: 2023-07-06
Payer: COMMERCIAL

## 2023-07-06 NOTE — TELEPHONE ENCOUNTER
Patient Quality Outreach    Patient is due for the following:   Colon Cancer Screening  Breast Cancer Screening - Mammogram  Cervical Cancer Screening - PAP Needed  Physical Preventive Adult Physical    Next Steps:   Schedule physical, discuss HM at appt.     Type of outreach:    Sent [employer] message.      Questions for provider review:    None           Lourdes Richardson

## 2023-07-30 NOTE — NURSING NOTE
"Chief Complaint   Patient presents with     Mantoux Results Reading       Initial /89 (BP Location: Right arm, Patient Position: Chair, Cuff Size: Adult Regular)  Pulse 83  Temp 98.3  F (36.8  C) (Oral)  Resp 16  Ht 5' 7.25\" (1.708 m)  Wt 210 lb (95.3 kg)  BMI 32.65 kg/m2 Estimated body mass index is 32.65 kg/(m^2) as calculated from the following:    Height as of this encounter: 5' 7.25\" (1.708 m).    Weight as of this encounter: 210 lb (95.3 kg).  Medication Reconciliation: complete  " 23

## 2023-10-30 ENCOUNTER — TELEPHONE (OUTPATIENT)
Dept: FAMILY MEDICINE | Facility: CLINIC | Age: 53
End: 2023-10-30
Payer: COMMERCIAL

## 2023-10-30 NOTE — TELEPHONE ENCOUNTER
Patient Quality Outreach    Patient is due for the following:   Colon Cancer Screening  Cervical Cancer Screening - PAP Needed  Physical Preventive Adult Physical    Next Steps:   Schedule a Adult Preventative    Type of outreach:    Sent [employer] message.      Questions for provider review:    None           Mathew Cadena, CMA

## 2024-02-08 ENCOUNTER — TELEPHONE (OUTPATIENT)
Dept: FAMILY MEDICINE | Facility: CLINIC | Age: 54
End: 2024-02-08
Payer: COMMERCIAL

## 2024-02-08 NOTE — LETTER
February 8, 2024      Consuelo Medellin  [address] [address] [place] [id number]        Dear Consuelo,       Your team at [facility] cares about your health. We have reviewed your chart and based on our findings; we are making the following recommendations to better manage your health.     You are in particular need of attention regarding the following:     Schedule a primary care office visit with your provider for a Pap Smear to screen for Cervical Cancer.  Call or MyChart message your clinic to schedule a colonoscopy, schedule/ a FIT Test, or order a Cologuard test. If you are unsure what type of test you need, please call your clinic and speak to clinic staff.   Colon cancer is now the second leading cause of cancer-related deaths in the [place] for both men and women and there are over 130,000 new cases and 50,000 deaths per year from colon cancer. Colonoscopies can prevent 90-95% of these deaths. Problem lesions can be removed before they ever become cancer. This test is not only looking for cancer, but also getting rid of precancerous lesions.   If you are under/uninsured, we recommend you contact the [facility] Program.[facility] is a free colorectal cancer screening program that provides colonoscopies for eligible under/uninsured [place] men and women. If you are interested in receiving a free colonoscopy, please call [facility] at t [phone] (mention code [employer]) to see if you're eligible. Please have them send us the results.   PREVENTATIVE VISIT: Physical    If you have already completed these items, please contact the clinic via phone or   [employer] so your care team can review and update your records. Thank you for   choosing [facility] for your healthcare needs. For any questions,   concerns, or to schedule an appointment please contact our clinic.      Sincerely,      Nano Quinonez, DNP

## 2024-02-08 NOTE — TELEPHONE ENCOUNTER
Patient Quality Outreach    Patient is due for the following:   Colon Cancer Screening  Cervical Cancer Screening - PAP Needed  Physical Preventive Adult Physical  ( Declines Mammo)   Next Steps:   Schedule a Adult Preventative    Type of outreach:    Sent letter.      Questions for provider review:    None           Mathew Cadena, CMA

## 2024-03-10 NOTE — H&P
[facility]  [facility]  [address]  [facility] [address]  [phone]  Dept: [phone]    OPHTHALMOLOGY PRE-OPERATIVE  HISTORY AND PHYSICAL    DATE OF H/P:  2018    DATE OF SURGERY:  May 7, 2018  PROCEDURE:  Procedure(s):  PHACOEMULSIFICATION WITH STANDARD INTRAOCULAR LENS IMPLANT, Left Eye  LENS IMPLANT:  ZCB00 +21.5  REFRACTIVE GOAL:  PL Sph  SURGEON:  Sukh Mackey MD    ANESTHESIA:  TOPICAL / MAC    OR CASE REQUIREMENTS:  S/p LASIK.    DEMOGRAPHICS:  Demographic Information on Consuelo Medellin:    Consuelo Medellin  Gender: female  : 1970  [address] [facility] [id number] [phone] (home) NONE (work)    Medical Record: [id number]  Social Security Number: [id number]  Pharmacy:    [facility] [address] [facility] [address] [facility] [address] [address]  [facility]        Primary Care Provider: Nano Quinonez    Parent's names are: Data Unavailable (mother) and Data Unavailable (father).    Insurance: Payor: BCBS / Plan: [facility] / Product Type: Indemnity /     OCULAR HISTORY:  Cataracts OS>OD.  S/p LASIK.    HISTORIES:  Past Medical History:   Diagnosis Date     Gestational diabetes      Miscarriage 2010     Other ear problems     YOUNG CHILD     Thyroid disease     thyroid nodule       Past Surgical History:   Procedure Laterality Date     GYN SURGERY      C section     HERNIORRHAPHY INGUINAL  10/26/2011    Procedure:HERNIORRHAPHY INGUINAL; Right Inguinal Herniorrhapy with Mesh Placement; Surgeon:ABBIE SHIELDS; Location:[facility] OR     HERNIORRHAPHY VENTRAL N/A 2017    Procedure: HERNIORRHAPHY VENTRAL;  Surgeon: Stevie Bangura MD;  Location: [facility] OR     PE TUBES       THYROIDECTOMY Left 3/8/2016    Procedure: THYROIDECTOMY;  Surgeon: Abbie Ornelas MD;  Location:  OR       Family  Physician notified by staff. History   Problem Relation Age of Onset     HEART DISEASE Mother      arrythmia     DIABETES Father      C.A.D. Father      Coronary Artery Disease Father      C.A.D. Paternal Grandfather      DIABETES Paternal Grandfather        Social History   Substance Use Topics     Smoking status: Never Smoker     Smokeless tobacco: Never Used     Alcohol use Yes      Comment: rare       MEDICATIONS:  No current facility-administered medications for this encounter.      Current Outpatient Prescriptions   Medication Sig     buPROPion (WELLBUTRIN XL) 150 MG 24 hr tablet Take 1 tablet (150 mg) by mouth every morning (Needs follow-up appointment for this medication)       ALLERGIES:     Allergies   Allergen Reactions     Nkda [No Known Drug Allergies]      Wasps [Hornets] Nausea and Vomiting and Hives       PERTINENT SYSTEMS REVIEW:    1. No - Do you have a history of heart attack, stroke, stent, bypass or surgery on an artery in the head, neck, heart or legs?  2. No - Do you ever have any pain or discomfort in your chest?  3. No - Do you have a history of  Heart Failure?  4. No - Are you troubled by shortness of breath when walking: On the level, up a slight hill or at night?  5. No - Do you currently have a cold, bronchitis or other respiratory infection?  6. No - Do you have a cough, shortness of breath or wheezing?  7. No - Do you sometimes get pains in the calves of your legs when you walk?  8. No - Do you or anyone in your family have previous history of blood clots?  9. No - Do you or does anyone in your family have a serious bleeding problem such as prolonged bleeding following surgeries or cuts?  10. No - Have you ever had problems with anemia or been told to take iron pills?  11. No - Have you had any abnormal blood loss such as black, tarry or bloody stools, or abnormal vaginal bleeding?  12. No - Have you ever had a blood transfusion?  13. Yes: postoperative nausea - Have you or any of your relatives ever had problems  with anesthesia?  14. No - Do you have sleep apnea, excessive snoring or daytime drowsiness?  15. No - Do you have any prosthetic heart valves?  16. No - Do you have prosthetic joints?    EXAMINATION:  Vitals were reviewed                     Vison:  Va, right - 20/25, left - 20/160;   BAT, right - 20/60, left - 20/200;  HEENT:  Cataract, otherwise unremarkable.  LUNGS:  Clear  CV:  Regular rate and rhythm without murmur  ABD:  Soft and nontender  NEURO:  Alert and nonfocal    IMPRESSION:  Patient cleared for ophthalmic surgery.  Low risk with monitored, light sedation.  I have assessed the patient's DVT risk, and no additional orders necessary.    PLAN:  Procedure(s):  PHACOEMULSIFICATION WITH STANDARD INTRAOCULAR LENS IMPLANT, Left Eye      Sukh Mackey MD

## 2024-06-29 ENCOUNTER — HEALTH MAINTENANCE LETTER (OUTPATIENT)
Age: 54
End: 2024-06-29

## 2024-09-27 ENCOUNTER — VIRTUAL VISIT (OUTPATIENT)
Dept: FAMILY MEDICINE | Facility: CLINIC | Age: 54
End: 2024-09-27
Payer: COMMERCIAL

## 2024-09-27 DIAGNOSIS — F98.8 ATTENTION DEFICIT DISORDER OF ADULT: Primary | ICD-10-CM

## 2024-09-27 DIAGNOSIS — R03.0 ELEVATED BLOOD PRESSURE READING WITHOUT DIAGNOSIS OF HYPERTENSION: ICD-10-CM

## 2024-09-27 DIAGNOSIS — F33.8 SEASONAL AFFECTIVE DISORDER (H): ICD-10-CM

## 2024-09-27 DIAGNOSIS — F33.0 MAJOR DEPRESSIVE DISORDER, RECURRENT EPISODE, MILD (H): ICD-10-CM

## 2024-09-27 PROCEDURE — 99214 OFFICE O/P EST MOD 30 MIN: CPT | Mod: 95 | Performed by: NURSE PRACTITIONER

## 2024-09-27 RX ORDER — LISDEXAMFETAMINE DIMESYLATE 10 MG/1
10 CAPSULE ORAL EVERY MORNING
Qty: 30 CAPSULE | Refills: 0 | Status: SHIPPED | OUTPATIENT
Start: 2024-09-27

## 2024-09-27 ASSESSMENT — PATIENT HEALTH QUESTIONNAIRE - PHQ9: SUM OF ALL RESPONSES TO PHQ QUESTIONS 1-9: 7

## 2024-09-27 NOTE — PROGRESS NOTES
Consuelo is a 54 year old who is being evaluated via a billable video visit.    How would you like to obtain your AVS? MyChart  If the video visit is dropped, the invitation should be resent by: Text to cell phone: [phone]  Will anyone else be joining your video visit? No      Assessment & Plan     Attention deficit disorder of adult  The risks, benefits and treatment options of prescribed medications or other treatments have been discussed with the patient. The patient verbalized their understanding and should call or follow up if no improvement or if they develop further problems.      Discussed pharm and non pharm treatment for ADHD    - lisdexamfetamine (VYVANSE) 10 MG capsule  Dispense: 30 capsule; Refill: 0    Major depressive disorder, recurrent episode, mild (H)  Stable.    Seasonal affective disorder (H)  Stable.    Elevated blood pressure reading without diagnosis of hypertension  Monitor once starting the stimulant        Follow up 1 month to recheck medication, monitoring blood pressure   See Patient Instructions    Subjective   Consuelo is a 54 year old, presenting for the following health issues:  A.D.H.D (Patient would like to discuss starting an ADHD medication. Patient noticed about 2 months she noticed that she is getting more forgetful, mixed up and struggling with daily activities. Patient has had ADHD since childhood and has done self therapy to help and hasn't needed medication. Patient has tried increased water, exercise. Patient feels like she is running like a motor. Patient has declined any depression or anxiety concerns. )      9/27/2024     8:57 AM   Additional Questions   Roomed by Kyra Tenorio     HPI     Depression and Anxiety   How are you doing with your depression since your last visit? No change  How are you doing with your anxiety since your last visit?  No change  Are you having other symptoms that might be associated with depression or anxiety? No  Have you had a significant  "life event? No   Do you have any concerns with your use of alcohol or other drugs? No    Reports had testing done for ADHD in the past.  Has not been on medications for this condition.  Reports declined medication as she has \"high coping skills\"    Reports easily distraction, memory changes, organization challenges    Has tried exercise - 5-6 x per week, 10,000 steps, and increase water intake, decreased processed foods.  Difficulty with falling asleep due to wandering mind, and worrying     FH+ Daughter with mental health concerns.    Social History     Tobacco Use    Smoking status: Never    Smokeless tobacco: Never   Vaping Use    Vaping status: Never Used   Substance Use Topics    Alcohol use: Not Currently    Drug use: No         10/23/2019     9:10 AM 4/10/2023     9:48 AM 9/27/2024     8:59 AM   PHQ   PHQ-9 Total Score 2 0 7   Q9: Thoughts of better off dead/self-harm past 2 weeks Not at all Not at all Not at all         10/14/2016     1:07 PM 4/3/2017     1:33 PM 6/21/2018     9:00 AM   ROSY-7 SCORE   Total Score 0 0 0         9/27/2024     8:59 AM   Last PHQ-9   1.  Little interest or pleasure in doing things 0   2.  Feeling down, depressed, or hopeless 0   3.  Trouble falling or staying asleep, or sleeping too much 0   4.  Feeling tired or having little energy 1   5.  Poor appetite or overeating 0   6.  Feeling bad about yourself 0   7.  Trouble concentrating 3   8.  Moving slowly or restless 3   Q9: Thoughts of better off dead/self-harm past 2 weeks 0   PHQ-9 Total Score 7   Difficulty at work, home, or with people Very difficult         6/21/2018     9:00 AM   ROSY-7    1. Feeling nervous, anxious, or on edge 0   2. Not being able to stop or control worrying 0   3. Worrying too much about different things 0   4. Trouble relaxing 0   5. Being so restless that it is hard to sit still 0   6. Becoming easily annoyed or irritable 0   7. Feeling afraid, as if something awful might happen 0   ROSY-7 Total Score 0 "   If you checked any problems, how difficult have they made it for you to do your work, take care of things at home, or get along with other people? Not difficult at all       Suicide Assessment Five-step Evaluation and Treatment (SAFE-T)     How many servings of fruits and vegetables do you eat daily?  2-3  On average, how many sweetened beverages do you drink each day (Examples: soda, juice, sweet tea, etc.  Do NOT count diet or artificially sweetened beverages)?   0  How many days per week do you exercise enough to make your heart beat faster? 5  How many minutes a day do you exercise enough to make your heart beat faster? 30 - 60  How many days per week do you miss taking your medication? 0        Review of Systems  Constitutional, HEENT, cardiovascular, pulmonary, GI, , musculoskeletal, neuro, skin, endocrine and psych systems are negative, except as otherwise noted.      Objective           Vitals:  No vitals were obtained today due to virtual visit.    Physical Exam   GENERAL: alert and no distress  EYES: Eyes grossly normal to inspection.  No discharge or erythema, or obvious scleral/conjunctival abnormalities.  RESP: No audible wheeze, cough, or visible cyanosis.    SKIN: Visible skin clear. No significant rash, abnormal pigmentation or lesions.  NEURO: Cranial nerves grossly intact.  Mentation and speech appropriate for age.  PSYCH: Appropriate affect, tone, and pace of words           Video-Visit Details    Type of service:  Video Visit   Originating Location (pt. Location): Home    Distant Location (provider location):  On-site  Platform used for Video Visit: Corrina  Signed Electronically by: Nano Quinonez DNP

## 2024-10-25 ENCOUNTER — VIRTUAL VISIT (OUTPATIENT)
Dept: FAMILY MEDICINE | Facility: CLINIC | Age: 54
End: 2024-10-25
Payer: COMMERCIAL

## 2024-10-25 ENCOUNTER — TELEPHONE (OUTPATIENT)
Dept: FAMILY MEDICINE | Facility: CLINIC | Age: 54
End: 2024-10-25

## 2024-10-25 DIAGNOSIS — Z13.6 CARDIOVASCULAR SCREENING; LDL GOAL LESS THAN 130: ICD-10-CM

## 2024-10-25 DIAGNOSIS — I10 ESSENTIAL HYPERTENSION: ICD-10-CM

## 2024-10-25 DIAGNOSIS — E89.0 S/P THYROIDECTOMY: ICD-10-CM

## 2024-10-25 DIAGNOSIS — F33.0 MAJOR DEPRESSIVE DISORDER, RECURRENT EPISODE, MILD (H): Primary | ICD-10-CM

## 2024-10-25 DIAGNOSIS — F98.8 ATTENTION DEFICIT DISORDER OF ADULT: ICD-10-CM

## 2024-10-25 DIAGNOSIS — F98.8 ATTENTION DEFICIT DISORDER OF ADULT: Primary | ICD-10-CM

## 2024-10-25 PROCEDURE — 99214 OFFICE O/P EST MOD 30 MIN: CPT | Mod: 95 | Performed by: NURSE PRACTITIONER

## 2024-10-25 PROCEDURE — G2211 COMPLEX E/M VISIT ADD ON: HCPCS | Mod: 95 | Performed by: NURSE PRACTITIONER

## 2024-10-25 RX ORDER — LISDEXAMFETAMINE DIMESYLATE 20 MG/1
20 CAPSULE ORAL EVERY MORNING
Qty: 30 CAPSULE | Refills: 0 | Status: SHIPPED | OUTPATIENT
Start: 2024-10-25

## 2024-10-25 NOTE — TELEPHONE ENCOUNTER
Prior Authorization needed on LISDEXAMFETAMINE 20MG     Insurance Name RIGHTWAY     Insurance Phone # [phone]     Patients ID# [id number]     Please contact the pharmacy with Prior Auth status (approved/denied)    Thank you,  Darya Mc, [address] [id number] [phone]

## 2024-10-25 NOTE — PROGRESS NOTES
Consuelo is a 54 year old who is being evaluated via a billable video visit.    What phone number would you like to be contacted at? [phone]  How would you like to obtain your AVS? MyChart      Assessment & Plan     Major depressive disorder, recurrent episode, mild (H)  Stable - reviewed PHQ-9 today.  Denies SI reports    Attention deficit disorder of adult  Low dose ineffective - increase to 20 mg but closely monitor blood pressure at home.  Recheck in 1 month - recommend in person visit for next visit.    - lisdexamfetamine (VYVANSE) 20 MG capsule  Dispense: 30 capsule; Refill: 0    Essential hypertension  Readings at home are variable. If diastolic remains > 90 would recommend starting Ace/ARB    - Basic metabolic panel  (Ca, Cl, CO2, Creat, Gluc, K, Na, BUN)    S/P thyroidectomy  Recheck tsh - not on levothyroxine   - TSH with free T4 reflex    CARDIOVASCULAR SCREENING; LDL GOAL LESS THAN 130     - Lipid panel reflex to direct LDL Fasting              Work on weight loss  Regular exercise    The longitudinal plan of care for the diagnosis(es)/condition(s) as documented were addressed during this visit. Due to the added complexity in care, I will continue to support Consuelo in the subsequent management and with ongoing continuity of care.      Subjective   Consuelo is a 54 year old, presenting for the following health issues:  Hypertension (Patient feels like her blood pressure is running 130's/90 and overall not feeling great. ) and A.D.H.D (Patient Feels like her symptoms have worsen. She feels the need to have pops and eating more and not feeling great. Patient only side effect is missing her menstrual cycle. )        10/25/2024     9:02 AM   Additional Questions   Roomed by Kyra NIEVES.H.D    History of Present Illness       Reason for visit:  Medication follow up    She eats 4 or more servings of fruits and vegetables daily.She consumes 0 sweetened beverage(s) daily.She exercises with enough  effort to increase her heart rate 30 to 60 minutes per day.  She exercises with enough effort to increase her heart rate 5 days per week.   She is taking medications regularly.       Hypertension Follow-up    Do you check your blood pressure regularly outside of the clinic? Yes   Are you following a low salt diet? No  Are your blood pressures ever more than 140 on the top number (systolic) OR more   than 90 on the bottom number (diastolic), for example 140/90? Yes- Patients blood pressure has been running 129/95 and consistently 130's/90  How many servings of fruits and vegetables do you eat daily?  4 or more  On average, how many sweetened beverages do you drink each day (Examples: soda, juice, sweet tea, etc.  Do NOT count diet or artificially sweetened beverages)?   2  How many days per week do you exercise enough to make your heart beat faster? 10,000 steps daily  How many minutes a day do you exercise enough to make your heart beat faster? Walks 10,000 steps daily  How many days per week do you miss taking your medication? 0    ADHD- not improving.  Taking Vyvanse 10 mg   Did not having side effects.    Reports missed her LMP - no other hot flashes   Unsure if related Vyvanse.    Depression   How are you doing with your depression since your last visit? No change  Are you having other symptoms that might be associated with depression? No  Have you had a significant life event?  No   Are you feeling anxious or having panic attacks?   No  Do you have any concerns with your use of alcohol or other drugs? No    Social History     Tobacco Use    Smoking status: Never    Smokeless tobacco: Never   Vaping Use    Vaping status: Never Used   Substance Use Topics    Alcohol use: Not Currently    Drug use: No         10/23/2019     9:10 AM 4/10/2023     9:48 AM 9/27/2024     8:59 AM   PHQ   PHQ-9 Total Score 2 0 7   Q9: Thoughts of better off dead/self-harm past 2 weeks Not at all Not at all  Not at all        Patient-reported         10/14/2016     1:07 PM 4/3/2017     1:33 PM 6/21/2018     9:00 AM   ROSY-7 SCORE   Total Score 0 0 0         9/27/2024     8:59 AM   Last PHQ-9   1.  Little interest or pleasure in doing things 0   2.  Feeling down, depressed, or hopeless 0   3.  Trouble falling or staying asleep, or sleeping too much 0   4.  Feeling tired or having little energy 1   5.  Poor appetite or overeating 0   6.  Feeling bad about yourself 0   7.  Trouble concentrating 3   8.  Moving slowly or restless 3   Q9: Thoughts of better off dead/self-harm past 2 weeks 0   PHQ-9 Total Score 7   Difficulty at work, home, or with people Very difficult         6/21/2018     9:00 AM   ROSY-7    1. Feeling nervous, anxious, or on edge 0   2. Not being able to stop or control worrying 0   3. Worrying too much about different things 0   4. Trouble relaxing 0   5. Being so restless that it is hard to sit still 0   6. Becoming easily annoyed or irritable 0   7. Feeling afraid, as if something awful might happen 0   ROSY-7 Total Score 0   If you checked any problems, how difficult have they made it for you to do your work, take care of things at home, or get along with other people? Not difficult at all       Suicide Assessment Five-step Evaluation and Treatment (SAFE-T)     How many servings of fruits and vegetables do you eat daily?  4 or more  On average, how many sweetened beverages do you drink each day (Examples: soda, juice, sweet tea, etc.  Do NOT count diet or artificially sweetened beverages)?   0  How many days per week do you exercise enough to make your heart beat faster? 4  How many minutes a day do you exercise enough to make your heart beat faster? 20 - 29  How many days per week do you miss taking your medication? Not on medication for depression     Review of Systems  Constitutional, HEENT, cardiovascular, pulmonary, GI, , musculoskeletal, neuro, skin, endocrine and psych systems are negative, except as otherwise  noted.      Objective           Vitals:  No vitals were obtained today due to virtual visit.    Physical Exam   GENERAL: alert and no distress  EYES: Eyes grossly normal to inspection.  No discharge or erythema, or obvious scleral/conjunctival abnormalities.  RESP: No audible wheeze, cough, or visible cyanosis.    SKIN: Visible skin clear. No significant rash, abnormal pigmentation or lesions.  NEURO: Cranial nerves grossly intact.  Mentation and speech appropriate for age.  PSYCH: Appropriate affect, tone, and pace of words           Video-Visit Details    Type of service:  Video Visit   Originating Location (pt. Location): Home    Distant Location (provider location):  On-site  Platform used for Video Visit: Corrina  Signed Electronically by: Nano Quinonez DNP     [facility]

## 2024-10-28 NOTE — TELEPHONE ENCOUNTER
Sent [employer] inct message to patient to get active insurance information as her [place] Medicaid (ID [id number]) and her BCBS PMAP (ID [id number]) ended.

## 2024-10-29 NOTE — TELEPHONE ENCOUNTER
Patient responded with an [employer] card. That coverage ended on 12/31/2023. Still no active coverage found, pt notified. No active insurance on file, pharmacy has been notified and awaiting information from patient.

## 2024-10-29 NOTE — TELEPHONE ENCOUNTER
PA Initiation - Pt found update ins card    Medication: LISDEXAMFETAMINE DIMESYLATE 20 MG PO CAPS  Insurance Company: [employer]- Phone [phone] Fax [phone]  Pharmacy Filling the Rx: [facility] [place], [place] - [address]  Filling Pharmacy Phone: [phone]  Start Date: 10/29/2024

## 2024-11-01 RX ORDER — DEXTROAMPHETAMINE SACCHARATE, AMPHETAMINE ASPARTATE MONOHYDRATE, DEXTROAMPHETAMINE SULFATE AND AMPHETAMINE SULFATE 5; 5; 5; 5 MG/1; MG/1; MG/1; MG/1
20 CAPSULE, EXTENDED RELEASE ORAL DAILY
Qty: 30 CAPSULE | Refills: 0 | Status: SHIPPED | OUTPATIENT
Start: 2024-11-01

## 2024-11-01 NOTE — TELEPHONE ENCOUNTER
Spoke w pt she is in good understand and wants the Adderral sent to [facility] in [place].    Flakita Dempsey on 11/1/2024 at 11:29 AM

## 2024-11-01 NOTE — CONFIDENTIAL NOTE
Please notify patient that her insurance requires us to try and fail at an alternative medication such as Adderall.    We can do 1 month of the Adderall and then re-submit the prior authorization for the Vyvnase.    Let me know what she would like to do.  Nano Quinonez, DNP

## 2024-11-01 NOTE — TELEPHONE ENCOUNTER
PRIOR AUTHORIZATION DENIED    Medication: LISDEXAMFETAMINE DIMESYLATE 20 MG PO CAPS  Insurance Company: [employer]- Phone [phone] Fax [phone]  Denial Date: 11/1/2024  Denial Reason(s): No documentation as to try/fail preferred options      Appeal Information:       Patient Notified: No

## 2024-11-01 NOTE — CONFIDENTIAL NOTE
Prescription for Adderall 20 mg XR for 1 month sent.  Have patient message me in 2-3 weeks and will have to document if medication not effective or having side effects.  Then can re-prescribe vyvanse.    Nano Quinonez, DNP

## 2024-12-17 ENCOUNTER — TELEPHONE (OUTPATIENT)
Dept: FAMILY MEDICINE | Facility: CLINIC | Age: 54
End: 2024-12-17
Payer: COMMERCIAL

## 2024-12-17 NOTE — TELEPHONE ENCOUNTER
Patient Quality Outreach    Patient is due for the following:   Cervical Cancer Screening - PAP Needed  Physical Preventive Adult Physical    Action(s) Taken:   Schedule a Adult Preventative    Type of outreach:    Sent [employer] message.    Questions for provider review:    None           Mathew Cadena, CMA

## 2024-12-23 ENCOUNTER — LAB (OUTPATIENT)
Dept: LAB | Facility: CLINIC | Age: 54
End: 2024-12-23
Payer: COMMERCIAL

## 2024-12-23 DIAGNOSIS — Z13.6 CARDIOVASCULAR SCREENING; LDL GOAL LESS THAN 130: ICD-10-CM

## 2024-12-23 DIAGNOSIS — I10 ESSENTIAL HYPERTENSION: ICD-10-CM

## 2024-12-23 DIAGNOSIS — E89.0 S/P THYROIDECTOMY: ICD-10-CM

## 2024-12-23 LAB
ANION GAP SERPL CALCULATED.3IONS-SCNC: 11 MMOL/L (ref 7–15)
BUN SERPL-MCNC: 18.3 MG/DL (ref 6–20)
CALCIUM SERPL-MCNC: 9.3 MG/DL (ref 8.8–10.4)
CHLORIDE SERPL-SCNC: 105 MMOL/L (ref 98–107)
CHOLEST SERPL-MCNC: 198 MG/DL
CREAT SERPL-MCNC: 0.96 MG/DL (ref 0.51–0.95)
EGFRCR SERPLBLD CKD-EPI 2021: 70 ML/MIN/1.73M2
FASTING STATUS PATIENT QL REPORTED: YES
FASTING STATUS PATIENT QL REPORTED: YES
GLUCOSE SERPL-MCNC: 106 MG/DL (ref 70–99)
HCO3 SERPL-SCNC: 27 MMOL/L (ref 22–29)
HDLC SERPL-MCNC: 53 MG/DL
LDLC SERPL CALC-MCNC: 126 MG/DL
NONHDLC SERPL-MCNC: 145 MG/DL
POTASSIUM SERPL-SCNC: 4.1 MMOL/L (ref 3.4–5.3)
SODIUM SERPL-SCNC: 143 MMOL/L (ref 135–145)
TRIGL SERPL-MCNC: 95 MG/DL
TSH SERPL DL<=0.005 MIU/L-ACNC: 2.15 UIU/ML (ref 0.3–4.2)

## 2024-12-23 PROCEDURE — 80048 BASIC METABOLIC PNL TOTAL CA: CPT

## 2024-12-23 PROCEDURE — 84443 ASSAY THYROID STIM HORMONE: CPT

## 2024-12-23 PROCEDURE — 80061 LIPID PANEL: CPT

## 2024-12-23 PROCEDURE — 36415 COLL VENOUS BLD VENIPUNCTURE: CPT

## 2025-01-01 SDOH — HEALTH STABILITY: PHYSICAL HEALTH: ON AVERAGE, HOW MANY MINUTES DO YOU ENGAGE IN EXERCISE AT THIS LEVEL?: 30 MIN

## 2025-01-01 SDOH — HEALTH STABILITY: PHYSICAL HEALTH: ON AVERAGE, HOW MANY DAYS PER WEEK DO YOU ENGAGE IN MODERATE TO STRENUOUS EXERCISE (LIKE A BRISK WALK)?: 5 DAYS

## 2025-01-01 ASSESSMENT — ANXIETY QUESTIONNAIRES
IF YOU CHECKED OFF ANY PROBLEMS ON THIS QUESTIONNAIRE, HOW DIFFICULT HAVE THESE PROBLEMS MADE IT FOR YOU TO DO YOUR WORK, TAKE CARE OF THINGS AT HOME, OR GET ALONG WITH OTHER PEOPLE: NOT DIFFICULT AT ALL
1. FEELING NERVOUS, ANXIOUS, OR ON EDGE: NOT AT ALL
6. BECOMING EASILY ANNOYED OR IRRITABLE: NOT AT ALL
7. FEELING AFRAID AS IF SOMETHING AWFUL MIGHT HAPPEN: NOT AT ALL
2. NOT BEING ABLE TO STOP OR CONTROL WORRYING: NOT AT ALL
GAD7 TOTAL SCORE: 0
8. IF YOU CHECKED OFF ANY PROBLEMS, HOW DIFFICULT HAVE THESE MADE IT FOR YOU TO DO YOUR WORK, TAKE CARE OF THINGS AT HOME, OR GET ALONG WITH OTHER PEOPLE?: NOT DIFFICULT AT ALL
3. WORRYING TOO MUCH ABOUT DIFFERENT THINGS: NOT AT ALL
GAD7 TOTAL SCORE: 0
5. BEING SO RESTLESS THAT IT IS HARD TO SIT STILL: NOT AT ALL
4. TROUBLE RELAXING: NOT AT ALL
GAD7 TOTAL SCORE: 0
7. FEELING AFRAID AS IF SOMETHING AWFUL MIGHT HAPPEN: NOT AT ALL

## 2025-01-01 ASSESSMENT — PATIENT HEALTH QUESTIONNAIRE - PHQ9
SUM OF ALL RESPONSES TO PHQ QUESTIONS 1-9: 0
SUM OF ALL RESPONSES TO PHQ QUESTIONS 1-9: 0
10. IF YOU CHECKED OFF ANY PROBLEMS, HOW DIFFICULT HAVE THESE PROBLEMS MADE IT FOR YOU TO DO YOUR WORK, TAKE CARE OF THINGS AT HOME, OR GET ALONG WITH OTHER PEOPLE: NOT DIFFICULT AT ALL

## 2025-01-01 ASSESSMENT — SOCIAL DETERMINANTS OF HEALTH (SDOH): HOW OFTEN DO YOU GET TOGETHER WITH FRIENDS OR RELATIVES?: PATIENT DECLINED

## 2025-01-02 ENCOUNTER — OFFICE VISIT (OUTPATIENT)
Dept: FAMILY MEDICINE | Facility: CLINIC | Age: 55
End: 2025-01-02
Payer: COMMERCIAL

## 2025-01-02 VITALS
HEART RATE: 82 BPM | DIASTOLIC BLOOD PRESSURE: 88 MMHG | BODY MASS INDEX: 30.68 KG/M2 | HEIGHT: 68 IN | SYSTOLIC BLOOD PRESSURE: 132 MMHG | WEIGHT: 202.4 LBS | RESPIRATION RATE: 16 BRPM | OXYGEN SATURATION: 99 % | TEMPERATURE: 97.6 F

## 2025-01-02 DIAGNOSIS — I10 ESSENTIAL HYPERTENSION: ICD-10-CM

## 2025-01-02 DIAGNOSIS — Z12.4 CERVICAL CANCER SCREENING: ICD-10-CM

## 2025-01-02 DIAGNOSIS — Z83.3 FAMILY HISTORY OF DIABETES MELLITUS: ICD-10-CM

## 2025-01-02 DIAGNOSIS — Z12.31 VISIT FOR SCREENING MAMMOGRAM: ICD-10-CM

## 2025-01-02 DIAGNOSIS — F33.8 SEASONAL AFFECTIVE DISORDER (H): ICD-10-CM

## 2025-01-02 DIAGNOSIS — Z00.00 ROUTINE GENERAL MEDICAL EXAMINATION AT A HEALTH CARE FACILITY: Primary | ICD-10-CM

## 2025-01-02 DIAGNOSIS — N84.1 POLYP AT CERVICAL OS: ICD-10-CM

## 2025-01-02 DIAGNOSIS — F33.0 MAJOR DEPRESSIVE DISORDER, RECURRENT EPISODE, MILD (H): ICD-10-CM

## 2025-01-02 DIAGNOSIS — F98.8 ATTENTION DEFICIT DISORDER OF ADULT: ICD-10-CM

## 2025-01-02 RX ORDER — LISDEXAMFETAMINE DIMESYLATE 30 MG/1
30 CAPSULE ORAL DAILY
Qty: 30 CAPSULE | Refills: 0 | Status: SHIPPED | OUTPATIENT
Start: 2025-01-02 | End: 2025-02-01

## 2025-01-02 RX ORDER — LISDEXAMFETAMINE DIMESYLATE 30 MG/1
30 CAPSULE ORAL DAILY
Qty: 30 CAPSULE | Refills: 0 | Status: SHIPPED | OUTPATIENT
Start: 2025-02-01 | End: 2025-03-03

## 2025-01-02 RX ORDER — LISDEXAMFETAMINE DIMESYLATE 30 MG/1
30 CAPSULE ORAL DAILY
Qty: 30 CAPSULE | Refills: 0 | Status: SHIPPED | OUTPATIENT
Start: 2025-03-03 | End: 2025-04-02

## 2025-01-02 ASSESSMENT — PAIN SCALES - GENERAL: PAINLEVEL_OUTOF10: NO PAIN (0)

## 2025-01-02 NOTE — PROGRESS NOTES
"Preventive Care Visit  [facility]  Nano Quinonez DNP, Family Medicine  Jan 2, 2025      Assessment & Plan     Routine general medical examination at a health care facility     - HPV and Gynecologic Cytology Panel - Recommended Age 30 - 65 Years    Major depressive disorder, recurrent episode, mild (H)  Stable    Seasonal affective disorder (H)  improved    Essential hypertension  Continue to monitor- recheck blood pressure was normal. Home readings are at goal.  Follow-up if blood pressure not at goal at home    Polyp at cervical os  Follow-up with OB given size  - Ob/Gyn  Referral    Cervical cancer screening     - HPV and Gynecologic Cytology Panel - Recommended Age 30 - 65 Years    Visit for screening mammogram     - MA Screening Bilateral w/ Ruben    Family history of diabetes mellitus       Attention deficit disorder of adult  Discussed switching to Vyvanse given failure on Adderall - prior auth likely needed.  The risks, benefits and treatment options of prescribed medications or other treatments have been discussed with the patient. The patient verbalized their understanding and should call or follow up if no improvement or if they develop further problems.    - lisdexamfetamine (VYVANSE) 30 MG capsule  Dispense: 30 capsule; Refill: 0  - lisdexamfetamine (VYVANSE) 30 MG capsule  Dispense: 30 capsule; Refill: 0  - lisdexamfetamine (VYVANSE) 30 MG capsule  Dispense: 30 capsule; Refill: 0      Patient has been advised of split billing requirements and indicates understanding: Yes        BMI  Estimated body mass index is 31.23 kg/m  as calculated from the following:    Height as of this encounter: 1.715 m (5' 7.5\").    Weight as of this encounter: 91.8 kg (202 lb 6.4 oz).   Weight management plan: Discussed healthy diet and exercise guidelines    Counseling  Appropriate preventive services were addressed with this patient via screening, questionnaire, or discussion as " appropriate for fall prevention, nutrition, physical activity, Tobacco-use cessation, social engagement, weight loss and cognition.  Checklist reviewing preventive services available has been given to the patient.  Reviewed patient's diet, addressing concerns and/or questions.       See Patient Instructions    Shadi Cordero is a 54 year old, presenting for the following:  Physical and Health Maintenance (Declined Prevnar 20 and Tdap)        1/2/2025    10:22 AM   Additional Questions   Roomed by Nohemi JENNINGS CMA   Accompanied by Self         1/2/2025    10:22 AM   Patient Reported Additional Medications   Patient reports taking the following new medications None          HPI     Taking Adderall 25 mg XR once daily but feels like even with the dose adjustment in the past month or so that it is not lasting very long.  Gets 4 hours maximum of improvement in focus, concentration.  Does notice less food noise.  Would like dose adjustment or medication change.    Hypertension Follow-up    Do you check your blood pressure regularly outside of the clinic? Yes   Are you following a low salt diet? Yes  Are your blood pressures ever more than 140 on the top number (systolic) OR more   than 90 on the bottom number (diastolic), for example 140/90? No    Depression and Anxiety   How are you doing with your depression since your last visit? No change  How are you doing with your anxiety since your last visit?  No change  Are you having other symptoms that might be associated with depression or anxiety? No  Have you had a significant life event? No   Do you have any concerns with your use of alcohol or other drugs? No    Social History     Tobacco Use    Smoking status: Never     Passive exposure: Never    Smokeless tobacco: Never   Vaping Use    Vaping status: Never Used   Substance Use Topics    Alcohol use: Not Currently    Drug use: No         4/10/2023     9:48 AM 9/27/2024     8:59 AM 1/1/2025    11:38 AM   PHQ   PHQ-9 Total  Score 0 7 0    Q9: Thoughts of better off dead/self-harm past 2 weeks Not at all  Not at all Not at all       Patient-reported    Proxy-reported         4/3/2017     1:33 PM 6/21/2018     9:00 AM 1/1/2025    11:38 AM   ROSY-7 SCORE   Total Score   0 (minimal anxiety)   Total Score 0 0 0        Patient-reported         1/1/2025    11:38 AM   Last PHQ-9   1.  Little interest or pleasure in doing things 0   2.  Feeling down, depressed, or hopeless 0   3.  Trouble falling or staying asleep, or sleeping too much 0   4.  Feeling tired or having little energy 0   5.  Poor appetite or overeating 0   6.  Feeling bad about yourself 0   7.  Trouble concentrating 0   8.  Moving slowly or restless 0   Q9: Thoughts of better off dead/self-harm past 2 weeks 0   PHQ-9 Total Score 0        Patient-reported         1/1/2025    11:38 AM   ROSY-7    1. Feeling nervous, anxious, or on edge 0   2. Not being able to stop or control worrying 0   3. Worrying too much about different things 0   4. Trouble relaxing 0   5. Being so restless that it is hard to sit still 0   6. Becoming easily annoyed or irritable 0   7. Feeling afraid, as if something awful might happen 0   ROSY-7 Total Score 0    If you checked any problems, how difficult have they made it for you to do your work, take care of things at home, or get along with other people? Not difficult at all       Patient-reported       Suicide Assessment Five-step Evaluation and Treatment (SAFE-T)         Health Care Directive  Patient does not have a Health Care Directive: Discussed advance care planning with patient; however, patient declined at this time.      1/1/2025   General Health   How would you rate your overall physical health? Good   Feel stress (tense, anxious, or unable to sleep) Not at all         1/1/2025   Nutrition   Three or more servings of calcium each day? Yes   Diet: Vegetarian/vegan   How many servings of fruit and vegetables per day? 4 or more   How many sweetened  beverages each day? 0-1         1/1/2025   Exercise   Days per week of moderate/strenous exercise 5 days   Average minutes spent exercising at this level 30 min         1/1/2025   Social Factors   Frequency of gathering with friends or relatives Patient declined   Worry food won't last until get money to buy more No   Food not last or not have enough money for food? No   Do you have housing? (Housing is defined as stable permanent housing and does not include staying ouside in a car, in a tent, in an abandoned building, in an overnight shelter, or couch-surfing.) Yes   Are you worried about losing your housing? No   Lack of transportation? No   Unable to get utilities (heat,electricity)? No         1/1/2025   Fall Risk   Fallen 2 or more times in the past year? No   Trouble with walking or balance? No          1/1/2025   Dental   Dentist two times every year? Yes         1/1/2025   TB Screening   Were you born outside of the [place]? No       Today's PHQ-9 Score:       1/1/2025    11:38 AM   PHQ-9 SCORE   PHQ-9 Total Score MyChart 0   PHQ-9 Total Score 0        Patient-reported         1/1/2025   Substance Use   Alcohol more than 3/day or more than 7/wk Not Applicable   Do you use any other substances recreationally? No     Social History     Tobacco Use    Smoking status: Never     Passive exposure: Never    Smokeless tobacco: Never   Vaping Use    Vaping status: Never Used   Substance Use Topics    Alcohol use: Not Currently    Drug use: No          Mammogram Screening - Mammogram every 1-2 years updated in Health Maintenance based on mutual decision making          1/1/2025   One time HIV Screening   Previous HIV test? No         1/1/2025   STI Screening   New sexual partner(s) since last STI/HIV test? No     History of abnormal Pap smear: No - age 30- 64 PAP with HPV every 5 years recommended        Latest Ref Rng & Units 2/1/2016     8:30 AM 2/1/2016    12:00 AM 7/29/2010    12:00 AM   PAP / HPV   PAP (Historical)    NIL  NIL    HPV 16 DNA NEG Negative      HPV 18 DNA NEG Negative      Other HR HPV NEG Negative        ASCVD Risk   The 10-year ASCVD risk score (Kierra QUINTERO, et al., 2019) is: 2.1%    Values used to calculate the score:      Age: 54 years      Sex: Female      Is Non- : No      Diabetic: No      Tobacco smoker: No      Systolic Blood Pressure: 142 mmHg      Is BP treated: No      HDL Cholesterol: 53 mg/dL      Total Cholesterol: 198 mg/dL    Fracture Risk Assessment Tool  Link to Frax Calculator  Use the information below to complete the Frax calculator  : 1970  Sex: female  Weight (kg): 91.8 kg (actual weight)  Height (cm): 171.5 cm  Previous Fragility Fracture:  No  History of parent with fractured hip:  No  Current Smoking:  No  Patient has been on glucocorticoids for more than 3 months (5mg/day or more): No  Rheumatoid Arthritis on Problem List:  No  Secondary Osteoporosis on Problem List:  No  Consumes 3 or more units of alcohol per day: No  Femoral Neck BMD (g/cm2)           Reviewed and updated as needed this visit by Provider                    Past Medical History:   Diagnosis Date    Gestational diabetes     Miscarriage 2010    Other ear problems     YOUNG CHILD    Thyroid disease     thyroid nodule     Past Surgical History:   Procedure Laterality Date    ABDOMEN SURGERY      Hernia/    BIOPSY      Thyroid    GYN SURGERY      C section    HERNIORRHAPHY INGUINAL  10/26/2011    Procedure:HERNIORRHAPHY INGUINAL; Right Inguinal Herniorrhapy with Mesh Placement; Surgeon:ABBEI SHIELDS; Location:[facility]    HERNIORRHAPHY VENTRAL N/A 2017    Procedure: HERNIORRHAPHY VENTRAL;  Surgeon: Stevie Bangura MD;  Location: [facility] OR    PE TUBES      PHACOEMULSIFICATION WITH STANDARD INTRAOCULAR LENS IMPLANT Left 2018    Procedure: PHACOEMULSIFICATION WITH STANDARD INTRAOCULAR LENS IMPLANT;  Left cataract removal with implant;   Surgeon: Sukh Mackey MD;  Location: [facility]    PHACOEMULSIFICATION WITH STANDARD INTRAOCULAR LENS IMPLANT Right 2018    Procedure: PHACOEMULSIFICATION WITH STANDARD INTRAOCULAR LENS IMPLANT;  Right Cataract Removal with Implant;  Surgeon: Sukh Mackey MD;  Location: [facility]    THYROIDECTOMY Left 2016    Procedure: THYROIDECTOMY;  Surgeon: Abbie Ornelas MD;  [facility]     OB History    Para Term  AB Living   6 5 3 0 1 1   SAB IAB Ectopic Multiple Live Births   0 0 0 0 0      # Outcome Date GA Lbr Ino/2nd Weight Sex Type Anes PTL Lv   6 Term 2011 40w0d 14:49 3.969 kg (8 lb 12 oz)           Apgar1: 8  Apgar5: 8   5 AB 2010 5w0d          4 Para   04:40         3 Para   03:00         2 Term            1 Term              Lab work is in process  Labs reviewed in EPIC  BP Readings from Last 3 Encounters:   25 (!) 142/82   23 (!) 142/92   04/10/23 124/82    Wt Readings from Last 3 Encounters:   25 91.8 kg (202 lb 6.4 oz)   23 95.7 kg (211 lb)   04/10/23 97.3 kg (214 lb 6.4 oz)                  Patient Active Problem List   Diagnosis    CARDIOVASCULAR SCREENING; LDL GOAL LESS THAN 160    Blood type O+    Attention deficit disorder of adult    Major depressive disorder, recurrent episode, mild (H)    Thyroid nodule    Seasonal affective disorder (H)    Family history of diabetes mellitus    S/P thyroidectomy    Other specified noninflammatory disorder of cervix    Essential hypertension     Past Surgical History:   Procedure Laterality Date    ABDOMEN SURGERY      Hernia/    BIOPSY  2015    Thyroid    GYN SURGERY      C section    HERNIORRHAPHY INGUINAL  10/26/2011    Procedure:HERNIORRHAPHY INGUINAL; Right Inguinal Herniorrhapy with Mesh Placement; Surgeon:ABBIE SHIELDS; Location:[facility]    HERNIORRHAPHY VENTRAL N/A 2017    Procedure: HERNIORRHAPHY VENTRAL;  Surgeon: Stevie Bangura  Adam Wahl MD;  Location: [facility]    PE TUBES      PHACOEMULSIFICATION WITH STANDARD INTRAOCULAR LENS IMPLANT Left 05/07/2018    Procedure: PHACOEMULSIFICATION WITH STANDARD INTRAOCULAR LENS IMPLANT;  Left cataract removal with implant;  Surgeon: Sukh Mackey MD;  Location: [facility]    PHACOEMULSIFICATION WITH STANDARD INTRAOCULAR LENS IMPLANT Right 07/09/2018    Procedure: PHACOEMULSIFICATION WITH STANDARD INTRAOCULAR LENS IMPLANT;  Right Cataract Removal with Implant;  Surgeon: Sukh Mackey MD;  Location: [facility]    THYROIDECTOMY Left 03/08/2016    Procedure: THYROIDECTOMY;  Surgeon: Anitra Ornelas MD;  Location:  OR       Social History     Tobacco Use    Smoking status: Never     Passive exposure: Never    Smokeless tobacco: Never   Substance Use Topics    Alcohol use: Not Currently     Family History   Problem Relation Age of Onset    Heart Disease Mother         arrythmia    Atrial fibrillation Mother     Diabetes Father     C.A.D. Father     Coronary Artery Disease Father     C.A.D. Paternal Grandfather     Diabetes Paternal Grandfather          Current Outpatient Medications   Medication Sig Dispense Refill    amphetamine-dextroamphetamine (ADDERALL XR) 25 MG 24 hr capsule Take 1 capsule (25 mg) by mouth every morning. 30 capsule 0     Allergies   Allergen Reactions    Nkda [No Known Drug Allergy]     Wasps [Hornets] Nausea and Vomiting and Hives     Recent Labs   Lab Test 12/23/24  1030 03/10/23  1523 03/07/19  0735 12/31/18  0937 09/24/18  0816 06/21/18  0852 02/26/17  2050   A1C  --   --   --  5.4  --  5.6  --    *  --   --  58  --  91  --    HDL 53  --   --  55  --  45*  --    TRIG 95  --   --  44  --  128  --    ALT  --  14  --   --   --   --  21   CR 0.96* 0.94  --   --   --   --  0.83   GFRESTIMATED 70 73  --   --   --   --  74   GFRESTBLACK  --   --   --   --   --   --  90   POTASSIUM 4.1 4.3  --   --   --   --  4.2   TSH 2.15  --  3.73 4.01*   < > 5.22*  --     < >  "= values in this interval not displayed.          Review of Systems  Constitutional, HEENT, cardiovascular, pulmonary, GI, , musculoskeletal, neuro, skin, endocrine and psych systems are negative, except as otherwise noted.     Objective    Exam  BP (!) 142/82   Pulse 82   Temp 97.6  F (36.4  C) (Tympanic)   Resp 16   Ht 5' 7.5\" (1.715 m)   Wt 202 lb 6.4 oz (91.8 kg)   LMP 11/25/2024 (Approximate)   SpO2 99%   BMI 31.23 kg/m     Estimated body mass index is 31.23 kg/m  as calculated from the following:    Height as of this encounter: 5' 7.5\" (1.715 m).    Weight as of this encounter: 202 lb 6.4 oz (91.8 kg).    Physical Exam  GENERAL: alert and no distress  EYES: Eyes grossly normal to inspection, PERRL and conjunctivae and sclerae normal  HENT: ear canals and TM's normal, nose and mouth without ulcers or lesions  NECK: no adenopathy, no asymmetry, masses, or scars  RESP: lungs clear to auscultation - no rales, rhonchi or wheezes  CV: regular rate and rhythm, normal S1 S2, no S3 or S4, no murmur, click or rub, no peripheral edema  ABDOMEN: soft, nontender, no hepatosplenomegaly, no masses and bowel sounds normal   (female): normal female external genitalia, normal urethral meatus , normal vaginal mucosa, adnexae, and uterus without masses., and cervical polyp large noted at 3 o'clock adjacent to cervical os - measuring approx 3-4 mm   MS: no gross musculoskeletal defects noted, no edema  SKIN: no suspicious lesions or rashes  NEURO: Normal strength and tone, mentation intact and speech normal  PSYCH: mentation appears normal, affect normal/bright        Signed Electronically by: Nano Quinonez DNP    "

## 2025-01-02 NOTE — PATIENT INSTRUCTIONS
Patient Education     Increase daily physical activity/exercise 30 minutes most days of the week. See DASH diet booklet for healthy eating tips. Check your blood pressure twice a week (either at home, a pharmacy, or make nurse-only visits to have it done here) for the next month. Follow up in 2 months for a re-check, we may need to start medication at that time.     Goal would be to have your blood pressure < 140/90, call if repeated readings are higher than this as we may need to initiate blood pressure medication at that time.    Preventive Care Advice   This is general advice given by our system to help you stay healthy. However, your care team may have specific advice just for you. Please talk to your care team about your preventive care needs.  Nutrition  Eat 5 or more servings of fruits and vegetables each day.  Try wheat bread, brown rice and whole grain pasta (instead of white bread, rice, and pasta).  Get enough calcium and vitamin D. Check the label on foods and aim for 100% of the RDA (recommended daily allowance).  Lifestyle  Exercise at least 150 minutes each week  (30 minutes a day, 5 days a week).  Do muscle strengthening activities 2 days a week. These help control your weight and prevent disease.  No smoking.  Wear sunscreen to prevent skin cancer.  Have a dental exam and cleaning every 6 months.  Yearly exams  See your health care team every year to talk about:  Any changes in your health.  Any medicines your care team has prescribed.  Preventive care, family planning, and ways to prevent chronic diseases.  Shots (vaccines)   HPV shots (up to age 26), if you've never had them before.  Hepatitis B shots (up to age 59), if you've never had them before.  COVID-19 shot: Get this shot when it's due.  Flu shot: Get a flu shot every year.  Tetanus shot: Get a tetanus shot every 10 years.  Pneumococcal, hepatitis A, and RSV shots: Ask your care team if you need these based on your risk.  Shingles shot (for  age 50 and up)  General health tests  Diabetes screening:  Starting at age 35, Get screened for diabetes at least every 3 years.  If you are younger than age 35, ask your care team if you should be screened for diabetes.  Cholesterol test: At age 39, start having a cholesterol test every 5 years, or more often if advised.  Bone density scan (DEXA): At age 50, ask your care team if you should have this scan for osteoporosis (brittle bones).  Hepatitis C: Get tested at least once in your life.  STIs (sexually transmitted infections)  Before age 24: Ask your care team if you should be screened for STIs.  After age 24: Get screened for STIs if you're at risk. You are at risk for STIs (including HIV) if:  You are sexually active with more than one person.  You don't use condoms every time.  You or a partner was diagnosed with a sexually transmitted infection.  If you are at risk for HIV, ask about PrEP medicine to prevent HIV.  Get tested for HIV at least once in your life, whether you are at risk for HIV or not.  Cancer screening tests  Cervical cancer screening: If you have a cervix, begin getting regular cervical cancer screening tests starting at age 21.  Breast cancer scan (mammogram): If you've ever had breasts, begin having regular mammograms starting at age 40. This is a scan to check for breast cancer.  Colon cancer screening: It is important to start screening for colon cancer at age 45.  Have a colonoscopy test every 10 years (or more often if you're at risk) Or, ask your provider about stool tests like a FIT test every year or Cologuard test every 3 years.  To learn more about your testing options, visit:   .  For help making a decision, visit:   https://bit.ly/ym62733.  Prostate cancer screening test: If you have a prostate, ask your care team if a prostate cancer screening test (PSA) at age 55 is right for you.  Lung cancer screening: If you are a current or former smoker ages 50 to 80, ask your care team  if ongoing lung cancer screenings are right for you.  For informational purposes only. Not to replace the advice of your health care provider. Copyright   2023 [facility]. All rights reserved. Clinically reviewed by the [facility] Transitions Program. Proxible [id number] - REV 01/24.

## 2025-01-03 ENCOUNTER — TELEPHONE (OUTPATIENT)
Dept: FAMILY MEDICINE | Facility: CLINIC | Age: 55
End: 2025-01-03
Payer: COMMERCIAL

## 2025-01-06 ENCOUNTER — MYC MEDICAL ADVICE (OUTPATIENT)
Dept: FAMILY MEDICINE | Facility: CLINIC | Age: 55
End: 2025-01-06
Payer: COMMERCIAL

## 2025-01-06 DIAGNOSIS — F98.8 ATTENTION DEFICIT DISORDER OF ADULT: Primary | ICD-10-CM

## 2025-01-06 NOTE — TELEPHONE ENCOUNTER
PRIOR AUTHORIZATION DENIED    Medication: LISDEXAMFETAMINE DIMESYLATE 30 MG PO CAPS  Insurance Company: [employer]- Phone [phone] Fax [phone]  Denial Date: 1/6/2025  Denial Rational:           Appeal Information:   If provider would like to appeal please review the plan's reasons for denial listed above. Please utilize that information to complete letter and provide specific, detailed clinical information/rationale of your patient's health status to address their denial reasons.        Patient Notified: No

## 2025-01-06 NOTE — TELEPHONE ENCOUNTER
Retail Pharmacy Prior Authorization Team   Phone: [phone]    PA Initiation    Medication: LISDEXAMFETAMINE DIMESYLATE 30 MG PO CAPS  Insurance Company: [employer]- Phone [phone] Fax [phone]  Pharmacy Filling the Rx: [facility] [place], [place] - [address]  Filling Pharmacy Phone: [phone]  Filling Pharmacy Fax:    Start Date: 1/6/2025

## 2025-01-06 NOTE — CONFIDENTIAL NOTE
Notify patient of denial of Vyvanse - stimulant alternative.  As discussed in clinic can do slight increase in the Adderall to 30 mg    Let me know if you agree to this and will place 3 months and have your follow up as virtual visit in 3 months.  Nano Quinonez, DNP

## 2025-01-07 LAB
BKR AP ASSOCIATED HPV REPORT: NORMAL
BKR LAB AP GYN ADEQUACY: NORMAL
BKR LAB AP GYN INTERPRETATION: NORMAL
BKR LAB AP PREVIOUS ABNORMAL: NORMAL
PATH REPORT.COMMENTS IMP SPEC: NORMAL
PATH REPORT.COMMENTS IMP SPEC: NORMAL
PATH REPORT.RELEVANT HX SPEC: NORMAL

## 2025-01-07 RX ORDER — DEXTROAMPHETAMINE SACCHARATE, AMPHETAMINE ASPARTATE MONOHYDRATE, DEXTROAMPHETAMINE SULFATE AND AMPHETAMINE SULFATE 7.5; 7.5; 7.5; 7.5 MG/1; MG/1; MG/1; MG/1
30 CAPSULE, EXTENDED RELEASE ORAL DAILY
Qty: 30 CAPSULE | Refills: 0 | Status: SHIPPED | OUTPATIENT
Start: 2025-03-08 | End: 2025-04-07

## 2025-01-07 RX ORDER — DEXTROAMPHETAMINE SACCHARATE, AMPHETAMINE ASPARTATE MONOHYDRATE, DEXTROAMPHETAMINE SULFATE AND AMPHETAMINE SULFATE 7.5; 7.5; 7.5; 7.5 MG/1; MG/1; MG/1; MG/1
30 CAPSULE, EXTENDED RELEASE ORAL DAILY
Qty: 30 CAPSULE | Refills: 0 | Status: SHIPPED | OUTPATIENT
Start: 2025-02-06 | End: 2025-03-08

## 2025-01-07 RX ORDER — DEXTROAMPHETAMINE SACCHARATE, AMPHETAMINE ASPARTATE MONOHYDRATE, DEXTROAMPHETAMINE SULFATE AND AMPHETAMINE SULFATE 7.5; 7.5; 7.5; 7.5 MG/1; MG/1; MG/1; MG/1
30 CAPSULE, EXTENDED RELEASE ORAL DAILY
Qty: 30 CAPSULE | Refills: 0 | Status: SHIPPED | OUTPATIENT
Start: 2025-01-07 | End: 2025-02-06

## 2025-01-07 NOTE — CONFIDENTIAL NOTE
3 months sent to pharmacy.  Follow-up in 3 months for medication recheck can do virtual  Nano Quinonez DNP

## 2025-01-07 NOTE — TELEPHONE ENCOUNTER
See MyChart messages, pt requesting to increase Adderall. Routing to PCP for review. Current pharmacy is [facility].    Nia Mcintosh RN  [facility]

## 2025-02-03 ENCOUNTER — OFFICE VISIT (OUTPATIENT)
Dept: OBGYN | Facility: CLINIC | Age: 55
End: 2025-02-03
Attending: NURSE PRACTITIONER
Payer: COMMERCIAL

## 2025-02-03 VITALS
HEART RATE: 90 BPM | DIASTOLIC BLOOD PRESSURE: 90 MMHG | BODY MASS INDEX: 31.02 KG/M2 | SYSTOLIC BLOOD PRESSURE: 159 MMHG | TEMPERATURE: 97.9 F | WEIGHT: 201 LBS

## 2025-02-03 DIAGNOSIS — N84.1 POLYP AT CERVICAL OS: ICD-10-CM

## 2025-02-03 PROCEDURE — 99203 OFFICE O/P NEW LOW 30 MIN: CPT | Performed by: OBSTETRICS & GYNECOLOGY

## 2025-02-03 NOTE — NURSING NOTE
"Initial BP (!) 159/90 (BP Location: Right arm, Patient Position: Chair, Cuff Size: Adult Large)   Pulse 90   Temp 97.9  F (36.6  C) (Tympanic)   Wt 91.2 kg (201 lb)   LMP 11/25/2024 (Approximate)   BMI 31.02 kg/m   Estimated body mass index is 31.02 kg/m  as calculated from the following:    Height as of 1/2/25: 1.715 m (5' 7.5\").    Weight as of this encounter: 91.2 kg (201 lb). .      Patricia Jones MA    "

## 2025-02-03 NOTE — PROGRESS NOTES
Gynecology Consult Note       HPI: Consuelo Medellin is a 54 year old who presents for evaluation of cervical polyp.  The patient states she has been asymptomatic.  Was noticed incidentally at time of Pap smear.  She has not had any bleeding or spotting.  No pain.  No pain with intercourse.  No urinary or bowel symptoms and otherwise feeling well.    ROS: 10 pt ROS neg other than HPI    PMH:   Past Medical History:   Diagnosis Date    Gestational diabetes     Miscarriage 2010    Other ear problems     YOUNG CHILD    Thyroid disease     thyroid nodule       PSHx:   Past Surgical History:   Procedure Laterality Date    ABDOMEN SURGERY      Hernia/    BIOPSY      Thyroid    GYN SURGERY      C section    HERNIORRHAPHY INGUINAL  10/26/2011    Procedure:HERNIORRHAPHY INGUINAL; Right Inguinal Herniorrhapy with Mesh Placement; Surgeon:ABBIE SHIELDS; Location:[facility] OR    HERNIORRHAPHY VENTRAL N/A 2017    Procedure: HERNIORRHAPHY VENTRAL;  Surgeon: Stevie Bangura MD;  Location: [facility] OR    PE TUBES      PHACOEMULSIFICATION WITH STANDARD INTRAOCULAR LENS IMPLANT Left 2018    Procedure: PHACOEMULSIFICATION WITH STANDARD INTRAOCULAR LENS IMPLANT;  Left cataract removal with implant;  Surgeon: Sukh Mackey MD;  Location: [facility] OR    PHACOEMULSIFICATION WITH STANDARD INTRAOCULAR LENS IMPLANT Right 2018    Procedure: PHACOEMULSIFICATION WITH STANDARD INTRAOCULAR LENS IMPLANT;  Right Cataract Removal with Implant;  Surgeon: Sukh Mackey MD;  Location: [facility] OR    THYROIDECTOMY Left 2016    Procedure: THYROIDECTOMY;  Surgeon: Abbie Ornelas MD;  Location:  OR       Medications:   Current Outpatient Medications   Medication Sig Dispense Refill    amphetamine-dextroamphetamine (ADDERALL XR) 30 MG 24 hr capsule Take 1 capsule (30 mg) by mouth daily. 30 capsule 0    [START ON 2025] amphetamine-dextroamphetamine (ADDERALL XR) 30 MG 24 hr capsule Take  1 capsule (30 mg) by mouth daily. 30 capsule 0    [START ON 3/8/2025] amphetamine-dextroamphetamine (ADDERALL XR) 30 MG 24 hr capsule Take 1 capsule (30 mg) by mouth daily. 30 capsule 0     No current facility-administered medications for this visit.        Allergies:      Allergies   Allergen Reactions    Nkda [No Known Drug Allergy]     Wasps [Hornets] Nausea and Vomiting and Hives       Social History:   Social History     Socioeconomic History    Marital status:      Spouse name: Cresencio Medellin    Number of children: 1    Years of education: 20+    Highest education level: Not on file   Occupational History    Occupation: Drug and Alcohol Counselor     Employer: [employer]   Tobacco Use    Smoking status: Never     Passive exposure: Never    Smokeless tobacco: Never   Vaping Use    Vaping status: Never Used   Substance and Sexual Activity    Alcohol use: Not Currently    Drug use: No    Sexual activity: Yes     Partners: Male     Birth control/protection: Male Surgical     Comment: vasectomy   Other Topics Concern    Parent/sibling w/ CABG, MI or angioplasty before 65F 55M? No   Social History Narrative    Not on file     Social Drivers of Health     Financial Resource Strain: Low Risk  (1/1/2025)    Financial Resource Strain     Within the past 12 months, have you or your family members you live with been unable to get utilities (heat, electricity) when it was really needed?: No   Food Insecurity: Low Risk  (1/1/2025)    Food Insecurity     Within the past 12 months, did you worry that your food would run out before you got money to buy more?: No     Within the past 12 months, did the food you bought just not last and you didn t have money to get more?: No   Transportation Needs: Low Risk  (1/1/2025)    Transportation Needs     Within the past 12 months, has lack of transportation kept you from medical appointments, getting your medicines, non-medical meetings or appointments, work, or from  getting things that you need?: No   Physical Activity: Sufficiently Active (1/1/2025)    Exercise Vital Sign     Days of Exercise per Week: 5 days     Minutes of Exercise per Session: 30 min   Stress: No Stress Concern Present (1/1/2025)    [employer] [place] [employer] - Occupational Stress Questionnaire     Feeling of Stress : Not at all   Social Connections: Unknown (1/1/2025)    Social Connection and Isolation Panel [NHANES]     Frequency of Communication with Friends and Family: Not on file     Frequency of Social Gatherings with Friends and Family: Patient declined     Attends Anglican Services: Not on file     Active Member of Clubs or Organizations: Not on file     Attends Club or Organization Meetings: Not on file     Marital Status: Not on file   Interpersonal Safety: Low Risk  (1/2/2025)    Interpersonal Safety     Do you feel physically and emotionally safe where you currently live?: Yes     Within the past 12 months, have you been hit, slapped, kicked or otherwise physically hurt by someone?: No     Within the past 12 months, have you been humiliated or emotionally abused in other ways by your partner or ex-partner?: No   Housing Stability: Low Risk  (1/1/2025)    Housing Stability     Do you have housing? : Yes     Are you worried about losing your housing?: No       Family History:  Family History   Problem Relation Age of Onset    Heart Disease Mother         arrythmia    Atrial fibrillation Mother     Diabetes Father     C.A.D. Father     Coronary Artery Disease Father     C.A.D. Paternal Grandfather     Diabetes Paternal Grandfather        Physical Exam:   Vitals:    02/03/25 0945   BP: (!) 159/90   BP Location: Right arm   Patient Position: Chair   Cuff Size: Adult Large   Pulse: 90   Temp: 97.9  F (36.6  C)   TempSrc: Tympanic   Weight: 91.2 kg (201 lb)      Gen: lying in bed, NAD  CV: Reg rate, well perfused  Pulm: no increased work of breathing  Abd: non-tender, non-distended, no  masses   Pelvis: normal appearing external genitalia, vaginal mucosa, cervix, bimanual exam with normal size and contour of uterus with no adnexal masses, broad-based cervical polyp covering several cm of anterior cervix  Extremities: non-tender, no erythema; no edema  Psych: normal mood and affect  Neuro: no focal deficits         A&P: Consuelo Medellin is a 54 year old who is sent to clinic after Pap smear revealed the cervical polyp.  Discussed findings with the patient.  Initially had planned on removal in clinic today but given the broad base covering several centimeters of her anterior cervix did recommend excision in the operating room to allow for more complete removal and management of bleeding.  The patient states that she has been asymptomatic and wishes to decline.  Did discuss with patient recommendation for removal  for confirmation of diagnosis and to exclude any concerning pathology.  We discussed potential for polyp to grow over time and rare risk of something more concerning being found on removal.  Patient states understanding and wishes to continue to monitor.  Symptoms of concern reviewed.         Darya Martinez MD   2/3/2025 4:00 PM

## 2025-02-18 DIAGNOSIS — F98.8 ATTENTION DEFICIT DISORDER OF ADULT: Primary | ICD-10-CM

## 2025-02-18 RX ORDER — DEXTROAMPHETAMINE SACCHARATE, AMPHETAMINE ASPARTATE MONOHYDRATE, DEXTROAMPHETAMINE SULFATE AND AMPHETAMINE SULFATE 7.5; 7.5; 7.5; 7.5 MG/1; MG/1; MG/1; MG/1
30 CAPSULE, EXTENDED RELEASE ORAL DAILY
Qty: 30 CAPSULE | Refills: 0 | Status: SHIPPED | OUTPATIENT
Start: 2025-02-18 | End: 2025-03-20

## 2025-02-18 RX ORDER — DEXTROAMPHETAMINE SACCHARATE, AMPHETAMINE ASPARTATE MONOHYDRATE, DEXTROAMPHETAMINE SULFATE AND AMPHETAMINE SULFATE 7.5; 7.5; 7.5; 7.5 MG/1; MG/1; MG/1; MG/1
30 CAPSULE, EXTENDED RELEASE ORAL DAILY
Qty: 30 CAPSULE | Refills: 0 | Status: SHIPPED | OUTPATIENT
Start: 2025-03-20 | End: 2025-04-19

## 2025-02-18 RX ORDER — DEXTROAMPHETAMINE SACCHARATE, AMPHETAMINE ASPARTATE MONOHYDRATE, DEXTROAMPHETAMINE SULFATE AND AMPHETAMINE SULFATE 7.5; 7.5; 7.5; 7.5 MG/1; MG/1; MG/1; MG/1
30 CAPSULE, EXTENDED RELEASE ORAL DAILY
Qty: 30 CAPSULE | Refills: 0 | Status: SHIPPED | OUTPATIENT
Start: 2025-04-19 | End: 2025-05-19

## 2025-02-28 ENCOUNTER — MYC MEDICAL ADVICE (OUTPATIENT)
Dept: FAMILY MEDICINE | Facility: CLINIC | Age: 55
End: 2025-02-28
Payer: COMMERCIAL

## 2025-03-07 ENCOUNTER — HOSPITAL ENCOUNTER (OUTPATIENT)
Dept: MAMMOGRAPHY | Facility: CLINIC | Age: 55
Discharge: HOME | End: 2025-03-07
Attending: NURSE PRACTITIONER | Admitting: NURSE PRACTITIONER
Payer: COMMERCIAL

## 2025-03-07 DIAGNOSIS — Z12.31 VISIT FOR SCREENING MAMMOGRAM: ICD-10-CM

## 2025-03-07 PROCEDURE — 77067 SCR MAMMO BI INCL CAD: CPT

## 2025-03-07 PROCEDURE — 77063 BREAST TOMOSYNTHESIS BI: CPT

## 2025-04-02 ENCOUNTER — MYC REFILL (OUTPATIENT)
Dept: FAMILY MEDICINE | Facility: CLINIC | Age: 55
End: 2025-04-02
Payer: COMMERCIAL

## 2025-04-02 ENCOUNTER — MYC MEDICAL ADVICE (OUTPATIENT)
Dept: FAMILY MEDICINE | Facility: CLINIC | Age: 55
End: 2025-04-02
Payer: COMMERCIAL

## 2025-04-02 DIAGNOSIS — F98.8 ATTENTION DEFICIT DISORDER OF ADULT: ICD-10-CM

## 2025-04-02 RX ORDER — DEXTROAMPHETAMINE SACCHARATE, AMPHETAMINE ASPARTATE MONOHYDRATE, DEXTROAMPHETAMINE SULFATE AND AMPHETAMINE SULFATE 7.5; 7.5; 7.5; 7.5 MG/1; MG/1; MG/1; MG/1
30 CAPSULE, EXTENDED RELEASE ORAL DAILY
Qty: 30 CAPSULE | Refills: 0 | OUTPATIENT
Start: 2025-04-02

## 2025-06-05 DIAGNOSIS — F98.8 ATTENTION DEFICIT DISORDER OF ADULT: ICD-10-CM

## 2025-06-05 RX ORDER — DEXTROAMPHETAMINE SACCHARATE, AMPHETAMINE ASPARTATE MONOHYDRATE, DEXTROAMPHETAMINE SULFATE AND AMPHETAMINE SULFATE 7.5; 7.5; 7.5; 7.5 MG/1; MG/1; MG/1; MG/1
30 CAPSULE, EXTENDED RELEASE ORAL DAILY
Qty: 30 CAPSULE | Refills: 0 | Status: SHIPPED | OUTPATIENT
Start: 2025-06-05 | End: 2025-07-05

## 2025-07-08 ENCOUNTER — OFFICE VISIT (OUTPATIENT)
Dept: FAMILY MEDICINE | Facility: CLINIC | Age: 55
End: 2025-07-08
Payer: COMMERCIAL

## 2025-07-08 VITALS
RESPIRATION RATE: 16 BRPM | TEMPERATURE: 97.3 F | OXYGEN SATURATION: 98 % | DIASTOLIC BLOOD PRESSURE: 82 MMHG | WEIGHT: 203 LBS | HEIGHT: 67 IN | HEART RATE: 71 BPM | BODY MASS INDEX: 31.86 KG/M2 | SYSTOLIC BLOOD PRESSURE: 138 MMHG

## 2025-07-08 DIAGNOSIS — Z90.89 S/P THYROIDECTOMY: ICD-10-CM

## 2025-07-08 DIAGNOSIS — I10 ESSENTIAL HYPERTENSION: ICD-10-CM

## 2025-07-08 DIAGNOSIS — F98.8 ATTENTION DEFICIT DISORDER OF ADULT: Primary | ICD-10-CM

## 2025-07-08 DIAGNOSIS — Z98.890 S/P THYROIDECTOMY: ICD-10-CM

## 2025-07-08 DIAGNOSIS — Z13.6 CARDIOVASCULAR SCREENING; LDL GOAL LESS THAN 130: ICD-10-CM

## 2025-07-08 DIAGNOSIS — F33.0 MAJOR DEPRESSIVE DISORDER, RECURRENT EPISODE, MILD: ICD-10-CM

## 2025-07-08 PROCEDURE — 3079F DIAST BP 80-89 MM HG: CPT | Performed by: NURSE PRACTITIONER

## 2025-07-08 PROCEDURE — 99214 OFFICE O/P EST MOD 30 MIN: CPT | Performed by: NURSE PRACTITIONER

## 2025-07-08 PROCEDURE — 1126F AMNT PAIN NOTED NONE PRSNT: CPT | Performed by: NURSE PRACTITIONER

## 2025-07-08 PROCEDURE — G2211 COMPLEX E/M VISIT ADD ON: HCPCS | Performed by: NURSE PRACTITIONER

## 2025-07-08 PROCEDURE — 3075F SYST BP GE 130 - 139MM HG: CPT | Performed by: NURSE PRACTITIONER

## 2025-07-08 RX ORDER — DEXTROAMPHETAMINE SACCHARATE, AMPHETAMINE ASPARTATE MONOHYDRATE, DEXTROAMPHETAMINE SULFATE AND AMPHETAMINE SULFATE 7.5; 7.5; 7.5; 7.5 MG/1; MG/1; MG/1; MG/1
30 CAPSULE, EXTENDED RELEASE ORAL DAILY
Qty: 30 CAPSULE | Refills: 0 | Status: SHIPPED | OUTPATIENT
Start: 2025-09-06 | End: 2025-10-06

## 2025-07-08 RX ORDER — DEXTROAMPHETAMINE SACCHARATE, AMPHETAMINE ASPARTATE MONOHYDRATE, DEXTROAMPHETAMINE SULFATE AND AMPHETAMINE SULFATE 7.5; 7.5; 7.5; 7.5 MG/1; MG/1; MG/1; MG/1
30 CAPSULE, EXTENDED RELEASE ORAL DAILY
Qty: 30 CAPSULE | Refills: 0 | Status: SHIPPED | OUTPATIENT
Start: 2025-07-08 | End: 2025-08-07

## 2025-07-08 RX ORDER — DEXTROAMPHETAMINE SACCHARATE, AMPHETAMINE ASPARTATE MONOHYDRATE, DEXTROAMPHETAMINE SULFATE AND AMPHETAMINE SULFATE 7.5; 7.5; 7.5; 7.5 MG/1; MG/1; MG/1; MG/1
30 CAPSULE, EXTENDED RELEASE ORAL DAILY
Qty: 30 CAPSULE | Refills: 0 | Status: SHIPPED | OUTPATIENT
Start: 2025-08-07 | End: 2025-09-06

## 2025-07-08 RX ORDER — DEXTROAMPHETAMINE SACCHARATE, AMPHETAMINE ASPARTATE MONOHYDRATE, DEXTROAMPHETAMINE SULFATE AND AMPHETAMINE SULFATE 7.5; 7.5; 7.5; 7.5 MG/1; MG/1; MG/1; MG/1
30 CAPSULE, EXTENDED RELEASE ORAL DAILY
Qty: 30 CAPSULE | Refills: 0 | Status: CANCELLED | OUTPATIENT
Start: 2025-07-08

## 2025-07-08 ASSESSMENT — ANXIETY QUESTIONNAIRES
6. BECOMING EASILY ANNOYED OR IRRITABLE: NOT AT ALL
8. IF YOU CHECKED OFF ANY PROBLEMS, HOW DIFFICULT HAVE THESE MADE IT FOR YOU TO DO YOUR WORK, TAKE CARE OF THINGS AT HOME, OR GET ALONG WITH OTHER PEOPLE?: NOT DIFFICULT AT ALL
IF YOU CHECKED OFF ANY PROBLEMS ON THIS QUESTIONNAIRE, HOW DIFFICULT HAVE THESE PROBLEMS MADE IT FOR YOU TO DO YOUR WORK, TAKE CARE OF THINGS AT HOME, OR GET ALONG WITH OTHER PEOPLE: NOT DIFFICULT AT ALL
1. FEELING NERVOUS, ANXIOUS, OR ON EDGE: NOT AT ALL
2. NOT BEING ABLE TO STOP OR CONTROL WORRYING: NOT AT ALL
4. TROUBLE RELAXING: NOT AT ALL
7. FEELING AFRAID AS IF SOMETHING AWFUL MIGHT HAPPEN: NOT AT ALL
5. BEING SO RESTLESS THAT IT IS HARD TO SIT STILL: NOT AT ALL
GAD7 TOTAL SCORE: 0
3. WORRYING TOO MUCH ABOUT DIFFERENT THINGS: NOT AT ALL
GAD7 TOTAL SCORE: 0
GAD7 TOTAL SCORE: 0
7. FEELING AFRAID AS IF SOMETHING AWFUL MIGHT HAPPEN: NOT AT ALL

## 2025-07-08 ASSESSMENT — PAIN SCALES - GENERAL: PAINLEVEL_OUTOF10: NO PAIN (0)

## 2025-07-08 NOTE — PROGRESS NOTES
"  Assessment & Plan     Attention deficit disorder of adult  See AVS.  Refilled.  The risks, benefits and treatment options of prescribed medications or other treatments have been discussed with the patient. The patient verbalized their understanding and should call or follow up if no improvement or if they develop further problems.    - amphetamine-dextroamphetamine (ADDERALL XR) 30 MG 24 hr capsule  Dispense: 30 capsule; Refill: 0  - amphetamine-dextroamphetamine (ADDERALL XR) 30 MG 24 hr capsule  Dispense: 30 capsule; Refill: 0  - amphetamine-dextroamphetamine (ADDERALL XR) 30 MG 24 hr capsule  Dispense: 30 capsule; Refill: 0    Major depressive disorder, recurrent episode, mild  Stable.     Essential hypertension  Elevated blood pressure readings initially in clinic and occasionally at home.  Discussed lifestyle changes and monitoring.  Follow-up as recommended in 6 months for recheck, sooner if elevated blood pressure readings at home.  May need to start Ace-I or ARB at that time.    - Basic metabolic panel  (Ca, Cl, CO2, Creat, Gluc, K, Na, BUN)    CARDIOVASCULAR SCREENING; LDL GOAL LESS THAN 130     - Lipid panel reflex to direct LDL Fasting    S/P thyroidectomy  TSH   Date Value Ref Range Status   12/23/2024 2.15 0.30 - 4.20 uIU/mL Final   03/07/2019 3.73 0.40 - 4.00 mU/L Final       - TSH with free T4 reflex      BMI  Estimated body mass index is 31.79 kg/m  as calculated from the following:    Height as of this encounter: 1.702 m (5' 7\").    Weight as of this encounter: 92.1 kg (203 lb).   Weight management plan: Discussed healthy diet and exercise guidelines    Follow-up   No follow-ups on file.        Shadi Cordero is a 55 year old, presenting for the following health issues:  Attention Deficit Disorder ([facility])        7/8/2025     1:55 PM   Additional Questions   Roomed by Mathew HUGHES CMA   Accompanied by self     History of Present Illness       Reason for visit:  Follow up on medication    She eats " 4 or more servings of fruits and vegetables daily.She consumes 0 sweetened beverage(s) daily.She exercises with enough effort to increase her heart rate 20 to 29 minutes per day.  She exercises with enough effort to increase her heart rate 5 days per week.   She is taking medications regularly.          Medication Followup of Adderall  Taking Medication as prescribed: yes  Side Effects:  None  Medication Helping Symptoms:  yes  ADHD  Onset: 2-3 year(s) ago   Description:   Easily distracted: YES  Short attention span: YES  Trouble following directions: YES   Impulsive behavior: No   Trouble completing tasks: YES  Accompanying Signs & Symptoms:        Change in sleep pattern: none  Irritability at certain times of the day: No  Socially withdrawn: No  Depression symptoms: No  Anxiety symptoms: No  History:  Caffeine intake: Small  Loss of appetite: No  Healthy diet: YES  Did you have problems in school or with previous employment: No  Family history of ADHD: No  Have you had an evaluation for ADHD in the past: No  Do you use alcohol or drugs: No  Therapies tried: Adderall (concerta) with total relief    Hypertension Follow-up    Do you check your blood pressure regularly outside of the clinic? Yes   Are you following a low salt diet? Yes  Are your blood pressures ever more than 140 on the top number (systolic) OR more   than 90 on the bottom number (diastolic), for example 140/90? No    BP Readings from Last 2 Encounters:   07/08/25 138/82   02/03/25 (!) 159/90     How many servings of fruits and vegetables do you eat daily?  2-3  On average, how many sweetened beverages do you drink each day (Examples: soda, juice, sweet tea, etc.  Do NOT count diet or artificially sweetened beverages)?   0  How many days per week do you exercise enough to make your heart beat faster? 4  How many minutes a day do you exercise enough to make your heart beat faster? 20 - 29  How many days per week do you miss taking your medication? 0  "      Review of Systems  Constitutional, HEENT, cardiovascular, pulmonary, GI, , musculoskeletal, neuro, skin, endocrine and psych systems are negative, except as otherwise noted.      Objective    BP (!) 142/92 (BP Location: Right arm, Patient Position: Sitting, Cuff Size: Adult Regular)   Pulse 71   Temp 97.3  F (36.3  C) (Tympanic)   Resp 16   Ht 1.702 m (5' 7\")   Wt 92.1 kg (203 lb)   LMP 05/01/2025 (Approximate)   SpO2 98%   BMI 31.79 kg/m    Body mass index is 31.79 kg/m .  Physical Exam   GENERAL: alert and no distress  RESP: lungs clear to auscultation - no rales, rhonchi or wheezes  CV: regular rate and rhythm, normal S1 S2, no S3 or S4, no murmur, click or rub, no peripheral edema   PSYCH: mentation appears normal, affect normal/bright             Signed Electronically by: Nano Quinonez DNP    "

## 2025-07-08 NOTE — PATIENT INSTRUCTIONS
Refilled Adderall for 3 months - request additional 3 month refills via [employer].    Follow-up either in person or virtual (video) in 6 months.    Continue blood pressures at home - bring in couple of weeks of readings.  If blood pressure consistently > 140/90.    Increase activity - decreasing extra salt/preserved foods and weight loss.    Nano Quinonez, DNP

## 2025-07-20 ENCOUNTER — E-VISIT (OUTPATIENT)
Dept: FAMILY MEDICINE | Facility: CLINIC | Age: 55
End: 2025-07-20
Payer: COMMERCIAL

## 2025-07-20 DIAGNOSIS — N92.6 IRREGULAR PERIODS: ICD-10-CM

## 2025-07-20 DIAGNOSIS — F98.8 ATTENTION DEFICIT DISORDER OF ADULT: Primary | ICD-10-CM

## 2025-07-20 DIAGNOSIS — F33.0 MAJOR DEPRESSIVE DISORDER, RECURRENT EPISODE, MILD: ICD-10-CM

## 2025-07-30 ENCOUNTER — LAB (OUTPATIENT)
Dept: LAB | Facility: CLINIC | Age: 55
End: 2025-07-30
Payer: COMMERCIAL

## 2025-07-30 DIAGNOSIS — Z11.59 NEED FOR HEPATITIS C SCREENING TEST: ICD-10-CM

## 2025-07-30 DIAGNOSIS — N92.6 IRREGULAR PERIODS: ICD-10-CM

## 2025-07-30 DIAGNOSIS — Z11.4 SCREENING FOR HIV (HUMAN IMMUNODEFICIENCY VIRUS): ICD-10-CM

## 2025-07-30 LAB
ESTRADIOL SERPL-MCNC: 38 PG/ML
FSH SERPL IRP2-ACNC: 43.1 MIU/ML
TSH SERPL DL<=0.005 MIU/L-ACNC: 3.82 UIU/ML (ref 0.3–4.2)

## 2025-07-30 PROCEDURE — 83001 ASSAY OF GONADOTROPIN (FSH): CPT

## 2025-07-30 PROCEDURE — 82670 ASSAY OF TOTAL ESTRADIOL: CPT

## 2025-07-30 PROCEDURE — 84443 ASSAY THYROID STIM HORMONE: CPT

## 2025-07-30 PROCEDURE — 36415 COLL VENOUS BLD VENIPUNCTURE: CPT

## (undated) DEVICE — SOL NACL 0.9% IRRIG 1000ML BOTTLE 07138-09

## (undated) DEVICE — SOL WATER IRRIG 1000ML BOTTLE 07139-09

## (undated) DEVICE — PREP PAD ALCOHOL 6818

## (undated) RX ORDER — PHENYLEPHRINE HYDROCHLORIDE 25 MG/ML
SOLUTION/ DROPS OPHTHALMIC
Status: DISPENSED
Start: 2018-05-07

## (undated) RX ORDER — LIDOCAINE HYDROCHLORIDE 10 MG/ML
INJECTION, SOLUTION EPIDURAL; INFILTRATION; INTRACAUDAL; PERINEURAL
Status: DISPENSED
Start: 2018-05-07

## (undated) RX ORDER — CYCLOPENTOLATE HYDROCHLORIDE 10 MG/ML
SOLUTION/ DROPS OPHTHALMIC
Status: DISPENSED
Start: 2018-05-07

## (undated) RX ORDER — CYCLOPENTOLATE HYDROCHLORIDE 10 MG/ML
SOLUTION/ DROPS OPHTHALMIC
Status: DISPENSED
Start: 2018-07-09

## (undated) RX ORDER — TROPICAMIDE 10 MG/ML
SOLUTION/ DROPS OPHTHALMIC
Status: DISPENSED
Start: 2018-05-07

## (undated) RX ORDER — PHENYLEPHRINE HYDROCHLORIDE 25 MG/ML
SOLUTION/ DROPS OPHTHALMIC
Status: DISPENSED
Start: 2018-07-09

## (undated) RX ORDER — TROPICAMIDE 10 MG/ML
SOLUTION/ DROPS OPHTHALMIC
Status: DISPENSED
Start: 2018-07-09